# Patient Record
Sex: MALE | Race: WHITE | Employment: OTHER | URBAN - METROPOLITAN AREA
[De-identification: names, ages, dates, MRNs, and addresses within clinical notes are randomized per-mention and may not be internally consistent; named-entity substitution may affect disease eponyms.]

---

## 2020-11-10 ENCOUNTER — TELEPHONE (OUTPATIENT)
Dept: ORTHOPEDIC SURGERY | Age: 55
End: 2020-11-10

## 2022-05-27 ENCOUNTER — OFFICE VISIT (OUTPATIENT)
Dept: ENT CLINIC | Age: 57
End: 2022-05-27
Payer: COMMERCIAL

## 2022-05-27 VITALS — TEMPERATURE: 97.3 F | WEIGHT: 180 LBS | RESPIRATION RATE: 16 BRPM | BODY MASS INDEX: 25.2 KG/M2 | HEIGHT: 71 IN

## 2022-05-27 DIAGNOSIS — J39.2 OROPHARYNGEAL MASS: Primary | ICD-10-CM

## 2022-05-27 DIAGNOSIS — R25.2 TRISMUS: ICD-10-CM

## 2022-05-27 DIAGNOSIS — R59.0 CERVICAL LYMPHADENOPATHY: ICD-10-CM

## 2022-05-27 PROCEDURE — 31575 DIAGNOSTIC LARYNGOSCOPY: CPT | Performed by: OTOLARYNGOLOGY

## 2022-05-27 PROCEDURE — 99203 OFFICE O/P NEW LOW 30 MIN: CPT | Performed by: OTOLARYNGOLOGY

## 2022-05-27 PROCEDURE — 42800 BIOPSY OF THROAT: CPT | Performed by: OTOLARYNGOLOGY

## 2022-05-27 ASSESSMENT — ENCOUNTER SYMPTOMS
COUGH: 0
TROUBLE SWALLOWING: 1
VOICE CHANGE: 0
FACIAL SWELLING: 1
EYE ITCHING: 0
SINUS PRESSURE: 0
SHORTNESS OF BREATH: 0
SORE THROAT: 1
APNEA: 0

## 2022-05-27 NOTE — PROGRESS NOTES
Roxanne Sanchez 42, [address], [facility] Joseph Irwin  P: [phone]       Patient     Leno White  1965    ChiefComplaint     Chief Complaint   Patient presents with    Other     Patient stated that he was attempting to removed a tonsil stone with a dental pick, gagged and gashed the inside of his throat back in November. States that it bled a lot. States that he got an infection and got ABX. States that the pain has been off and on, he has gotten ABX a few other times. Now has pain in the jaw, ear, neck, states that he cannot open his mouth very far and he cannot eat because it is too painful to chew    Sinus Problem     States that he cannot breathe out through his nose- States that he has to breathe through his mouth which is making everything worse       History of Present Illness     Yamile Mendenhall is a 57-year-old male here today for evaluation of right-sided sore throat and trismus. Reports November 2021 attempted to get a tonsil stone out he dislodged a portion of his tonsil. Subsequently developed pain seen at urgent care diagnosed with tonsillitis and treated with antibiotics x2. Symptoms recurred in March and have persisted and worsened since. Reports trismus for the last 4 weeks. Believes he has an abscess that needs drained to improve symptoms. Current daily smoker. No history of daily alcohol use. Has lost 20 pounds in the last month secondary to difficulty eating. Past Medical History     History reviewed. No pertinent past medical history. Past Surgical History     History reviewed. No pertinent surgical history. Family History     History reviewed. No pertinent family history.     Social History     Social History     Tobacco Use    Smoking status: Current Every Day Smoker     Packs/day: 0.50     Types: Cigarettes    Smokeless tobacco: Never Used   Vaping Use    Vaping Use: Never used   Substance Use Topics    Alcohol use: Yes     Comment: 2 drinks a week    Drug use: Never        Allergies     Allergies   Allergen Reactions    Erythromycin Base      uncoated    Tetanus Toxoid      last injection was at age 13, got very high fever       Medications     No current outpatient medications on file. No current facility-administered medications for this visit. Review of Systems     Review of Systems   Constitutional: Negative for appetite change, chills, fatigue, fever and unexpected weight change. HENT: Positive for facial swelling, sore throat and trouble swallowing. Negative for congestion, ear discharge, ear pain, hearing loss, nosebleeds, postnasal drip, sinus pressure, sneezing, tinnitus and voice change. Eyes: Negative for itching. Respiratory: Negative for apnea, cough and shortness of breath. Endocrine: Negative for cold intolerance and heat intolerance. Musculoskeletal: Negative for myalgias and neck pain. Skin: Negative for rash. Allergic/Immunologic: Negative for environmental allergies. Neurological: Negative for dizziness and headaches. Hematological: Positive for adenopathy. Psychiatric/Behavioral: Negative for confusion, decreased concentration and sleep disturbance. PhysicalExam     Vitals:    05/27/22 1358   Resp: 16   Temp: 97.3 °F (36.3 °C)   TempSrc: Infrared   Weight: 180 lb (81.6 kg)   Height: 5' 11\" (1.803 m)       Physical Exam  Constitutional:       General: He is not in acute distress. Appearance: He is well-developed. HENT:      Head: Normocephalic and atraumatic. Comments: 1.5cm trismus     Right Ear: Tympanic membrane, ear canal and external ear normal. No drainage. No middle ear effusion. Tympanic membrane is not bulging. Tympanic membrane has normal mobility. Left Ear: Tympanic membrane, ear canal and external ear normal. No drainage. No middle ear effusion. Tympanic membrane is not bulging. Tympanic membrane has normal mobility. Nose: No mucosal edema or rhinorrhea. no complications. Pertinent findings:  Mass vs edematous tissue nasopharynx, right tonsillar mass- lateral pharyngeal wall does not appear to be involved    Biopsy oropharynx    Pre OP Dx: Oropharyngeal mass  Post OP Dx: Same  Consent: Written  Anesthetics: 1 cc of 1 % lido with epi  Procedure: the oropharynx was sprayed with Cetacaine and the lesion was with lidocaine with epinephrine and given adequate time to take effect. Then a cup forcep was used to take biopsy of lesion. Bleeding minimal.  Patient tolerated. Assessment and Plan     1. Oropharyngeal mass  - CT SOFT TISSUE NECK W CONTRAST; Future  - SURGICAL PATHOLOGY    2. Trismus    3. 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 Biopsy performed today in office. Plan for CT neck. Further recommendations after imaging and biopsy result. Peter Lee DO  5/27/22      Portions of this note were dictated using Dragon.  There may be linguistic errors secondary to the use of this program.

## 2022-05-30 ENCOUNTER — APPOINTMENT (OUTPATIENT)
Dept: GENERAL RADIOLOGY | Age: 57
DRG: 309 | End: 2022-05-30
Payer: COMMERCIAL

## 2022-05-30 ENCOUNTER — HOSPITAL ENCOUNTER (INPATIENT)
Age: 57
LOS: 1 days | Discharge: HOME | DRG: 309 | End: 2022-06-01
Attending: EMERGENCY MEDICINE | Admitting: INTERNAL MEDICINE
Payer: COMMERCIAL

## 2022-05-30 DIAGNOSIS — R55 SYNCOPE AND COLLAPSE: ICD-10-CM

## 2022-05-30 DIAGNOSIS — R00.1 SYMPTOMATIC BRADYCARDIA: Primary | ICD-10-CM

## 2022-05-30 LAB
A/G RATIO: 2 (ref 1.1–2.2)
ALBUMIN SERPL-MCNC: 4.2 G/DL (ref 3.4–5)
ALP BLD-CCNC: 71 U/L (ref 40–129)
ALT SERPL-CCNC: 15 U/L (ref 10–40)
ANION GAP SERPL CALCULATED.3IONS-SCNC: 16 MMOL/L (ref 3–16)
AST SERPL-CCNC: 12 U/L (ref 15–37)
BASOPHILS ABSOLUTE: 0.1 K/UL (ref 0–0.2)
BASOPHILS RELATIVE PERCENT: 1.3 %
BILIRUB SERPL-MCNC: 0.3 MG/DL (ref 0–1)
BUN BLDV-MCNC: 16 MG/DL (ref 7–20)
CALCIUM SERPL-MCNC: 8.7 MG/DL (ref 8.3–10.6)
CHLORIDE BLD-SCNC: 97 MMOL/L (ref 99–110)
CO2: 23 MMOL/L (ref 21–32)
CREAT SERPL-MCNC: 1 MG/DL (ref 0.9–1.3)
EOSINOPHILS ABSOLUTE: 0.1 K/UL (ref 0–0.6)
EOSINOPHILS RELATIVE PERCENT: 1.6 %
GFR AFRICAN AMERICAN: >60
GFR NON-AFRICAN AMERICAN: >60
GLUCOSE BLD-MCNC: 99 MG/DL (ref 70–99)
HCT VFR BLD CALC: 44.2 % (ref 40.5–52.5)
HEMOGLOBIN: 15.2 G/DL (ref 13.5–17.5)
LYMPHOCYTES ABSOLUTE: 2.4 K/UL (ref 1–5.1)
LYMPHOCYTES RELATIVE PERCENT: 31.2 %
MAGNESIUM: 2.1 MG/DL (ref 1.8–2.4)
MCH RBC QN AUTO: 30.9 PG (ref 26–34)
MCHC RBC AUTO-ENTMCNC: 34.3 G/DL (ref 31–36)
MCV RBC AUTO: 89.9 FL (ref 80–100)
MONOCYTES ABSOLUTE: 0.8 K/UL (ref 0–1.3)
MONOCYTES RELATIVE PERCENT: 10.4 %
NEUTROPHILS ABSOLUTE: 4.3 K/UL (ref 1.7–7.7)
NEUTROPHILS RELATIVE PERCENT: 55.5 %
PDW BLD-RTO: 13.3 % (ref 12.4–15.4)
PLATELET # BLD: 329 K/UL (ref 135–450)
PMV BLD AUTO: 6.7 FL (ref 5–10.5)
POTASSIUM SERPL-SCNC: 4 MMOL/L (ref 3.5–5.1)
PRO-BNP: 116 PG/ML (ref 0–124)
RBC # BLD: 4.91 M/UL (ref 4.2–5.9)
SODIUM BLD-SCNC: 136 MMOL/L (ref 136–145)
TOTAL PROTEIN: 6.3 G/DL (ref 6.4–8.2)
TROPONIN: <0.01 NG/ML
WBC # BLD: 7.8 K/UL (ref 4–11)

## 2022-05-30 PROCEDURE — 71045 X-RAY EXAM CHEST 1 VIEW: CPT

## 2022-05-30 PROCEDURE — 83880 ASSAY OF NATRIURETIC PEPTIDE: CPT

## 2022-05-30 PROCEDURE — 80179 DRUG ASSAY SALICYLATE: CPT

## 2022-05-30 PROCEDURE — 85025 COMPLETE CBC W/AUTO DIFF WBC: CPT

## 2022-05-30 PROCEDURE — 6360000002 HC RX W HCPCS: Performed by: EMERGENCY MEDICINE

## 2022-05-30 PROCEDURE — 80143 DRUG ASSAY ACETAMINOPHEN: CPT

## 2022-05-30 PROCEDURE — 96361 HYDRATE IV INFUSION ADD-ON: CPT

## 2022-05-30 PROCEDURE — 93005 ELECTROCARDIOGRAM TRACING: CPT | Performed by: EMERGENCY MEDICINE

## 2022-05-30 PROCEDURE — 80053 COMPREHEN METABOLIC PANEL: CPT

## 2022-05-30 PROCEDURE — 84484 ASSAY OF TROPONIN QUANT: CPT

## 2022-05-30 PROCEDURE — 84100 ASSAY OF PHOSPHORUS: CPT

## 2022-05-30 PROCEDURE — 2580000003 HC RX 258: Performed by: EMERGENCY MEDICINE

## 2022-05-30 PROCEDURE — 82077 ASSAY SPEC XCP UR&BREATH IA: CPT

## 2022-05-30 PROCEDURE — 96374 THER/PROPH/DIAG INJ IV PUSH: CPT

## 2022-05-30 PROCEDURE — 6360000002 HC RX W HCPCS

## 2022-05-30 PROCEDURE — 83735 ASSAY OF MAGNESIUM: CPT

## 2022-05-30 RX ORDER — LIDOCAINE HYDROCHLORIDE 20 MG/ML
15 SOLUTION OROPHARYNGEAL ONCE
Status: COMPLETED | OUTPATIENT
Start: 2022-05-30 | End: 2022-05-31

## 2022-05-30 RX ORDER — ATROPINE SULFATE 0.1 MG/ML
0.5 INJECTION INTRAVENOUS ONCE
Status: COMPLETED | OUTPATIENT
Start: 2022-05-30 | End: 2022-05-30

## 2022-05-30 RX ORDER — ATROPINE SULFATE 0.1 MG/ML
INJECTION INTRAVENOUS
Status: DISPENSED
Start: 2022-05-30 | End: 2022-05-31

## 2022-05-30 RX ORDER — 0.9 % SODIUM CHLORIDE 0.9 %
500 INTRAVENOUS SOLUTION INTRAVENOUS ONCE
Status: COMPLETED | OUTPATIENT
Start: 2022-05-30 | End: 2022-05-31

## 2022-05-30 RX ORDER — ATROPINE SULFATE 0.1 MG/ML
1 INJECTION INTRAVENOUS ONCE
Status: DISCONTINUED | OUTPATIENT
Start: 2022-05-30 | End: 2022-06-01 | Stop reason: HOSPADM

## 2022-05-30 RX ORDER — ATROPINE SULFATE 0.1 MG/ML
INJECTION INTRAVENOUS
Status: COMPLETED
Start: 2022-05-30 | End: 2022-05-30

## 2022-05-30 RX ADMIN — ATROPINE SULFATE 0.5 MG: 0.1 INJECTION, SOLUTION INTRAVENOUS at 23:27

## 2022-05-30 RX ADMIN — SODIUM CHLORIDE 500 ML: 9 INJECTION, SOLUTION INTRAVENOUS at 23:28

## 2022-05-30 RX ADMIN — ATROPINE SULFATE 0.5 MG: 0.1 INJECTION INTRAVENOUS at 23:27

## 2022-05-30 ASSESSMENT — PAIN - FUNCTIONAL ASSESSMENT: PAIN_FUNCTIONAL_ASSESSMENT: NONE - DENIES PAIN

## 2022-05-31 ENCOUNTER — APPOINTMENT (OUTPATIENT)
Dept: CT IMAGING | Age: 57
DRG: 309 | End: 2022-05-31
Payer: COMMERCIAL

## 2022-05-31 PROBLEM — R00.1 SYMPTOMATIC BRADYCARDIA: Status: ACTIVE | Noted: 2022-05-31

## 2022-05-31 PROBLEM — R22.1 MASS OF RIGHT SIDE OF NECK: Status: ACTIVE | Noted: 2022-05-31

## 2022-05-31 LAB
ACETAMINOPHEN LEVEL: <5 UG/ML (ref 10–30)
AMPHETAMINE SCREEN, URINE: NORMAL
BARBITURATE SCREEN URINE: NORMAL
BENZODIAZEPINE SCREEN, URINE: NORMAL
BILIRUBIN URINE: NEGATIVE
BLOOD, URINE: NEGATIVE
CANNABINOID SCREEN URINE: NORMAL
CHOLESTEROL, TOTAL: 182 MG/DL (ref 0–199)
CLARITY: CLEAR
COCAINE METABOLITE SCREEN URINE: NORMAL
COLOR: YELLOW
ESTIMATED AVERAGE GLUCOSE: 116.9 MG/DL
ETHANOL: 38 MG/DL (ref 0–0.08)
GLUCOSE URINE: NEGATIVE MG/DL
HBA1C MFR BLD: 5.7 %
HDLC SERPL-MCNC: 30 MG/DL (ref 40–60)
KETONES, URINE: ABNORMAL MG/DL
LACTIC ACID: 1 MMOL/L (ref 0.4–2)
LDL CHOLESTEROL CALCULATED: 129 MG/DL
LEUKOCYTE ESTERASE, URINE: NEGATIVE
LV EF: 55 %
LVEF MODALITY: NORMAL
Lab: NORMAL
METHADONE SCREEN, URINE: NORMAL
MICROSCOPIC EXAMINATION: ABNORMAL
NITRITE, URINE: NEGATIVE
OPIATE SCREEN URINE: NORMAL
OXYCODONE URINE: NORMAL
PH UA: 6
PH UA: 6 (ref 5–8)
PHENCYCLIDINE SCREEN URINE: NORMAL
PHOSPHORUS: 4.3 MG/DL (ref 2.5–4.9)
PROPOXYPHENE SCREEN: NORMAL
PROTEIN UA: NEGATIVE MG/DL
SALICYLATE, SERUM: <0.3 MG/DL (ref 15–30)
SPECIFIC GRAVITY UA: 1.02 (ref 1–1.03)
TOTAL CK: 55 U/L (ref 39–308)
TRIGL SERPL-MCNC: 113 MG/DL (ref 0–150)
TROPONIN: <0.01 NG/ML
TROPONIN: <0.01 NG/ML
TSH REFLEX: 0.57 UIU/ML (ref 0.27–4.2)
URINE REFLEX TO CULTURE: ABNORMAL
URINE TYPE: ABNORMAL
UROBILINOGEN, URINE: 0.2 E.U./DL
VLDLC SERPL CALC-MCNC: 23 MG/DL

## 2022-05-31 PROCEDURE — 82550 ASSAY OF CK (CPK): CPT

## 2022-05-31 PROCEDURE — 6370000000 HC RX 637 (ALT 250 FOR IP): Performed by: INTERNAL MEDICINE

## 2022-05-31 PROCEDURE — 81003 URINALYSIS AUTO W/O SCOPE: CPT

## 2022-05-31 PROCEDURE — 83036 HEMOGLOBIN GLYCOSYLATED A1C: CPT

## 2022-05-31 PROCEDURE — 2000000000 HC ICU R&B

## 2022-05-31 PROCEDURE — 6360000002 HC RX W HCPCS: Performed by: INTERNAL MEDICINE

## 2022-05-31 PROCEDURE — 80061 LIPID PANEL: CPT

## 2022-05-31 PROCEDURE — 6360000004 HC RX CONTRAST MEDICATION: Performed by: OTOLARYNGOLOGY

## 2022-05-31 PROCEDURE — 83605 ASSAY OF LACTIC ACID: CPT

## 2022-05-31 PROCEDURE — 2580000003 HC RX 258: Performed by: INTERNAL MEDICINE

## 2022-05-31 PROCEDURE — 99285 EMERGENCY DEPT VISIT HI MDM: CPT

## 2022-05-31 PROCEDURE — 99223 1ST HOSP IP/OBS HIGH 75: CPT | Performed by: INTERNAL MEDICINE

## 2022-05-31 PROCEDURE — 36415 COLL VENOUS BLD VENIPUNCTURE: CPT

## 2022-05-31 PROCEDURE — 80307 DRUG TEST PRSMV CHEM ANLYZR: CPT

## 2022-05-31 PROCEDURE — 93306 TTE W/DOPPLER COMPLETE: CPT

## 2022-05-31 PROCEDURE — 84484 ASSAY OF TROPONIN QUANT: CPT

## 2022-05-31 PROCEDURE — 84443 ASSAY THYROID STIM HORMONE: CPT

## 2022-05-31 PROCEDURE — 6370000000 HC RX 637 (ALT 250 FOR IP): Performed by: EMERGENCY MEDICINE

## 2022-05-31 PROCEDURE — 70491 CT SOFT TISSUE NECK W/DYE: CPT

## 2022-05-31 RX ORDER — ACETAMINOPHEN 325 MG/1
650 TABLET ORAL EVERY 6 HOURS PRN
Status: DISCONTINUED | OUTPATIENT
Start: 2022-05-31 | End: 2022-06-01 | Stop reason: HOSPADM

## 2022-05-31 RX ORDER — NICOTINE 21 MG/24HR
1 PATCH, TRANSDERMAL 24 HOURS TRANSDERMAL DAILY
Status: DISCONTINUED | OUTPATIENT
Start: 2022-05-31 | End: 2022-06-01 | Stop reason: HOSPADM

## 2022-05-31 RX ORDER — HYDRALAZINE HYDROCHLORIDE 20 MG/ML
10 INJECTION INTRAMUSCULAR; INTRAVENOUS EVERY 4 HOURS PRN
Status: DISCONTINUED | OUTPATIENT
Start: 2022-05-31 | End: 2022-06-01 | Stop reason: HOSPADM

## 2022-05-31 RX ORDER — PROMETHAZINE HYDROCHLORIDE 25 MG/1
12.5 TABLET ORAL EVERY 6 HOURS PRN
Status: DISCONTINUED | OUTPATIENT
Start: 2022-05-31 | End: 2022-06-01 | Stop reason: HOSPADM

## 2022-05-31 RX ORDER — ONDANSETRON 2 MG/ML
4 INJECTION INTRAMUSCULAR; INTRAVENOUS EVERY 6 HOURS PRN
Status: DISCONTINUED | OUTPATIENT
Start: 2022-05-31 | End: 2022-06-01 | Stop reason: HOSPADM

## 2022-05-31 RX ORDER — SODIUM CHLORIDE 0.9 % (FLUSH) 0.9 %
10 SYRINGE (ML) INJECTION EVERY 12 HOURS SCHEDULED
Status: DISCONTINUED | OUTPATIENT
Start: 2022-05-31 | End: 2022-06-01 | Stop reason: HOSPADM

## 2022-05-31 RX ORDER — MAGNESIUM SULFATE IN WATER 40 MG/ML
2000 INJECTION, SOLUTION INTRAVENOUS PRN
Status: DISCONTINUED | OUTPATIENT
Start: 2022-05-31 | End: 2022-06-01 | Stop reason: HOSPADM

## 2022-05-31 RX ORDER — ENOXAPARIN SODIUM 100 MG/ML
40 INJECTION SUBCUTANEOUS DAILY
Status: DISCONTINUED | OUTPATIENT
Start: 2022-05-31 | End: 2022-06-01 | Stop reason: HOSPADM

## 2022-05-31 RX ORDER — SODIUM CHLORIDE 0.9 % (FLUSH) 0.9 %
10 SYRINGE (ML) INJECTION PRN
Status: DISCONTINUED | OUTPATIENT
Start: 2022-05-31 | End: 2022-06-01 | Stop reason: HOSPADM

## 2022-05-31 RX ORDER — POTASSIUM CHLORIDE 7.45 MG/ML
10 INJECTION INTRAVENOUS PRN
Status: DISCONTINUED | OUTPATIENT
Start: 2022-05-31 | End: 2022-06-01 | Stop reason: HOSPADM

## 2022-05-31 RX ORDER — SODIUM CHLORIDE 9 MG/ML
INJECTION, SOLUTION INTRAVENOUS PRN
Status: DISCONTINUED | OUTPATIENT
Start: 2022-05-31 | End: 2022-06-01 | Stop reason: HOSPADM

## 2022-05-31 RX ORDER — ACETAMINOPHEN 650 MG/1
650 SUPPOSITORY RECTAL EVERY 6 HOURS PRN
Status: DISCONTINUED | OUTPATIENT
Start: 2022-05-31 | End: 2022-06-01 | Stop reason: HOSPADM

## 2022-05-31 RX ADMIN — ENOXAPARIN SODIUM 40 MG: 100 INJECTION SUBCUTANEOUS at 12:09

## 2022-05-31 RX ADMIN — Medication 10 ML: at 12:10

## 2022-05-31 RX ADMIN — Medication 10 ML: at 20:41

## 2022-05-31 RX ADMIN — LIDOCAINE HYDROCHLORIDE 15 ML: 20 SOLUTION ORAL; TOPICAL at 00:00

## 2022-05-31 RX ADMIN — HYDRALAZINE HYDROCHLORIDE 10 MG: 20 INJECTION INTRAMUSCULAR; INTRAVENOUS at 12:07

## 2022-05-31 RX ADMIN — IOPAMIDOL 75 ML: 755 INJECTION, SOLUTION INTRAVENOUS at 14:05

## 2022-05-31 RX ADMIN — MUPIROCIN: 20 OINTMENT TOPICAL at 20:41

## 2022-05-31 RX ADMIN — MUPIROCIN: 20 OINTMENT TOPICAL at 12:07

## 2022-05-31 ASSESSMENT — ENCOUNTER SYMPTOMS
VOMITING: 1
BACK PAIN: 0
TROUBLE SWALLOWING: 0
ABDOMINAL PAIN: 0
FACIAL SWELLING: 1
SHORTNESS OF BREATH: 0
EYE PAIN: 0
CHEST TIGHTNESS: 0
NAUSEA: 1

## 2022-05-31 NOTE — CARE COORDINATION
CASE MANAGEMENT INITIAL ASSESSMENT      Reviewed chart and completed assessment with patient:  Family present: wife, sister and sister in law  Explained Case Management role/services. Primary contact information:wife, Helga Moore [phone]    Health Care Decision Maker :   Helga Moore [phone]        Can this person be reached and be able to respond quickly, such as within a few minutes or hours? Yes      Admit date/status:05/31/22  Diagnosis:Syncope and collapse   Is this a Readmission?:  No      Insurance: [facility] required for SNF: Yes       3 night stay required: No    Living arrangements, Adls, care needs, prior to admission:two story house with spouse and two sons. [employer]. Works full time. Durable Medical Equipment at home:  None    Services in the home and/or outpatient, prior to admission:none    Current Aaron Herrera, DO                                Medications:yes Prescription coverage? Yes Will pt require financial assistance with medications No     Transportation needs: drives     Dialysis Facility (if applicable)   · Name:  · Address:  · Dialysis Schedule:  · Phone:  · Fax:    PT/OT recs:n/a    Hospital Exemption Notification (HEN):n/a    Barriers to discharge:none    Plan/comments:denies needs. Will sign off. Please consult if needs arise.

## 2022-05-31 NOTE — PLAN OF CARE
Problem: Discharge Planning  Goal: Discharge to home or other facility with appropriate resources  Outcome: Progressing  Flowsheets (Taken 5/31/2022 0138)  Discharge to home or other facility with appropriate resources: Identify barriers to discharge with patient and caregiver     Problem: Safety - Adult  Goal: Free from fall injury  Outcome: Progressing

## 2022-05-31 NOTE — PROGRESS NOTES
Hospitalist Progress Note Addendum    The patient was admitted after midnight for   Principal Problem:    Symptomatic bradycardia  Resolved Problems:    * No resolved hospital problems. *  .    Brief update:  61yo WM with no sig PMHX presents from home with loss of consciousness. This has happened several times in the past month. He has c/o R jaw pain that has progressed to R numbness and tingling. He just had a biopsy of mass in R jaw. Apparently in 11/21 he had a tonsil stone and was trying to remove it himself with a dental pick. HE started to bleed and developed a lot of pain. He was dx twice with tonsillitis since then and was give abx x 2. He has lost 20 pounds in past month due to difficulty eating. The episodes of syncope often occur after eating and he feels dizzy and weird. Wife noted that he as shaking his R hand during the episode. He was profoundly hypotensive and bradycardic and they noted a second degree AV bloack. Atropine was given and he improved. Plan is for CT of neck to see if mass is pushing on vagus nerve. Pt has been having off and on odd rhythms.     BP (!) 142/83   Pulse 64   Temp 98.4 °F (36.9 °C) (Oral)   Resp 14   Ht 6' (1.829 m)   Wt 183 lb 6.8 oz (83.2 kg)   SpO2 96%   BMI 24.88 kg/m²       CBC with Differential:    Lab Results   Component Value Date    WBC 7.8 05/30/2022    RBC 4.91 05/30/2022    HGB 15.2 05/30/2022    HCT 44.2 05/30/2022     05/30/2022    MCV 89.9 05/30/2022    MCH 30.9 05/30/2022    MCHC 34.3 05/30/2022    RDW 13.3 05/30/2022    LYMPHOPCT 31.2 05/30/2022    MONOPCT 10.4 05/30/2022    BASOPCT 1.3 05/30/2022    MONOSABS 0.8 05/30/2022    LYMPHSABS 2.4 05/30/2022    EOSABS 0.1 05/30/2022    BASOSABS 0.1 05/30/2022     BMP:    Lab Results   Component Value Date     05/30/2022    K 4.0 05/30/2022    CL 97 05/30/2022    CO2 23 05/30/2022    BUN 16 05/30/2022    LABALBU 4.2 05/30/2022    CREATININE 1.0 05/30/2022 CALCIUM 8.7 05/30/2022    GFRAA >60 05/30/2022    LABGLOM >60 05/30/2022    GLUCOSE 99 05/30/2022       The patient has been seen and examined. Difficulty with talking due to pain in R jaw/mouth. Possibly mass is impacting vagus nerve. I have the following recommendations:    1. Symptomatic bradycardia = awaiting neck CT, then will feed pt - will need to see if mass needs to be removed if it is impinging on nerve and causing arrhythmias  2.  R neck mass - await path results  3. This patient meets with criteria for  moderate  malnutrution based on a BMI of  Body mass index is 24.88 kg/m². .  This malnutrition is likely due to mass in R neck .               Electronically signed by Karly Chang MD on 5/31/2022 at 8:56 AM

## 2022-05-31 NOTE — CONSULTS
[address] [address]                                  CONSULTATION    PATIENT NAME: Vilma Chung                    :        1965  MED REC NO:   [id number]                          ROOM:       6661  ACCOUNT NO:   [de-identified]                           ADMIT DATE: 2022  PROVIDER:     Janette Kaufman MD    CONSULT DATE:  2022    CARDIAC ELECTROPHYSIOLOGY CONSULTATION    REASON FOR CONSULTATION:  Syncope. HISTORY OF PRESENT ILLNESS:  The patient is a 47-year-old man with  recent history of right pharyngeal mass who presents after an episode of  syncope on 2022. The patient reports that his wife reports_ he had  eaten dinner and was sitting down on the bed and felt dizzy. He then  stood up and had loss of postural tone and fell backwards. Once supine  he recovered consciousness within a few seconds. He had a second  episode of dizziness, which did not result in loss of consciousness. The patient did not attempt to stand up with the second episode. The  patient did complain of right jaw pain with some paresthesias in the  right side of the mandible at that time. EMS was summoned to the scene. They reported some bradycardia. The patient is brought to the emergency  department. Unfortunately, no vital signs from the emergency department  are available. A verbal report indicates that the patient was  hypotensive and lethargic. He was administered atropine and had some  improvement. Reports from EMS demonstrates second-degree heart block,  which was also commented upon in the emergency department, though no  ECGs are available to demonstrate this. ECGs in the emergency  department do demonstrate some episodes of sinus bradycardia. On ECG  telemetry monitoring, the patient has had some episodes of second-degree  type 2 sinoatrial block with sinus rates in the 40s.   These have not  been symptomatic. The patient has no previous history of cardiovascular disease or cardiac  arrhythmias. He does have history of right pharyngeal mass, which may  represent infection. On examination a biopsy was performed by ENT on  05/27/2022. At this point, the patient continues have some trismus with  inability to open his mouth very wide. He does have some intermittent  throat and jaw pain on the right. PAST MEDICAL HISTORY:  Right pharyngeal trauma. MEDICATIONS:  At the time of admission include none. ALLERGIES:  Include ERYTHROMYCIN and TETANUS TOXOID. SOCIAL HISTORY:  The patient is a current everyday cigarette smoker. He  does consume about two alcoholic beverages per week. FAMILY HISTORY:  Unremarkable for cardiac rhythm disturbance, syncope or  sudden cardiac death. REVIEW OF SYSTEMS:  Demonstrates no recent change in appetite. He has  had a 10 to 20-pounds weight loss in the last 7 months, likely related  to his discomfort with swallowing. He has not had chest pain. He has  not had palpitations. He has not had near-syncope or syncope except  that, which precipitated admission. His functional status is otherwise  well preserved. All other components of 14-system review are negative. PHYSICAL EXAMINATION:  VITAL SIGNS:  Blood pressure is 150/90, heart rate is 72 beats per  minute and regular with some episodes of SA block. HEENT:  Exam demonstrates normocephalic and atraumatic head. There is  no scleral icterus. Pupils are round and reactive. There is no  substantial tenderness in the right throat. LUNGS:  Clear to auscultation. CARDIOVASCULAR:  Exam reveals a regular rhythm. The apical impulse is  discrete. S1 and S2 are normal.  There is no audible murmur or gallop. The jugular venous pressure does not appear elevated. ABDOMEN:  Lean, soft and nontender. EXTREMITIES:  Demonstrate no pitting, pretibial or dependent edema. There is no cyanosis.   There is no evidence for chronic venous stasis. SKIN:  Otherwise warm and dry without skin rash. DIAGNOSTIC DATA:  A 12-lead ECG from 05/30/2022 demonstrates sinus  bradycardia with heart rate of 50 beats per minute. IMPRESSIONS:  1. Syncope. 2.  Sinoatrial exit block. 3.  Right pharyngeal mass. The patient is admitted after an episode of syncope on 05/30/2022. The  patient had symptoms of lightheadedness and after standing lost postural  tone and probably had brief loss of consciousness. He subsequently had  another episode of dizziness, which did not result in syncope, likely  because he did not attempt to stand up. EMS in the emergency department  described episodic bradycardia with AV block, though this cannot be  confirmed by ECG. He does have some episodes of sinoatrial block on ECG  telemetry monitoring, but these have not resulted in substantial  bradycardia or symptoms. I am concerned about the right pharyngeal mass  and the right jaw discomfort was associated with the episodes. He may  have had significant pain to cause a hypotension-bradycardia syndrome  related to heightened vagal tone. He also may have some carotid sinus  hypersensitivity related to a pressure on the right carotid from this  mass. The records at the time of admission demonstrate,  concurrent bradycardia and hypotension, consistent with an autonomically  mediated hypotension - bradycardia syndrome. That notwithstanding, he  does have evidence for sinoatrial block by ECG telemetry monitoring,  which is unusual in this age range. It is not clear that this is new or  that this is related to his symptom. RECOMMENDATIONS:  1. Continue ECG telemetry monitoring. 2.  Would avoid isoproterenol or atropine. 3.  CT scan of the head and neck to evaluate the right pharyngeal mass. 4.  Would pursue permanent pacing if clearly symptomatic bradycardia is  identified. Thanks for the opportunity to assist in the care of the patient. Please  contact me, if you have any questions regarding his evaluation.         Ruddy Oconnell MD    D: 05/31/2022 10:58:33       T: 05/31/2022 11:01:08     [email or web address]  Job#: [id number]     Doc#: [id number]    CC:

## 2022-05-31 NOTE — PROGRESS NOTES
Dr. Latonya Gibbs at bedside. Reviewed CT scan results with patient. Will await biopsy results and then consult [facility]. Page to Dr. Boyd Maddox to Connor Spangler 4 order and further plan for patient.

## 2022-05-31 NOTE — H&P
Hospital Medicine History & Physical      PCP: Dino Baer DO    Date of Admission: 5/30/2022    Date of Service: Pt seen/examined on 05/31/2022    Pt seen/examined face to face on and admitted as inpatient with expected LOS greater than two midnights due to medical therapy    Chief Complaint:    Chief Complaint   Patient presents with    Loss of Consciousness     symptomatic bradycardia        History Of Present Illness:      60 y.o. male who presented to [facility] independent presented to the ED for acute encephalopathy. Patient reported that he does not exactly remember the episode however wife was at bedside reported that the patient ate dinner and then was in the room was sitting down in bed and then patient felt dizzy and was saying he felt weird stood up and then suddenly fell backwards was encephalopathic and came right back within seconds. Patient then was laid on the bed and had another episode where he would suddenly lose conscious and then regained conscious. Patient during her did complain of right jaw pain that continued to progress to right mouth numbness and tingling. Patient otherwise denied having any chest pain shortness of breath abdominal pain dysuria but does admit of having palpitations. There was no incontinence, foaming, tongue biting, that was noted. Wife did report that the patient was shaking on his right hand. EMS was called immediately and patient was obtained. In the ED patient was noted to be in second-degree AV block that was not caught on EKG. Patient responded with atropine and cardiology was consulted with recommendation to the ICU      Past Medical History:    Cigarette smoking  History reviewed. No pertinent past medical history. Past Surgical History:    Tracheal scope  History reviewed. No pertinent surgical history.     Medications Prior to Admission:      Prior to Admission medications    Not on File       Allergies:  Erythromycin base and Tetanus toxoid    Social History:          TOBACCO:   reports that he has been smoking cigarettes. He has been smoking about 0.50 packs per day. He has never used smokeless tobacco.  ETOH:   reports current alcohol use. E-Cigarettes/Vaping Use     Questions Responses    E-Cigarette/Vaping Use Never User    Start Date     Passive Exposure     Quit Date     Counseling Given     Comments             Family History:      Reviewed in detail, and negative paternally for sudden death      REVIEW OF SYSTEMS:     Constitutional:  No Fever, No Chills, No Night Sweats  ENT/Mouth:  No Nasal Congestion,  No Hoarseness, No new mouth lesion  Eyes:  No Eye Pain, No Redness, No Discharge  Cardiovascular:  No Chest Pain, No Orthopnea + palpitations  Respiratory:  No Cough, No Sputum, No Dyspnea  Gastrointestinal: No Vomiting, No Diarrhea, No abdominal pain  Genitourinary: No Urinary Frequency, No Hematuria, No Urinary pain  Musculoskeletal:  No worsening Arthralgias, No worsening Myalgias  Skin:  No new Skin Lesions, No new skin rash  Neuro:  No new weakness, No new numbness. Psych:  No suicial ideation, No Violence ideation    PHYSICAL EXAM PERFORMED:    /83   Pulse 64   Temp 97.8 °F (36.6 °C) (Oral)   Resp 15   Ht 6' (1.829 m)   Wt 180 lb (81.6 kg)   SpO2 95%   BMI 24.41 kg/m²     General appearance:  mild acute distress, appears older than stated age  HEENT:   atraumatic, sclera anicteric, Conjunctivae clear. Neck: Supple,Trachea midline, no goiter  Respiratory:minimal accessory muscle usage, Normal respiratory effort. Clear to auscultation, bilaterally without wheezing  Cardiovascular:  Regular rate and rhythm, capillary refill 2 seconds  Abdomen: Soft, non-tender, non-distended with normal bowel sounds. Musculoskeletal:  No clubbing, cyanosis. trace edema LE bilaterally. Skin: turgor normal.  No new rashes or lesions. Neurologic: Alert and oriented x4, no new focal sensory/motor deficits.      Labs: Recent Labs     05/30/22  2324   WBC 7.8   HGB 15.2   HCT 44.2        Recent Labs     05/30/22  2324      K 4.0   CL 97*   CO2 23   BUN 16   CREATININE 1.0   CALCIUM 8.7     Recent Labs     05/30/22  2324   AST 12*   ALT 15   BILITOT 0.3   ALKPHOS 71     No results for input(s): INR in the last 72 hours. Recent Labs     05/30/22  2324   TROPONINI <0.01       Urinalysis:    No results found for: Fabiola [place], BACTERIA, RBCUA, BLOODU, [address], GLUCOSEU    Radiology:     CXR: I have reviewed the CXR with the following interpretation:   No acute process  EKG:  I have reviewed the EKG with the following interpretation:   Sinus bradycardia QTc 386  XR CHEST PORTABLE   Final Result   No acute abnormality. ASSESSMENT AND PLAN:    Active Hospital Problems    Diagnosis Date Noted    Symptomatic bradycardia [R00.1] 05/31/2022     Priority: Medium     Symptomatic bradycardia:  Suspected secondary AV block noted on the monitor when patient was in stable. Responsive to atropine  Cardiology consulted, much appreciated  Recommendation of placing patient on isoproterenol  Telemetry with ICU admission    Cigarette smoking:  Nicotine patch ordered    oropharyngeal mass status post  Biopsy:  CT neck ordered as outpatient.   Patient deferred work-up in the inpatient  Outpatient follow-up with ENT    Metabolic acidosis: Anion gap 16  Ethanol, salicylate, lactic acid pending      Diet: NPO except meds ordered    DVT Prophylaxis: lovenox    Dispo:   Expected LOS greater than two [facility]

## 2022-05-31 NOTE — ED PROVIDER NOTES
[address] ENCOUNTER        Pt Name: La Nena Sanders  MRN: [id number]  [place] 1965  Date of evaluation: 5/30/2022  Provider: Nallely Anderson MD  PCP: River Edwards DO      CHIEF COMPLAINT       Chief Complaint   Patient presents with    Loss of Consciousness     symptomatic bradycardia       HISTORY OFPRESENT ILLNESS   (Location/Symptom, Timing/Onset, Context/Setting, Quality, Duration, Modifying Factors,Severity)  Note limiting factors. La Nena Sanders is a 60 y.o. male presenting today due to concern for developing severe lightheadedness with a syncopal event around 9:30 PM this evening at which point EMS arrived and stated his heart rate was in the low 30s. Before they gave him any atropine, his heart rate avis up appropriately although he did have a vomiting episode following this and therefore he received Zofran. He does report right-sided facial pain and is currently being worked up by ENT for possible mass. He denied ever having any chest pain or shortness of breath tonight. He did become very diaphoretic this evening. He denies any fever. He denies any headache or neck pain. Due to concern for syncopal event and then having an second episode while in the squad where he almost passed out again, he was brought to the ED for further evaluation. He has never felt this way before or dealt with syncope. He does have a history of smoking. No reported trauma from the syncope this evening. REVIEW OF SYSTEMS    (2-9 systems for level 4, 10 or more for level 5)     Review of Systems   Constitutional: Positive for activity change and diaphoresis. Negative for fever. HENT: Positive for ear pain (right) and facial swelling (right jaw). Negative for trouble swallowing. Eyes: Positive for visual disturbance (darkened vision briefly). Negative for pain. Respiratory: Negative for chest tightness and shortness of breath.     Cardiovascular: Negative Session: Not on file   Stress:     Feeling of Stress : Not on file   Social Connections:     Frequency of Communication with Friends and Family: Not on file    Frequency of Social Gatherings with Friends and Family: Not on file    Attends [employer] Services: Not on file    Active Member of [address] [place] [address] or Organizations: Not on file    Attends Club or Organization Meetings: Not on file    Marital Status: Not on file   Intimate Partner Violence:     Fear of Current or Ex-Partner: Not on file    Emotionally Abused: Not on file    Physically Abused: Not on file    Sexually Abused: Not on file   Housing Stability:     Unable to Pay for Housing in the Last Year: Not on file    Number of [place] in the Last Year: Not on file    Unstable Housing in the Last Year: Not on file       SCREENINGS    Lorena Coma Scale  Eye Opening: Spontaneous  Best Verbal Response: Oriented  Best Motor Response: Obeys commands  Lorena Coma Scale Score: 15           PHYSICAL EXAM    (up to 7 for level 4, 8 or more for level 5)     ED Triage Vitals   BP Temp Temp src Pulse Resp SpO2 Height Weight   -- -- -- -- -- -- -- --       Physical Exam  Vitals and nursing note reviewed. Constitutional:       General: He is awake. He is in acute distress (moderate). Appearance: He is well-developed, well-groomed and normal weight. He is ill-appearing, toxic-appearing and diaphoretic. Interventions: He is not intubated. HENT:      Head: Normocephalic and atraumatic. No Dominique's sign, contusion, masses, right periorbital erythema, left periorbital erythema or laceration. Hair is normal.      Jaw: There is normal jaw occlusion. Trismus (right side), tenderness (right side) and pain on movement present. No malocclusion. Right Ear: External ear normal. No mastoid tenderness. Left Ear: External ear normal. No mastoid tenderness. Nose:      Right Sinus: Maxillary sinus tenderness present. No frontal sinus tenderness. Left Sinus: No maxillary sinus tenderness or frontal sinus tenderness. Mouth/Throat:      Lips: Pink. No lesions. Mouth: Mucous membranes are dry. No injury. Eyes:      General: No scleral icterus. Right eye: No discharge. Left eye: No discharge. Pupils: Pupils are equal, round, and reactive to light. Neck:      Vascular: No JVD. Trachea: Trachea and phonation normal. No tracheal deviation. Cardiovascular:      Rate and Rhythm: Bradycardia present. Rhythm irregular. Pulses:           Radial pulses are 1+ on the right side and 1+ on the left side. Pulmonary:      Effort: Pulmonary effort is normal. No tachypnea, bradypnea, accessory muscle usage, prolonged expiration, respiratory distress or retractions. He is not intubated. Breath sounds: Normal breath sounds and air entry. No stridor. No decreased breath sounds, wheezing, rhonchi or rales. Chest:      Chest wall: No tenderness. Abdominal:      General: Abdomen is flat. Bowel sounds are normal. There is no distension. Palpations: Abdomen is soft. Tenderness: There is no abdominal tenderness. There is no guarding or rebound. Musculoskeletal:         General: No tenderness, deformity or signs of injury. Normal range of motion. Cervical back: Full passive range of motion without pain, normal range of motion and neck supple. No edema, erythema, signs of trauma, rigidity, torticollis, tenderness, bony tenderness or crepitus. No pain with movement, spinous process tenderness or muscular tenderness. Normal range of motion. Thoracic back: No tenderness or bony tenderness. Lumbar back: No tenderness or bony tenderness. Comments: MSK: Normal range of motion of bilateral shoulders, elbows, wrists, hips, knees, ankles and nontender to palpation of all joints      Skin:     General: Skin is warm. Coloration: Skin is not jaundiced or pale. Findings: No erythema or rash. Neurological:      General: No focal deficit present. Mental Status: He is alert and oriented to person, place, and time. Mental status is at baseline. GCS: GCS eye subscore is 4. GCS verbal subscore is 5. GCS motor subscore is 6. Cranial Nerves: No dysarthria or facial asymmetry. Motor: Motor function is intact. No weakness, tremor, atrophy, abnormal muscle tone or seizure activity. Psychiatric:         Attention and Perception: Attention normal. He is attentive. Mood and Affect: Affect normal. Mood is anxious and depressed. Speech: Speech normal. Speech is not slurred. Behavior: Behavior is slowed. Behavior is cooperative. DIAGNOSTIC RESULTS   :    Labs Reviewed   COMPREHENSIVE METABOLIC PANEL - Abnormal; Notable for the following components:       Result Value    Chloride 97 (*)     Total Protein 6.3 (*)     AST 12 (*)     All other components within normal limits   SALICYLATE LEVEL - Abnormal; Notable for the following components:    Salicylate, Serum <1.3 (*)     All other components within normal limits   ACETAMINOPHEN LEVEL - Abnormal; Notable for the following components:    Acetaminophen Level <5 (*)     All other components within normal limits   LIPID PANEL - Abnormal; Notable for the following components:    HDL 30 (*)     LDL Calculated 129 (*)     All other components within normal limits   URINALYSIS WITH REFLEX TO CULTURE - Abnormal; Notable for the following components:    Ketones, Urine TRACE (*)     All other components within normal limits   CBC WITH AUTO DIFFERENTIAL   MAGNESIUM   TROPONIN   BRAIN NATRIURETIC PEPTIDE   URINE DRUG SCREEN   TSH WITH REFLEX   PHOSPHORUS   ETHANOL   TROPONIN   TROPONIN   CK   HEMOGLOBIN A1C   LACTIC ACID   BLOOD GAS, VENOUS   CBC WITH AUTO DIFFERENTIAL   COMPREHENSIVE METABOLIC PANEL W/ REFLEX TO MG FOR LOW K       All other labs were within normal range or not returned asof this dictation. EKG:  All EKG's are interpreted by the Emergency Department Physician who either signs or Co-signs this chart in the absence of a cardiologist.    The Ekg interpreted by me shows  normal sinus rhythm and sinus arrhythmia with a rate of 66  Axis is   Normal  QTc is  within an acceptable range  Intervals and Durations are unremarkable. ST Segments: nonspecific changes  No significant change from prior EKG dated - no old EKG  No STEMI  Repeat EKG concerning for possible second degree type 2 heart block (vs. Artifact with sinus bradycardia), no STEMI           RADIOLOGY:   Non-plain film images such as CT, Ultrasound and MRI are read by the radiologist. 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 XR CHEST PORTABLE   Final Result   No acute abnormality. PROCEDURES   Unless otherwise noted below, none     Procedures    CRITICAL CARE TIME   Time: 45 minutes  Includes repeat examinations, speaking with consultants, lab interpretation, charting, treating for symptomatic bradycardia requiring IV atropine   Excludes separate billable procedures. Patient at risk for serious decompensation if not treated for this life-threatening condition. CONSULTS: Spoke with Dr. Benita Vance initially at 21 [phone] and then again shortly after due to concern for symptomatic bradycardia and potentially needing a transvenous pacemaker. He reported this time to see what the labs show but if he has persistent bradycardia, he may need to be started on isoproterenol and therefore I did order this in case it is needed. He did not recommend coronary catheterization at this time.   Spoke with Dr. Parth Quiros at [phone] for admission to the ICU.     IP CONSULT TO CARDIOLOGY  IP CONSULT TO HOSPITALIST  IP CONSULT TO CARDIOLOGY  IP CONSULT TO OTOLARYNGOLOGY  IP CONSULT TO ONCOLOGY    EMERGENCY DEPARTMENT COURSE and DIFFERENTIAL DIAGNOSIS/MDM:   Vitals:    Vitals:    05/31/22 1300 05/31/22 1400 05/31/22 1500 05/31/22 1700   BP: (!) 153/99 (!) 153/99 (!) 157/92 (!) 160/102   Pulse: 78 (!) 101 90 91   Resp:       Temp:       TempSrc:       SpO2: 96%  99%    Weight:       Height:           Patient was given the following medications:  Medications   atropine injection 1 mg (0 mg IntraVENous Held 5/31/22 0715)   isoproterenol (ISUPREL) 1 mg in dextrose 5 % 250 mL infusion (0 mcg/min IntraVENous Held 5/31/22 0715)   sodium chloride flush 0.9 % injection 10 mL (10 mLs IntraVENous Given 5/31/22 2041)   sodium chloride flush 0.9 % injection 10 mL (has no administration in time range)   0.9 % sodium chloride infusion (has no administration in time range)   potassium chloride 10 mEq/100 mL IVPB (Peripheral Line) (has no administration in time range)   magnesium sulfate 2000 mg in 50 mL IVPB premix (has no administration in time range)   enoxaparin (LOVENOX) injection 40 mg (40 mg SubCUTAneous Given 5/31/22 1209)   promethazine (PHENERGAN) tablet 12.5 mg (has no administration in time range)     Or   ondansetron (ZOFRAN) injection 4 mg (has no administration in time range)   acetaminophen (TYLENOL) tablet 650 mg (has no administration in time range)     Or   acetaminophen (TYLENOL) suppository 650 mg (has no administration in time range)   nicotine (NICODERM CQ) 21 MG/24HR 1 patch (0 patches TransDERmal Held 5/31/22 0714)   perflutren lipid microspheres (DEFINITY) injection 1.65 mg (has no administration in time range)   mupirocin (BACTROBAN) 2 % ointment ( Nasal Given 5/31/22 2041)   hydrALAZINE (APRESOLINE) injection 10 mg (10 mg IntraVENous Given 5/31/22 1207)   0.9 % sodium chloride bolus (0 mLs IntraVENous Stopped 5/31/22 0051)   atropine injection 0.5 mg (0.5 mg IntraVENous Given 5/30/22 2327)   atropine injection 0.5 mg (0.5 mg IntraVENous Given 5/30/22 2327)   lidocaine viscous hcl (XYLOCAINE) 2 % solution 15 mL (15 mLs Mouth/Throat Given 5/31/22 0000)   iopamidol (ISOVUE-370) 76 % injection 75 mL (75 mLs IntraVENous Given 5/31/22 1405)     Patient was evaluated due to becoming very lightheaded and ultimately having a syncopal event prior to arrival and having a second episode while in the squad. When I initially evaluated him his initial heart rate was appropriate but then a few minutes later he did drop into the low 30s and his blood pressure was 60/40 at this time and he was very diaphoretic and lethargic although was still responding to commands. We gave atropine emergently 1 mg and his heart rate did respond appropriately. He also received IV fluids following this. He then had a second episode in the ED where he started the become bradycardic again into the low 40s but without any intervention ultimately avis back into the 80s. Ultimately, he was admitted to the ICU due to concern for unstable heart rate for close observation and I did order isoproterenol at the request of cardiology in case symptoms return again with severe bradycardia. No need for transcutaneous pacing at this point. I did reevaluate him just prior to admission and transfer to the ICU and he was in no acute distress and agreed with this plan. The patient tolerated their visit well. The patient and / or the family were informed of the results of any tests, a time was given to answer questions. FINAL IMPRESSION      1. Symptomatic bradycardia    2. Syncope and collapse          DISPOSITION/PLAN   DISPOSITION Admitted 05/31/2022 12:07:22 AM      PATIENT REFERRED TO:  No follow-up provider specified. DISCHARGEMEDICATIONS:  There are no discharge medications for this patient.       DISCONTINUED MEDICATIONS:  There are no discharge medications for this patient.              (Please note that portions of this note were completed with a voicerecognition program.  Efforts were made to edit the dictations but occasionally words are mis-transcribed.)    Pao Rendon MD (electronically signed)            Pao Rendon MD  05/31/22 5739

## 2022-05-31 NOTE — PROGRESS NOTES
Rounded with Dr. Vel Freedman. MD wants ENT contacted- wants either a CT or MRI of head and neck to check for any further abscess that may be present possibly pushing on carotid. Patient may need pacer if this does not resolve.

## 2022-05-31 NOTE — PROGRESS NOTES
Patient admitted from ER to bed 235. Admission assessment completed and documented on flowsheets. Four eyes skin assessment completed with Kadeem Diop RN. Chlorhexidine bath given. VSS, will continue to monitor.

## 2022-05-31 NOTE — PROGRESS NOTES
Spoke with patient and patient's sister with RN in room. 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 No evidence of mandibular invasion in oral component. No evidence of compression of carotid bulb on CT from tumor. Pathology still pending- will hopefully result tomorrow. Patient anxious to meet with oncologist, will place consult. Understands he will need PET outpatient for further workup before definitive treatment plan.     River Edwards, DO  Otolaryngology

## 2022-05-31 NOTE — ED NOTES
Pt arrived via squad, c/o syncope,  Per EMS pt HR in 30's, hypotensive. Pt arrived HR in 80's. During triage pt HR decreased to 32, pt hypotensive and lethargic. Pt c/o feeling \"very dizzy\". Pt given 0.5 atropine IVP per MD order. Pt continued to show 2nd degree HB rate 35 on monitor. Second dose of 0.5 mg Atropine given per MD verbal order. Isuprel gtt at bedside. Pt remain SR normotensive and asymptomatic at this time.        Yas Chopra RN  05/31/22 3808

## 2022-05-31 NOTE — CONSULTS
Placed call to Interventional Cardiology @ [phone]  RE: symptomatic bradycardia per MD Dr. Roman Hays called back @ [phone] call to Cardiology @ [phone]  Dr. Roman Gonzalez called back @ [phone]

## 2022-06-01 ENCOUNTER — TELEPHONE (OUTPATIENT)
Dept: CARDIOLOGY | Age: 57
End: 2022-06-01

## 2022-06-01 VITALS
HEIGHT: 72 IN | OXYGEN SATURATION: 98 % | HEART RATE: 68 BPM | SYSTOLIC BLOOD PRESSURE: 137 MMHG | WEIGHT: 185.85 LBS | TEMPERATURE: 97.8 F | BODY MASS INDEX: 25.17 KG/M2 | DIASTOLIC BLOOD PRESSURE: 91 MMHG | RESPIRATION RATE: 16 BRPM

## 2022-06-01 PROBLEM — C05.1 MALIGNANT NEOPLASM OF SOFT PALATE (HCC): Status: ACTIVE | Noted: 2022-06-01

## 2022-06-01 PROBLEM — Q21.12 PATENT FORAMEN OVALE WITH RIGHT TO LEFT SHUNT: Status: ACTIVE | Noted: 2022-06-01

## 2022-06-01 PROBLEM — E44.0 MODERATE PROTEIN-CALORIE MALNUTRITION (HCC): Status: ACTIVE | Noted: 2022-06-01

## 2022-06-01 LAB
A/G RATIO: 2.2 (ref 1.1–2.2)
ALBUMIN SERPL-MCNC: 4.1 G/DL (ref 3.4–5)
ALP BLD-CCNC: 69 U/L (ref 40–129)
ALT SERPL-CCNC: 12 U/L (ref 10–40)
ANION GAP SERPL CALCULATED.3IONS-SCNC: 12 MMOL/L (ref 3–16)
AST SERPL-CCNC: 11 U/L (ref 15–37)
BASOPHILS ABSOLUTE: 0.1 K/UL (ref 0–0.2)
BASOPHILS RELATIVE PERCENT: 0.7 %
BILIRUB SERPL-MCNC: 0.3 MG/DL (ref 0–1)
BUN BLDV-MCNC: 12 MG/DL (ref 7–20)
CALCIUM SERPL-MCNC: 8.8 MG/DL (ref 8.3–10.6)
CHLORIDE BLD-SCNC: 101 MMOL/L (ref 99–110)
CO2: 24 MMOL/L (ref 21–32)
CREAT SERPL-MCNC: 0.7 MG/DL (ref 0.9–1.3)
EOSINOPHILS ABSOLUTE: 0.1 K/UL (ref 0–0.6)
EOSINOPHILS RELATIVE PERCENT: 1.9 %
GFR AFRICAN AMERICAN: >60
GFR NON-AFRICAN AMERICAN: >60
GLUCOSE BLD-MCNC: 105 MG/DL (ref 70–99)
HCT VFR BLD CALC: 45.6 % (ref 40.5–52.5)
HEMOGLOBIN: 15.5 G/DL (ref 13.5–17.5)
LYMPHOCYTES ABSOLUTE: 2.6 K/UL (ref 1–5.1)
LYMPHOCYTES RELATIVE PERCENT: 35.2 %
MCH RBC QN AUTO: 30.8 PG (ref 26–34)
MCHC RBC AUTO-ENTMCNC: 34 G/DL (ref 31–36)
MCV RBC AUTO: 90.6 FL (ref 80–100)
MONOCYTES ABSOLUTE: 0.8 K/UL (ref 0–1.3)
MONOCYTES RELATIVE PERCENT: 10.3 %
NEUTROPHILS ABSOLUTE: 3.8 K/UL (ref 1.7–7.7)
NEUTROPHILS RELATIVE PERCENT: 51.9 %
PDW BLD-RTO: 13.6 % (ref 12.4–15.4)
PLATELET # BLD: 273 K/UL (ref 135–450)
PMV BLD AUTO: 7.3 FL (ref 5–10.5)
POTASSIUM REFLEX MAGNESIUM: 4.1 MMOL/L (ref 3.5–5.1)
RBC # BLD: 5.03 M/UL (ref 4.2–5.9)
SODIUM BLD-SCNC: 137 MMOL/L (ref 136–145)
TOTAL PROTEIN: 6 G/DL (ref 6.4–8.2)
WBC # BLD: 7.3 K/UL (ref 4–11)

## 2022-06-01 PROCEDURE — 85025 COMPLETE CBC W/AUTO DIFF WBC: CPT

## 2022-06-01 PROCEDURE — 80053 COMPREHEN METABOLIC PANEL: CPT

## 2022-06-01 PROCEDURE — 36415 COLL VENOUS BLD VENIPUNCTURE: CPT

## 2022-06-01 PROCEDURE — 99233 SBSQ HOSP IP/OBS HIGH 50: CPT | Performed by: INTERNAL MEDICINE

## 2022-06-01 PROCEDURE — 6370000000 HC RX 637 (ALT 250 FOR IP): Performed by: INTERNAL MEDICINE

## 2022-06-01 PROCEDURE — 6360000002 HC RX W HCPCS: Performed by: INTERNAL MEDICINE

## 2022-06-01 PROCEDURE — 2580000003 HC RX 258: Performed by: INTERNAL MEDICINE

## 2022-06-01 RX ADMIN — Medication 10 ML: at 08:40

## 2022-06-01 RX ADMIN — ACETAMINOPHEN 650 MG: 325 TABLET ORAL at 08:50

## 2022-06-01 RX ADMIN — MUPIROCIN: 20 OINTMENT TOPICAL at 08:40

## 2022-06-01 ASSESSMENT — PAIN DESCRIPTION - LOCATION: LOCATION: HEAD

## 2022-06-01 ASSESSMENT — PAIN DESCRIPTION - DESCRIPTORS: DESCRIPTORS: ACHING

## 2022-06-01 ASSESSMENT — PAIN SCALES - GENERAL: PAINLEVEL_OUTOF10: 6

## 2022-06-01 NOTE — PROGRESS NOTES
Monitor company [employer]  Length of monitor 28 days  Monitor ordered by Dr. Eduardo Pollock [id number]  Device number [address] successful prior to pt leaving hospital? Yes  Instructions given to patient, his wife and Greg Samuels RN. Both verbalized understanding. All questions answered to the best of my ability. They requested [employer] call his wife, Melissa Osman first if needed. This information was put on [employer] order.

## 2022-06-01 NOTE — PLAN OF CARE
VSS. NSR on monitor. Patient has had no episodes of bradycardia today. Okay for patient to discharge home on heart monitor. Patient's family spoke with Dr. Vel Freedman in regards to concerns. Patient and patient's wife instructed on how to use heart monitor and purpose, verbalize understanding. Patient plans to call oncologist tomorrow to schedule PET scan per MD request. Patient tolerating diet. Denies any pain. Agrees to discharge. Patient discharged home with wife. Discharge instructions given and all questions answered. Free from any injury.    Problem: Discharge Planning  Goal: Discharge to home or other facility with appropriate resources  Outcome: Adequate for Discharge     Problem: Safety - Adult  Goal: Free from fall injury  Outcome: Adequate for Discharge     Problem: Pain  Goal: Verbalizes/displays adequate comfort level or baseline comfort level  Outcome: Adequate for Discharge

## 2022-06-01 NOTE — PROGRESS NOTES
Assessment complete. Patient agitated and refusing care. Patient refused both lovenox and hydralazine prn for hypertension. Patient educated on need for Lovenox for DVT prophylaxis and patient stated that Angelique Dhillon is going home today so he does not need that\". Patient also informed that he is hypertensive and will likely need hydralazine. Patient states \"of course my blood pressure is high, I have not got any sleep because you guys wake me up every hour and there are alarms always going off\". Dr. Ken Munoz aware. Will continue to monitor.      Sonja Gagnon RN

## 2022-06-01 NOTE — TELEPHONE ENCOUNTER
Monitor company [employer]  Length of monitor 28 days  Monitor ordered by Dr. Gayla Hopper [id number]  Device number [address] successful prior to pt leaving hospital? Yes  Instructions given to patient, his wife and Anyi Cruz RN. Both verbalized understanding. All questions answered to the best of my ability. They requested [employer] call his wife, Connie Pack first if needed. This information was put on [employer] order.

## 2022-06-01 NOTE — FLOWSHEET NOTE
Reassessment complete. Patient is more calm and cooperative. Apologized for behavior this AM. BP significantly better without medications. Patient's wife at bedside. Family waiting to hear from cardiology with questions. Page was placed by RN for cardiology.      Sonja Gagnon RN

## 2022-06-01 NOTE — PROGRESS NOTES
Pt extremely upset about general hospital noises, namely his monitor alarming. Pt raising his voice and stating he cannot get any sleep because the monitor is alarming and he can't stand all of the noises going on around him. Monitor volume in room turned down in attempt to assist pt with sleep.

## 2022-06-01 NOTE — CONSULTS
[facility]    Consult Note      Requesting Physician:  Dr. Estela Appiah:  Probable head and neck carcinoma        HISTORY OF PRESENT ILLNESS:      Mr. Sage Rosales  is a 67-year-old gentleman with a 3.3 x 5.2 cm right oropharyngeal mass crossing midline and extending into the oral cavity. There is also bilateral lymphadenopathy. Biopsy has been performed, but the results are pending. He has some mild difficulty swallowing. He does not like being in the hospital and was angry this morning about his lack of sleep. He was admitted for several episodes of syncope or near syncope. Past Medical History:    History reviewed. No pertinent past medical history. Past Surgical History:    History reviewed. No pertinent surgical history.     Current Medications:    Current Facility-Administered Medications   Medication Dose Route Frequency Provider Last Rate Last Admin    sodium chloride flush 0.9 % injection 10 mL  10 mL IntraVENous 2 times per day Peder Hind DARON Moncadazi, DO   10 mL at 06/01/22 0840    sodium chloride flush 0.9 % injection 10 mL  10 mL IntraVENous PRN Eli Leachjazi, DO        0.9 % sodium chloride infusion   IntraVENous PRN Karmelissa Soriano Derrick, DO        potassium chloride 10 mEq/100 mL IVPB (Peripheral Line)  10 mEq IntraVENous PRN Karlyne Sharonome Derrick, DO        magnesium sulfate 2000 mg in 50 mL IVPB premix  2,000 mg IntraVENous PRN Nafisa Moncadazi, DO        enoxaparin (LOVENOX) injection 40 mg  40 mg SubCUTAneous Daily Ahmad DARON Moncadazi, DO   40 mg at 05/31/22 1209    promethazine (PHENERGAN) tablet 12.5 mg  12.5 mg Oral Q6H PRN Eli Moncadazi, DO        Or    ondansetron (ZOFRAN) injection 4 mg  4 mg IntraVENous Q6H PRN Ahmad A Derrick, DO        acetaminophen (TYLENOL) tablet 650 mg  650 mg Oral Q6H PRN Ahmad A Derrick, DO   650 mg at 06/01/22 0850    Or    acetaminophen (TYLENOL) suppository 650 mg  650 mg Rectal Q6H PRN Josiane Meza DO        nicotine (NICODERM CQ) 21 MG/24HR 1 patch  1 patch TransDERmal Daily Eli Meza DO        perflutren lipid microspheres (DEFINITY) injection 1.65 mg  1.5 mL IntraVENous ONCE PRN Bruno Noland DO        mupirocin (BACTROBAN) 2 % ointment   Nasal BID Loni Ortiz MD   Given at 06/01/22 0840    hydrALAZINE (APRESOLINE) injection 10 mg  10 mg IntraVENous Q4H PRN Loni Ortiz MD   10 mg at 05/31/22 1207    atropine injection 1 mg  1 mg IntraVENous Once Erica Lorenzana MD         Allergies: Allergies   Allergen Reactions    Erythromycin Base      uncoated    Tetanus Toxoid      last injection was at age 13, got very high fever       Social History:   Social History     Socioeconomic History    Marital status:      Spouse name: Not on file    Number of children: Not on file    Years of education: Not on file    Highest education level: Not on file   Occupational History    Not on file   Tobacco Use    Smoking status: Current Every Day Smoker     Packs/day: 0.50     Types: Cigarettes    Smokeless tobacco: Never Used   Vaping Use    Vaping Use: Never used   Substance and Sexual Activity    Alcohol use: Yes     Comment: 2 drinks a week    Drug use: Never    Sexual activity: Not on file   Other Topics Concern    Not on file   Social History Narrative    Not on file     Social Determinants of Health     Financial Resource Strain:     Difficulty of Paying Living Expenses: Not on file   Food Insecurity:     Worried About Running Out of Food in the Last Year: Not on file    Isabel of Food in the Last Year: Not on file   Transportation Needs:     Lack of Transportation (Medical): Not on file    Lack of Transportation (Non-Medical):  Not on file   Physical Activity:     Days of Exercise per Week: Not on file    Minutes of Exercise per Session: Not on file   Stress:     Feeling of Stress : Not on file   Social Connections:     Frequency of Communication with Friends and Family: Not on file    Frequency of Social Gatherings with Friends and Family: Not on file    Attends Mosque Services: Not on file    Active Member of Clubs or Organizations: Not on file    Attends Club or Organization Meetings: Not on file    Marital Status: Not on file   Intimate Partner Violence:     Fear of Current or Ex-Partner: Not on file    Emotionally Abused: Not on file    Physically Abused: Not on file    Sexually Abused: Not on file   Housing Stability:     Unable to Pay for Housing in the Last Year: Not on file    Number of [place] in the Last Year: Not on file    Unstable Housing in the Last Year: Not on file          Family History:     History reviewed. No pertinent family history. REVIEW OF SYSTEMS:      Constitutional:  No weight loss, No fever, No chills, No night sweats. Energy level good.   Eyes:  No impairment or change in vision  ENT / Mouth:  No pain, abnormal ulceration, bleeding, nasal drip or change in voice or hearing  Cardiovascular:  No chest pain, palpitations, new edema, or calf discomfort  Respiratory:  No pain, hemoptysis, change to breathing  Breast:  No pain, discharge, change in appearance or texture  Gastrointestinal:  No pain, cramping, jaundice, change to eating and bowel habits  Urinary:  No pain, bleeding or change in continence  Genitalia: No pain, bleeding or discharge  Musculoskeletal:  No redness, pain, edema or weakness  Skin:  No pruritus, rash, change to nodules or lesions  Neurologic:  No discomfort, change in mental status, speech, sensory or motor activity  Psychiatric:  No change in concentration or change to affect or mood  Endocrine:  No hot flashes, increased thirst, or change to urine production  Hematologic: No petechiae, ecchymosis or bleeding  Lymphatic:  No lymphadenopathy or lymphedema  Allergy / Immunologic:  No eczema, hives, frequent or recurrent Problem List   Diagnosis    Symptomatic bradycardia    Patent foramen ovale with right to left shunt    Malignant neoplasm of soft palate (HCC)       IMPRESSION/RECOMMENDATIONS:    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        Benjamin Goodman MD  Please Contact Through [employer]

## 2022-06-01 NOTE — PROGRESS NOTES
[address]          Cardiology                                                                Progress Note    Admission date:  2022    Subjective:   CC syncope  HPI Pt feels well. He still describes some difficulty opening his mouth. ECG telemetry monitoring demonstrates sinus rhythm with some sinus arrhythmia. The sinus rates have varied from 50 to 90 bpm.  The average heart rate is in the 70s and 80s. No prolonged sinus pauses or AV block are identified. Vitals:  Blood pressure (!) 137/91, pulse 68, temperature 97.8 °F (36.6 °C), temperature source Oral, resp. rate 16, height 6' (1.829 m), weight 185 lb 13.6 oz (84.3 kg), SpO2 98 %.   Temp  Av.2 °F (36.8 °C)  Min: 97.8 °F (36.6 °C)  Max: 98.6 °F (37 °C)  Pulse  Av.9  Min: 55  Max: 96  BP  Min: 111/70  Max: 191/111  SpO2  Av %  Min: 95 %  Max: 98 %    24 hour I/O    Intake/Output Summary (Last 24 hours) at 2022 1834  Last data filed at 2022 1346  Gross per 24 hour   Intake 360 ml   Output 450 ml   Net -90 ml       Objective:     Telemetry monitor:     Physical Exam:  General Appearance:  comfortable  HEENT: pupils equal and reactive, no scleral  icterus  Skin:  Warm and dry  Heart:  Regular, normal apex, S1 and S2 normal  Lungs:  Clear, no wheezing  Abd: soft, non tender  Extremities:  No edema, no cyanosis  Psych: normal affect, oriented X 3      Lab Review     Renal Profile:   Lab Results   Component Value Date    CREATININE 0.7 2022    BUN 12 2022     2022    K 4.1 2022     2022    CO2 24 2022     CBC:    Lab Results   Component Value Date    WBC 7.3 2022    RBC 5.03 2022    HGB 15.5 2022    HCT 45.6 2022    MCV 90.6 2022    RDW 13.6 2022     2022     BNP:  No results found for: BNP  Fasting Lipid Panel:    Lab Results   Component Value Date    CHOL 182 2022    HDL 30 2022    TRIG 113 05/31/2022     Cardiac Enzymes:  CK/MbTroponin  Lab Results   Component Value Date    CKTOTAL 55 05/31/2022    TROPONINI <0.01 05/31/2022     PT/ INR No results found for: INR, PROTIME  PTT No results found for: PTT   Lab Results   Component Value Date    MG 2.10 05/30/2022    No results found for: TSH    Assessment:     1. Right pharyngeal mass-appears to be malignant in origin. PET scan is pending. A course of radiation therapy and chemotherapy is anticipated. 2. Syncope-the patient had some episodes of sinoatrial block with transient sinus slowing on admission, but no significant bradycardia and no symptoms were reproduced. The etiology of the syncope which was described on admission is not clear. The possibility of a vagally mediated episode remains. The ECGs and rhythm strips were reviewed with the family. There is currently no clear indication for permanent pacing. Plan:     1.  28-day ambulatory ECG monitoring. 2. PET scanning  3. Follow-up with EP in 4 weeks.       Binu Noyola MD

## 2022-06-01 NOTE — DISCHARGE SUMMARY
Hospitalist Discharge Summary    Patient ID:  Lewis Chung  [id number]  60 y.o.  1965    Admit date: 5/30/2022    Discharge date: 6/1/2022    Disposition: home    Admission Diagnoses:   Patient Active Problem List   Diagnosis    Symptomatic bradycardia    Patent foramen ovale with right to left shunt    Malignant neoplasm of soft palate (HCC)    Moderate protein-calorie malnutrition ([address]       Discharge Diagnoses: Principal Problem:    Symptomatic bradycardia  Active Problems:    Patent foramen ovale with right to left shunt    Malignant neoplasm of soft palate (HCC)    Moderate protein-calorie malnutrition (HCC)  Resolved Problems:    * No resolved hospital problems. *      Code Status:  Full Code    Condition:  Stable    Discharge Diet: Diet:  ADULT DIET; Regular  ADULT ORAL NUTRITION SUPPLEMENT; Lunch, Dinner; Frozen Oral Supplement    PCP to do list:  Event monitor and follow up with cardiology in 4 weeks, needs aggressive workout for R palate mass - PET scan, chemo options    Hospital Course: 57yo WM with no sig PMHX presents from home with loss of consciousness. This has happened several times in the past month. He has c/o R jaw pain that has progressed to R numbness and tingling. He just had a biopsy of mass in R jaw. Apparently in 11/21 he had a tonsil stone and was trying to remove it himself with a dental pick. HE started to bleed and developed a lot of pain. He was dx twice with tonsillitis since then and was give abx x 2. He has lost 20 pounds in past month due to difficulty eating. The episodes of syncope often occur after eating and he feels dizzy and weird. Wife noted that he as shaking his R hand during the episode. He was profoundly hypotensive and bradycardic and they noted a second degree AV bloack. Atropine was given and he improved.   CT of neck showed large mass as well as satellite metastases which do not appear to be pushing on any nerve specific nerve, but they are in the vicinity bilaterally of nerves. However, without removing all nodes from neck, it would be difficult to determine if this is causing the issue. I did personally review films with radiology. ECHO shows normal function, but possible patent foramen ovale due to left to R shunt. Workup can likely further occur as an outpatient. Pt was seen by ENT and oncology and will have workup ASAP as outpt with Dr. Ismael Guerra for further dx and treatment. Pt will have event monitor at home. This patient meets with criteria for  moderate  malnutrution based on a BMI of  Body mass index is 25.21 kg/m². .  This malnutrition is likely due to R neck mass - likely cancerous causing difficulty with eating . Discharge Medications: There are no discharge medications for this patient. There are no discharge medications for this patient. There are no discharge medications for this patient. There are no discharge medications for this patient. Procedures: none    Assessment on Discharge: Stable, improved     Discharge ROS:  A complete review of systems was asked and negative except for intermittent pain in R side of mouth and tongue - declines wanting viscous lidocaine    Discharge Exam:  /79   Pulse 67   Temp 98.2 °F (36.8 °C) (Oral)   Resp 16   Ht 6' (1.829 m)   Wt 185 lb 13.6 oz (84.3 kg)   SpO2 97%   BMI 25.21 kg/m²     Gen: NAD  HEENT: NC/AT, moist mucous membranes, no oropharyngeal erythema or exudate  Neck: supple, trachea midline, some swelling in face  Heart:  Normal s1/s2, RRR, no murmurs, gallops, or rubs.  no leg edema  Lungs:  clear bilaterally, no wheeze,no rales or rhonchi, no use of accessory muscles  Abd: bowel sounds present, soft, nontender, nondistended, no masses  Extrem:  No clubbing, cyanosis,  no edema  Skin: no lesion or masses  Psych:  A & O x3  Neuro: grossly intact, moves all four extremities    Pertinent Studies During [facility] Stay:  Radiology:  Echo Complete    Result Date: 5/31/2022  Transthoracic Echocardiography Report (TTE)  Demographics   Patient Name       Wesly Cohn   Date of Study      05/31/2022         Gender              Male   Patient Number     [id number]         Date of Birth       1965   Visit Number       [id number]          Age                 60 year(s)   Accession Number   [id number]         Room Number         1414   Corporate ID       [id number]            [facility]   Ordering Physician Ronak Knight.,  [address]                 Physician           Jesus Manuel Osuna MD, [facility]  Procedure Type of Study   TTE procedure:ECHOCARDIOGRAM COMPLETE 2D W DOPPLER W COLOR. Procedure Date Date: 05/31/2022 Start: 09:01 AM Study Location: [address] [place] [address] - Echo Lab Technical Quality: Adequate visualization Additional Indications:encephalopathy AV block . Patient Status: Routine Contrast Medium: Bubble Study. Height: 72 inches Weight: 180 pounds BSA: 2.04 m2 BMI: 24.41 kg/m2 BP: 158/90 mmHg  Conclusions   Summary  Normal left ventricle systolic function with an estimated ejection fraction  of 55%. Septal wall asymmetrical left ventricular hypertrophy is present. No regional wall motion abnormalities are seen. Normal left ventricular diastolic filling pressures. The right ventricle is normal in size and function. Aortic valve appears sclerotic but opens adequately. Mild mitral annular calcification is present. Mild aortic valve regurgitation. Trace tricuspid valve regurgitation. Systolic pulmonary artery pressure (sPAP) is normal and estimated at 27 mmHg  (right atrial pressure 8 mmHg)  A bubble study was performed and shows evidence of right to left shunting  consistent with a patent foramen ovale or atrial septal defect. Pre-mature beats throughout the study.    Signature ------------------------------------------------------------------  Electronically signed by Harrison Winslow MD, [facility]  (Interpreting physician) on 05/31/2022 at 12:43 PM  ------------------------------------------------------------------   Findings   Left Ventricle  Normal left ventricle size and systolic function with an estimated ejection  fraction of 55%. Septal wall asymmetrical left ventricular hypertrophy is present. No regional wall motion abnormalities are seen. Normal left ventricular diastolic filling pressures. Pre-mature beats throughout the study. Mitral Valve  Mild mitral annular calcification is present. No evidence of mitral regurgitation. Left Atrium  The left atrium is normal in size. A bubble study was performed and shows evidence of right to left shunting  consistent with a patent foramen ovale or atrial septal defect. Aortic Valve  Aortic valve appears sclerotic but opens adequately. Mild aortic valve regurgitation. Aorta  The aortic root is normal in size. Right Ventricle  The right ventricle is normal in size and function. TAPSE is measured at 27 mm. S\" Prime Velocity is measured at 13.2 cm/s. Tricuspid Valve  The tricuspid valve is normal in structure  Trace tricuspid valve regurgitation. Systolic pulmonary artery pressure (sPAP) is normal and estimated at 27 mmHg  (right atrial pressure 8 mmHg)   Right Atrium  The right atrial size is normal.  Right atrial area is 16 cm2. Pulmonic Valve  The pulmonic valve is normal in structure and function. No evidence of pulmonic regurgitation. Pericardial Effusion  No pericardial effusion noted. Pleural Effusion  No pleural effusion. Miscellaneous  No obvious masses, thrombi or vegetations are noted. IVC is dilated (> 2.1 cm) and collapses > 50% with respiration consistent  with elevated right atrial pressure (8 mmHg).   M-Mode/2D Measurements (cm)   LV Diastolic Dimension: 4.4 cm  LV Septum Diastolic: 9.76 cm   AO Root Dimension: 3.3 cm  LV PW Diastolic: 9.45 cm       AV Cusp Separation: 1.5 cm                                 LA Area: 18.8 cm2                                 LA volume/Index: 43.33 ml /21 ml/m2  LVOT: 2 cm  Doppler Measurements   AV Peak Velocity: 140 cm/s     MV Peak E-Wave: 102 cm/s  AV Peak Gradient: 7.84 mmHg    MV Peak A-Wave: 86.9 cm/s  LVOT Peak Velocity: 129 cm/s   MV E/A Ratio: 1.17   TR Velocity:220 cm/s  TR Gradient:19.36 mmHg  Estimated RAP:8 mmHg  Estimated RVSP: 27 mmHg  E' Septal Velocity: 10.7 cm/s  E' Lateral Velocity: 10.1 cm/s  E/E' ratio: 9.8   Aortic Valve   Peak Velocity: 140 cm/s  Peak Gradient: 7.84 mmHg   Cusp Separation: 1.5 cm  Aorta   Aortic Root: 3.3 cm  LVOT Diameter: 2 cm      CT SOFT TISSUE NECK W CONTRAST    Result Date: 5/31/2022  EXAMINATION: CT OF THE NECK SOFT TISSUE WITH CONTRAST  5/31/2022 TECHNIQUE: CT of the neck was performed with the administration of intravenous contrast. Multiplanar reformatted images are provided for review. Automated exposure control, iterative reconstruction, and/or weight based adjustment of the mA/kV was utilized to reduce the radiation dose to as low as reasonably achievable. COMPARISON: None. HISTORY: ORDERING SYSTEM PROVIDED HISTORY: Right oropharyngeal mass. TECHNOLOGIST PROVIDED HISTORY: Reason for exam:->Right oropharyngeal mass. Reason for Exam: Right sided jaw pain, unable to open mouth completely. FINDINGS: PHARYNX/LARYNX:  Evaluation of the oral cavity and oropharynx is obscured by streak artifact from dental amalgam.  There is a homogeneously enhancing soft tissue mass in the upper portion of the right oral cavity along the soft palate and palatine tonsil measuring approximately 33 x 52 mm and effacing the right parapharyngeal fat. The mass crosses the midline at the soft palate. The larynx is normal. SALIVARY GLANDS/THYROID:  The parotid and submandibular glands appear unremarkable. The thyroid gland appears unremarkable.  LYMPH NODES: There are slightly necrotic and irregularly marginated metastatic right level-I, II and III lymph nodes. Index right level-III node measures 30 mm. There are metastatic left level-II and possibly level-III lymph nodes. Index left level-II node measures 24 mm. Hyperdense areas in the inderjit metastases could represent calcifications or hyperenhancement. SOFT TISSUES:  There is no definite skull base invasion. The fat within the right infratemporal and pterygopalatine fossa is preserved. BRAIN/ORBITS/SINUSES:  The visualized portion of the intracranial contents appear unremarkable. The visualized portion of the orbits, paranasal sinuses and mastoid air cells demonstrate no acute abnormality. LUNG APICES/SUPERIOR MEDIASTINUM:  No focal consolidation is seen within the visualized lung apices. No superior mediastinal lymphadenopathy or mass. The visualized portion of the trachea appears unremarkable. BONES:  No aggressive appearing lytic or blastic bony lesion. Primary malignant mass in the right soft palate crossing the midline. Bilateral cervical lymph node metastases. RECOMMENDATIONS: Unavailable     XR CHEST PORTABLE    Result Date: 5/31/2022  EXAMINATION: ONE XRAY VIEW OF THE CHEST 5/30/2022 11:58 pm COMPARISON: None. HISTORY: ORDERING SYSTEM PROVIDED HISTORY: bradycardia TECHNOLOGIST PROVIDED HISTORY: Reason for exam:->bradycardia Reason for Exam: bradycardia FINDINGS: No lung infiltrate or consolidation. No pneumothorax or pleural effusion. Heart size is normal.     No acute abnormality. Still awaiting pathology from neck biopsy.       Last Labs on Discharge:     Recent Results (from the past 24 hour(s))   CBC auto differential    Collection Time: 06/01/22  4:21 AM   Result Value Ref Range    WBC 7.3 4.0 - 11.0 K/uL    RBC 5.03 4.20 - 5.90 M/uL    Hemoglobin 15.5 13.5 - 17.5 g/dL    Hematocrit 45.6 40.5 - 52.5 %    MCV 90.6 80.0 - 100.0 fL    MCH 30.8 26.0 - 34.0 pg    MCHC 34.0 31.0 - 36.0 g/dL    RDW 13.6 12.4 - 15.4 %    Platelets 418 423 - 209 K/uL    MPV 7.3 5.0 - 10.5 fL    Neutrophils % 51.9 %    Lymphocytes % 35.2 %    Monocytes % 10.3 %    Eosinophils % 1.9 %    Basophils % 0.7 %    Neutrophils Absolute 3.8 1.7 - 7.7 K/uL    Lymphocytes Absolute 2.6 1.0 - 5.1 K/uL    Monocytes Absolute 0.8 0.0 - 1.3 K/uL    Eosinophils Absolute 0.1 0.0 - 0.6 K/uL    Basophils Absolute 0.1 0.0 - 0.2 K/uL   Comprehensive Metabolic Panel w/ Reflex to MG    Collection Time: 06/01/22  4:21 AM   Result Value Ref Range    Sodium 137 136 - 145 mmol/L    Potassium reflex Magnesium 4.1 3.5 - 5.1 mmol/L    Chloride 101 99 - 110 mmol/L    CO2 24 21 - 32 mmol/L    Anion Gap 12 3 - 16    Glucose 105 (H) 70 - 99 mg/dL    BUN 12 7 - 20 mg/dL    CREATININE 0.7 (L) 0.9 - 1.3 mg/dL    GFR Non-African American >60 >60    GFR African American >60 >60    Calcium 8.8 8.3 - 10.6 mg/dL    Total Protein 6.0 (L) 6.4 - 8.2 g/dL    Albumin 4.1 3.4 - 5.0 g/dL    Albumin/Globulin Ratio 2.2 1.1 - 2.2    Total Bilirubin 0.3 0.0 - 1.0 mg/dL    Alkaline Phosphatase 69 40 - 129 U/L    ALT 12 10 - 40 U/L    AST 11 (L) 15 - 37 U/L         Follow up: with Olman Person DO    Note that 35 minutes was spent in preparing discharge papers, discussing discharge with patient, medication review, etc.    Thank you Olman Person DO for the opportunity to be involved in this patient's care. If you have any questions or concerns please feel free to contact me at [phone].       Electronically signed by Mackenzie Carballo MD on 6/1/2022 at 1:41 PM

## 2022-06-01 NOTE — PROGRESS NOTES
Spoke with Dr. Boyd Maddox. Informed patient and patient's family that MD is willing to come back to discuss care with patient and family but will not be able to do so until 1800. Patient agrees to wait until Dr. Boyd Maddox is available to see patient for questions. Dr. Boyd Maddox also would like patient to discharge on a 24 hour monitor. Dr. Alissa Bentley aware of okay for discharge per cardiology.     Melvin Maravilla RN

## 2022-06-01 NOTE — PROGRESS NOTES
Hospitalist ICU Progress Note    CC: Symptomatic bradycardia    Hospital course:  61yo WM with no sig PMHX presents from home with loss of consciousness. This has happened several times in the past month. He has c/o R jaw pain that has progressed to R numbness and tingling. He just had a biopsy of mass in R jaw. Apparently in 11/21 he had a tonsil stone and was trying to remove it himself with a dental pick. HE started to bleed and developed a lot of pain. He was dx twice with tonsillitis since then and was give abx x 2. He has lost 20 pounds in past month due to difficulty eating. The episodes of syncope often occur after eating and he feels dizzy and weird. Wife noted that he as shaking his R hand during the episode. He was profoundly hypotensive and bradycardic and they noted a second degree AV bloack. Atropine was given and he improved. CT of neck showed large mass as well as satellite metastases which do not appear to be pushing on any nerve specific nerve, but they are in the vicinity bilaterally of nerves. However, without removing all nodes from neck, it would be difficult to determine if this is causing the issue. I did personally review films with radiology. ECHO shows normal function, but possible patent foramen ovale due to left to R shunt. Workup can likely further occur as an outpatient. Pt was seen by ENT and oncology and will have workup ASAP as outpt with Dr. Amrik Conteh for further dx and treatment. Pt will have event monitor at home. Admit date: 5/30/2022  Days in hospital:  1    24 Hour Events: still no biopsy resutls    Subjective: appreciate visit by Dr. Matthew Tapia who has also consulted oncology - ECHO shows possible patent foramen ovale and CT neck shows soft palate mass with metastases - pt irritable this AM due to inability to sleep at night.     ROS:  A comprehensive review of systems was negative except for: intermittent pain in R sided of mouth and tongue  . Objective:    VITALS:  /79   Pulse 67   Temp 98.2 °F (36.8 °C) (Oral)   Resp 16   Ht 6' (1.829 m)   Wt 185 lb 13.6 oz (84.3 kg)   SpO2 97%   BMI 25.21 kg/m²     Gen: NAD  HEENT: NC/AT, moist mucous membranes, no oropharyngeal erythema or exudate  Neck: supple, trachea midline, some swelling in face  Heart:  Normal s1/s2, RRR, no murmurs, gallops, or rubs. no leg edema  Lungs:  clear bilaterally, no wheeze,no rales or rhonchi, no use of accessory muscles  Abd: bowel sounds present, soft, nontender, nondistended, no masses  Extrem:  No clubbing, cyanosis,  no edema  Skin: no lesion or masses  Psych:  A & O x3  Neuro: grossly intact, moves all four extremities    Radiology (personally reviewed by me):  Neck CT:    Impression:     Primary malignant mass in the right soft palate crossing the midline. Bilateral cervical lymph node metastases. Assessment:    Principal Problem:    Symptomatic bradycardia  Active Problems:    Patent foramen ovale with right to left shunt    Malignant neoplasm of soft palate (HCC)    Moderate protein-calorie malnutrition (HCC)  Resolved Problems:    * No resolved hospital problems. *      Plan:  1. Symptomatic bradycardia = awaiting neck CT, then will feed pt - will need to see if mass needs to be removed if it is impinging on nerve and causing arrhythmias  2.  R neck mass - await path results  3. This patient meets with criteria for  moderate  malnutrution based on a BMI of  Body mass index is 24.88 kg/m². .  This malnutrition is likely due to mass in R neck  causing difficulty with eating. Prognosis:  Fair    Code status:  Full code    DVT prophylaxis: Lovenox - refused this AM  GI prophylaxis: none  Antibiotic prophylaxis indicated:   no    Nasal decolonization:  Bactroban  Diet:  ADULT DIET;  Regular  ADULT ORAL NUTRITION SUPPLEMENT; Lunch, Dinner; Frozen Oral Supplement    Disposition:  Home    Medications:  Scheduled Meds:   sodium chloride flush  10 mL IntraVENous 2 times per day    enoxaparin  40 mg SubCUTAneous Daily    nicotine  1 patch TransDERmal Daily    mupirocin   Nasal BID    atropine  1 mg IntraVENous Once       PRN Meds:  sodium chloride flush, sodium chloride, potassium chloride, magnesium sulfate, promethazine **OR** ondansetron, acetaminophen **OR** acetaminophen, perflutren lipid microspheres, hydrALAZINE    IV:   sodium chloride           Intake/Output Summary (Last 24 hours) at 6/1/2022 1340  Last data filed at 6/1/2022 0200  Gross per 24 hour   Intake 500 ml   Output 450 ml   Net 50 ml       Results:  CBC:   Recent Labs     05/30/22 2324 06/01/22 0421   WBC 7.8 7.3   HGB 15.2 15.5   HCT 44.2 45.6   MCV 89.9 90.6    273     BMP:   Recent Labs     05/30/22 2324 06/01/22 0421    137   K 4.0 4.1   CL 97* 101   CO2 23 24   PHOS 4.3  --    BUN 16 12   CREATININE 1.0 0.7*     Mag: No results for input(s): MAG in the last 72 hours. LIVER PROFILE:   Recent Labs     05/30/22 2324 06/01/22 0421   AST 12* 11*   ALT 15 12   BILITOT 0.3 0.3   ALKPHOS 71 69     PT/INR: No results for input(s): PROTIME, INR in the last 72 hours. APTT: No results for input(s): APTT in the last 72 hours.   UA:  Recent Labs     05/31/22  0305   COLORU Yellow   PHUR 6.0  6.0   CLARITYU Clear   SPECGRAV 1.020   LEUKOCYTESUR Negative   UROBILINOGEN 0.2   BILIRUBINUR Negative   BLOODU Negative   GLUCOSEU Negative       ABG: No results found for: PHART, PH, SED9MUV, PCO2, PO2ART, PO2, SLU3WCD, HCO3, BEART, BE, THGBART, THB, ELL2AAW, Q8GGEVAR, O2SAT    Lab Results   Component Value Date    CALCIUM 8.8 06/01/2022    PHOS 4.3 05/30/2022             Electronically signed by Lorenzo Herbert MD on 6/1/2022 at 1:40 PM

## 2022-06-02 ENCOUNTER — TELEPHONE (OUTPATIENT)
Dept: ENT CLINIC | Age: 57
End: 2022-06-02

## 2022-06-02 DIAGNOSIS — C10.9 SQUAMOUS CELL CARCINOMA OF OROPHARYNX (HCC): Primary | ICD-10-CM

## 2022-06-02 LAB
EKG ATRIAL RATE: 50 BPM
EKG ATRIAL RATE: 66 BPM
EKG DIAGNOSIS: NORMAL
EKG DIAGNOSIS: NORMAL
EKG P AXIS: 41 DEGREES
EKG P AXIS: 50 DEGREES
EKG P-R INTERVAL: 154 MS
EKG P-R INTERVAL: 158 MS
EKG Q-T INTERVAL: 374 MS
EKG Q-T INTERVAL: 424 MS
EKG QRS DURATION: 90 MS
EKG QRS DURATION: 90 MS
EKG QTC CALCULATION (BAZETT): 386 MS
EKG QTC CALCULATION (BAZETT): 392 MS
EKG R AXIS: 47 DEGREES
EKG R AXIS: 49 DEGREES
EKG T AXIS: 39 DEGREES
EKG T AXIS: 47 DEGREES
EKG VENTRICULAR RATE: 50 BPM
EKG VENTRICULAR RATE: 66 BPM

## 2022-06-02 PROCEDURE — 93010 ELECTROCARDIOGRAM REPORT: CPT | Performed by: INTERNAL MEDICINE

## 2022-06-02 NOTE — TELEPHONE ENCOUNTER
Spoke with patient and his wife. 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 Spoke with Dr. Perez Palacio through [facility]. Waiting on insurance to approve PET in order to schedule. Would like second opinion, referral placed for Dr. Elías Elliott through [facility].

## 2022-06-02 NOTE — TELEPHONE ENCOUNTER
Patients wife called to let you know that the insurance does not want to cover the PET scan.  Patient thinks that you may have a way to get it approved

## 2022-06-03 ENCOUNTER — TELEPHONE (OUTPATIENT)
Dept: ENT CLINIC | Age: 57
End: 2022-06-03

## 2022-06-03 ENCOUNTER — TELEPHONE (OUTPATIENT)
Dept: CARDIOLOGY CLINIC | Age: 57
End: 2022-06-03

## 2022-06-03 NOTE — TELEPHONE ENCOUNTER
Rhythm strip demonstrates some sinus tachycardia with what appears to be artifact at 1 second intervals. The report suggest that this occurred at 9:20 PM, but the strips are labeled at 1 AM.  I called the patient and left a message for him to call me back.

## 2022-06-03 NOTE — TELEPHONE ENCOUNTER
We received an EVENT strip from . Scanned into media. attatched to this encounter. CELSO saw pt in hosp.  5/27/22

## 2022-06-03 NOTE — TELEPHONE ENCOUNTER
I reached Cherie's voicemail with identifiers. I left a message letting her know that JOSE LUIS ORTA will have to appeal the PET scan as they were the ordering physician.  I asked that she call us back to let us know if they have heard from Dr. Roula Gipson office

## 2022-06-03 NOTE — TELEPHONE ENCOUNTER
Call received from this patients wife Cherie. Connie Pack is upset because the PET scan was not approved by their insurance. Ackerman states that she thinks Dr. Fadumo Becker told her that she could get them an expidited appointment at [facility]. Advised her that per Dr. Dg Cunningham message Dr. Nayana Lange needed to be the one to file the appeal with the insurance company, and that their office would be able to assist them. Advised Cherie that we have sent all of the paperwork to Dr. Gabrielle Sotelo office. She states that their office has not called them to schedule. Cherie will call Dr. Braxton Singleton office to follow up.

## 2022-06-06 ENCOUNTER — APPOINTMENT (OUTPATIENT)
Dept: GENERAL RADIOLOGY | Age: 57
DRG: 243 | End: 2022-06-06
Payer: COMMERCIAL

## 2022-06-06 ENCOUNTER — HOSPITAL ENCOUNTER (INPATIENT)
Age: 57
LOS: 3 days | Discharge: HOME | DRG: 243 | End: 2022-06-09
Attending: EMERGENCY MEDICINE | Admitting: INTERNAL MEDICINE
Payer: COMMERCIAL

## 2022-06-06 ENCOUNTER — TELEPHONE (OUTPATIENT)
Dept: CARDIOLOGY CLINIC | Age: 57
End: 2022-06-06

## 2022-06-06 DIAGNOSIS — R55 SYNCOPE AND COLLAPSE: ICD-10-CM

## 2022-06-06 DIAGNOSIS — I45.5 SINUS PAUSE: Primary | ICD-10-CM

## 2022-06-06 LAB
A/G RATIO: 2.1 (ref 1.1–2.2)
ALBUMIN SERPL-MCNC: 4.6 G/DL (ref 3.4–5)
ALP BLD-CCNC: 80 U/L (ref 40–129)
ALT SERPL-CCNC: 15 U/L (ref 10–40)
ANION GAP SERPL CALCULATED.3IONS-SCNC: 11 MMOL/L (ref 3–16)
AST SERPL-CCNC: 14 U/L (ref 15–37)
BASOPHILS ABSOLUTE: 0.1 K/UL (ref 0–0.2)
BASOPHILS RELATIVE PERCENT: 0.5 %
BILIRUB SERPL-MCNC: <0.2 MG/DL (ref 0–1)
BUN BLDV-MCNC: 18 MG/DL (ref 7–20)
CALCIUM SERPL-MCNC: 9.4 MG/DL (ref 8.3–10.6)
CHLORIDE BLD-SCNC: 101 MMOL/L (ref 99–110)
CO2: 27 MMOL/L (ref 21–32)
CREAT SERPL-MCNC: 0.7 MG/DL (ref 0.9–1.3)
EOSINOPHILS ABSOLUTE: 0.1 K/UL (ref 0–0.6)
EOSINOPHILS RELATIVE PERCENT: 1 %
GFR AFRICAN AMERICAN: >60
GFR NON-AFRICAN AMERICAN: >60
GLUCOSE BLD-MCNC: 129 MG/DL (ref 70–99)
HCT VFR BLD CALC: 46.7 % (ref 40.5–52.5)
HEMOGLOBIN: 15.8 G/DL (ref 13.5–17.5)
LYMPHOCYTES ABSOLUTE: 1.5 K/UL (ref 1–5.1)
LYMPHOCYTES RELATIVE PERCENT: 12.9 %
MAGNESIUM: 2.4 MG/DL (ref 1.8–2.4)
MCH RBC QN AUTO: 30.4 PG (ref 26–34)
MCHC RBC AUTO-ENTMCNC: 34 G/DL (ref 31–36)
MCV RBC AUTO: 89.5 FL (ref 80–100)
MONOCYTES ABSOLUTE: 0.7 K/UL (ref 0–1.3)
MONOCYTES RELATIVE PERCENT: 5.7 %
NEUTROPHILS ABSOLUTE: 9.4 K/UL (ref 1.7–7.7)
NEUTROPHILS RELATIVE PERCENT: 79.9 %
PDW BLD-RTO: 13.7 % (ref 12.4–15.4)
PLATELET # BLD: 285 K/UL (ref 135–450)
PMV BLD AUTO: 7.1 FL (ref 5–10.5)
POTASSIUM SERPL-SCNC: 4.8 MMOL/L (ref 3.5–5.1)
RBC # BLD: 5.21 M/UL (ref 4.2–5.9)
SODIUM BLD-SCNC: 139 MMOL/L (ref 136–145)
TOTAL PROTEIN: 6.8 G/DL (ref 6.4–8.2)
TROPONIN: <0.01 NG/ML
WBC # BLD: 11.8 K/UL (ref 4–11)

## 2022-06-06 PROCEDURE — 84484 ASSAY OF TROPONIN QUANT: CPT

## 2022-06-06 PROCEDURE — 1200000000 HC SEMI PRIVATE

## 2022-06-06 PROCEDURE — 85025 COMPLETE CBC W/AUTO DIFF WBC: CPT

## 2022-06-06 PROCEDURE — 99285 EMERGENCY DEPT VISIT HI MDM: CPT

## 2022-06-06 PROCEDURE — 80053 COMPREHEN METABOLIC PANEL: CPT

## 2022-06-06 PROCEDURE — 83735 ASSAY OF MAGNESIUM: CPT

## 2022-06-06 PROCEDURE — 93005 ELECTROCARDIOGRAM TRACING: CPT | Performed by: EMERGENCY MEDICINE

## 2022-06-06 NOTE — TELEPHONE ENCOUNTER
Unfortunately that doesn't work. He needs a pacemaker. We can try and arrange as outpatient but my advice is to come in for evaluation please.

## 2022-06-06 NOTE — TELEPHONE ENCOUNTER
Pt had 7 sec pause this AM. [employer] contacted pt who said he became dizzy and fell. Wife stated pt was resting at time of call. Ramandeep calling with 4 more pauses that happened today longest one 5.3, other on website.

## 2022-06-06 NOTE — TELEPHONE ENCOUNTER
Event report printed off [employer] and scanned into media. Then sent to [address]. CELSO OOT.    Please advise

## 2022-06-06 NOTE — TELEPHONE ENCOUNTER
Spoke with pt and wife Sandeep Smith, pt stated that he has been diagnosis with throat cancer and every time he take something over the counter he has an episode. Pt stated that he has taken oral gel and nasal spray and had an episode. Pt stated that he does not want to go to the hospital. I did express to pt and wife that this is serious matter. Cherie pt's wife stated that when pt is have an episode she makes if sit up and he does stop having episode.    Please advise

## 2022-06-06 NOTE — TELEPHONE ENCOUNTER
Patient needs to go to ER. He shouldn't drive. I will see as consultation and work him up for pacemaker. Thanks.

## 2022-06-07 LAB
ANION GAP SERPL CALCULATED.3IONS-SCNC: 10 MMOL/L (ref 3–16)
BASOPHILS ABSOLUTE: 0.1 K/UL (ref 0–0.2)
BASOPHILS RELATIVE PERCENT: 1.1 %
BUN BLDV-MCNC: 15 MG/DL (ref 7–20)
CALCIUM SERPL-MCNC: 8.8 MG/DL (ref 8.3–10.6)
CHLORIDE BLD-SCNC: 102 MMOL/L (ref 99–110)
CO2: 25 MMOL/L (ref 21–32)
CREAT SERPL-MCNC: 0.6 MG/DL (ref 0.9–1.3)
EKG ATRIAL RATE: 61 BPM
EKG DIAGNOSIS: NORMAL
EKG P AXIS: 18 DEGREES
EKG P-R INTERVAL: 130 MS
EKG Q-T INTERVAL: 376 MS
EKG QRS DURATION: 80 MS
EKG QTC CALCULATION (BAZETT): 378 MS
EKG R AXIS: 38 DEGREES
EKG T AXIS: 43 DEGREES
EKG VENTRICULAR RATE: 61 BPM
EOSINOPHILS ABSOLUTE: 0.1 K/UL (ref 0–0.6)
EOSINOPHILS RELATIVE PERCENT: 1.5 %
GFR AFRICAN AMERICAN: >60
GFR NON-AFRICAN AMERICAN: >60
GLUCOSE BLD-MCNC: 110 MG/DL (ref 70–99)
HCT VFR BLD CALC: 44.4 % (ref 40.5–52.5)
HEMOGLOBIN: 15 G/DL (ref 13.5–17.5)
LYMPHOCYTES ABSOLUTE: 2.6 K/UL (ref 1–5.1)
LYMPHOCYTES RELATIVE PERCENT: 31.4 %
MCH RBC QN AUTO: 30.3 PG (ref 26–34)
MCHC RBC AUTO-ENTMCNC: 33.8 G/DL (ref 31–36)
MCV RBC AUTO: 89.7 FL (ref 80–100)
MONOCYTES ABSOLUTE: 0.7 K/UL (ref 0–1.3)
MONOCYTES RELATIVE PERCENT: 7.8 %
NEUTROPHILS ABSOLUTE: 4.9 K/UL (ref 1.7–7.7)
NEUTROPHILS RELATIVE PERCENT: 58.2 %
PDW BLD-RTO: 13.7 % (ref 12.4–15.4)
PLATELET # BLD: 264 K/UL (ref 135–450)
PMV BLD AUTO: 7.2 FL (ref 5–10.5)
POTASSIUM REFLEX MAGNESIUM: 3.9 MMOL/L (ref 3.5–5.1)
RBC # BLD: 4.95 M/UL (ref 4.2–5.9)
SODIUM BLD-SCNC: 137 MMOL/L (ref 136–145)
WBC # BLD: 8.4 K/UL (ref 4–11)

## 2022-06-07 PROCEDURE — 85025 COMPLETE CBC W/AUTO DIFF WBC: CPT

## 2022-06-07 PROCEDURE — 6370000000 HC RX 637 (ALT 250 FOR IP): Performed by: INTERNAL MEDICINE

## 2022-06-07 PROCEDURE — 99223 1ST HOSP IP/OBS HIGH 75: CPT | Performed by: INTERNAL MEDICINE

## 2022-06-07 PROCEDURE — 80048 BASIC METABOLIC PNL TOTAL CA: CPT

## 2022-06-07 PROCEDURE — 2580000003 HC RX 258: Performed by: INTERNAL MEDICINE

## 2022-06-07 PROCEDURE — 6360000002 HC RX W HCPCS: Performed by: NURSE PRACTITIONER

## 2022-06-07 PROCEDURE — 36415 COLL VENOUS BLD VENIPUNCTURE: CPT

## 2022-06-07 PROCEDURE — 93010 ELECTROCARDIOGRAM REPORT: CPT | Performed by: INTERNAL MEDICINE

## 2022-06-07 PROCEDURE — 6360000002 HC RX W HCPCS

## 2022-06-07 PROCEDURE — 6360000002 HC RX W HCPCS: Performed by: INTERNAL MEDICINE

## 2022-06-07 PROCEDURE — 94761 N-INVAS EAR/PLS OXIMETRY MLT: CPT

## 2022-06-07 PROCEDURE — 2700000000 HC OXYGEN THERAPY PER DAY

## 2022-06-07 PROCEDURE — 1200000000 HC SEMI PRIVATE

## 2022-06-07 RX ORDER — SODIUM CHLORIDE 0.9 % (FLUSH) 0.9 %
10 SYRINGE (ML) INJECTION EVERY 12 HOURS SCHEDULED
Status: DISCONTINUED | OUTPATIENT
Start: 2022-06-07 | End: 2022-06-09

## 2022-06-07 RX ORDER — ACETAMINOPHEN 325 MG/1
650 TABLET ORAL EVERY 4 HOURS PRN
Status: DISCONTINUED | OUTPATIENT
Start: 2022-06-07 | End: 2022-06-09 | Stop reason: HOSPADM

## 2022-06-07 RX ORDER — OXYCODONE HYDROCHLORIDE AND ACETAMINOPHEN 5; 325 MG/1; MG/1
1 TABLET ORAL EVERY 6 HOURS PRN
Status: DISCONTINUED | OUTPATIENT
Start: 2022-06-07 | End: 2022-06-09 | Stop reason: HOSPADM

## 2022-06-07 RX ORDER — ONDANSETRON 2 MG/ML
4 INJECTION INTRAMUSCULAR; INTRAVENOUS EVERY 4 HOURS PRN
Status: DISCONTINUED | OUTPATIENT
Start: 2022-06-07 | End: 2022-06-09 | Stop reason: HOSPADM

## 2022-06-07 RX ORDER — SODIUM CHLORIDE 9 MG/ML
INJECTION, SOLUTION INTRAVENOUS PRN
Status: DISCONTINUED | OUTPATIENT
Start: 2022-06-07 | End: 2022-06-09 | Stop reason: HOSPADM

## 2022-06-07 RX ORDER — DOBUTAMINE HYDROCHLORIDE 200 MG/100ML
INJECTION INTRAVENOUS
Status: DISPENSED
Start: 2022-06-07 | End: 2022-06-08

## 2022-06-07 RX ORDER — MORPHINE SULFATE 2 MG/ML
INJECTION, SOLUTION INTRAMUSCULAR; INTRAVENOUS
Status: COMPLETED
Start: 2022-06-07 | End: 2022-06-07

## 2022-06-07 RX ORDER — POLYETHYLENE GLYCOL 3350 17 G/17G
17 POWDER, FOR SOLUTION ORAL DAILY PRN
Status: DISCONTINUED | OUTPATIENT
Start: 2022-06-07 | End: 2022-06-09 | Stop reason: HOSPADM

## 2022-06-07 RX ORDER — ACETAMINOPHEN 650 MG/1
650 SUPPOSITORY RECTAL EVERY 4 HOURS PRN
Status: DISCONTINUED | OUTPATIENT
Start: 2022-06-07 | End: 2022-06-09 | Stop reason: HOSPADM

## 2022-06-07 RX ORDER — DOBUTAMINE HYDROCHLORIDE 200 MG/100ML
2.5-1 INJECTION INTRAVENOUS CONTINUOUS
Status: DISCONTINUED | OUTPATIENT
Start: 2022-06-07 | End: 2022-06-09 | Stop reason: HOSPADM

## 2022-06-07 RX ORDER — SODIUM CHLORIDE 9 MG/ML
INJECTION, SOLUTION INTRAVENOUS CONTINUOUS
Status: DISCONTINUED | OUTPATIENT
Start: 2022-06-07 | End: 2022-06-09 | Stop reason: HOSPADM

## 2022-06-07 RX ORDER — SODIUM CHLORIDE 0.9 % (FLUSH) 0.9 %
10 SYRINGE (ML) INJECTION PRN
Status: DISCONTINUED | OUTPATIENT
Start: 2022-06-07 | End: 2022-06-09

## 2022-06-07 RX ORDER — KETOROLAC TROMETHAMINE 30 MG/ML
15 INJECTION, SOLUTION INTRAMUSCULAR; INTRAVENOUS EVERY 6 HOURS PRN
Status: DISCONTINUED | OUTPATIENT
Start: 2022-06-07 | End: 2022-06-09 | Stop reason: HOSPADM

## 2022-06-07 RX ADMIN — MUPIROCIN: 20 OINTMENT TOPICAL at 20:20

## 2022-06-07 RX ADMIN — Medication 10 ML: at 20:20

## 2022-06-07 RX ADMIN — SODIUM CHLORIDE: 9 INJECTION, SOLUTION INTRAVENOUS at 02:00

## 2022-06-07 RX ADMIN — MORPHINE SULFATE 2 MG: 2 INJECTION, SOLUTION INTRAMUSCULAR; INTRAVENOUS at 16:06

## 2022-06-07 RX ADMIN — KETOROLAC TROMETHAMINE 15 MG: 30 INJECTION, SOLUTION INTRAMUSCULAR at 13:08

## 2022-06-07 RX ADMIN — DOBUTAMINE HYDROCHLORIDE 2.5 MCG/KG/MIN: 200 INJECTION INTRAVENOUS at 15:50

## 2022-06-07 ASSESSMENT — ENCOUNTER SYMPTOMS
SHORTNESS OF BREATH: 0
ABDOMINAL PAIN: 0
VOMITING: 0
BACK PAIN: 0

## 2022-06-07 ASSESSMENT — PAIN DESCRIPTION - ORIENTATION: ORIENTATION: RIGHT

## 2022-06-07 ASSESSMENT — PAIN DESCRIPTION - DESCRIPTORS: DESCRIPTORS: SHARP

## 2022-06-07 ASSESSMENT — PAIN SCALES - GENERAL
PAINLEVEL_OUTOF10: 10
PAINLEVEL_OUTOF10: 9
PAINLEVEL_OUTOF10: 0

## 2022-06-07 ASSESSMENT — PAIN DESCRIPTION - LOCATION: LOCATION: MOUTH

## 2022-06-07 NOTE — PROGRESS NOTES
When RN entered room, pt very frustrated regarding no plan/ no MD bedside overnight. RN reached out to cardiology CNP. CNP reached out to Dr. Jl Payne. Plan for him to stop in this morning to evaluate for possible procedure-keep pt NPO.

## 2022-06-07 NOTE — PROGRESS NOTES
RN entered room pt HR in the 30s. Pt pale and very lethargic and difficult to arouse- stating he \"doesn't feel so well. \" HR came back up into the 50's. Pt is talking. Pt HR went back down in the 20s pt became unresponsive. Pacer pads connected, pt paced out of bradycardia. AJIT Perez bedside. Orders for 2 mg of morphine for external pacer. Dr. Saroj Rehman paged. Orders for Dobutamine received and started.

## 2022-06-07 NOTE — PLAN OF CARE
Problem: Discharge Planning  Goal: Discharge to home or other facility with appropriate resources  Outcome: Progressing  Flowsheets (Taken 6/7/2022 0102)  Discharge to home or other facility with appropriate resources:   Identify barriers to discharge with patient and caregiver   Arrange for needed discharge resources and transportation as appropriate     Problem: ABCDS Injury Assessment  Goal: Absence of physical injury  Outcome: Progressing

## 2022-06-07 NOTE — H&P
Hospital Medicine  History and Physical    PCP: Suraj Christianson DO  Patient Name: Akash Zepeda    Date of Service: Pt seen/examined on 6/6/22 and admitted to Inpatient with expected LOS greater than two midnights due to medical therapy    CHIEF COMPLAINT:  Pt c/o episodes in which he felt as though he was going to pass out  HISTORY OF PRESENT ILLNESS: Pt is an 60y.o. year-old male with a history of a malignant neoplasm of the soft palate, a patent foramen ovale with right to left shunt and symptomatic bradycardia. He has been wearing a Holter monitor and had several episodes of sinus pauses today causing presyncopal symptoms. He had several episodes of loss of consciousness over the past month, prompting placement of a Holter monitor at the end of his recent hospital admission. He spoke with cardiology who advised him to come to the emergency room for admission. They plan to place a pacemaker. He is being admitted for further evaluation and treatment. Associated signs and symptoms do not include chest pain, shortness of breath, diaphoresis, edema, orthopnea, paroxysmal nocturnal dyspnea, fever or chills. No recent medication changes. Past Medical History:    History reviewed. No pertinent past medical history. Past Surgical History:    History reviewed. No pertinent surgical history. Allergies:  Erythromycin base and Tetanus toxoid    Medications Prior to Admission:    None    Family History:   Family history is negative for accelerated coronary artery disease, diabetes or malignancies. Social History:   TOBACCO:   reports that he has been smoking cigarettes. He has been smoking about 0.50 packs per day. He has never used smokeless tobacco.  ETOH:   reports current alcohol use.   OCCUPATION:      REVIEW OF SYSTEMS:  A full review of systems was performed and is negative except for that which appears in the HPI    Physical Exam:    Vitals: BP (!) 145/89   Pulse 59   Temp 98.3 °F (36.8 °C) (Oral) Resp 14   SpO2 97%   General appearance: Chronically ill appearing 60y.o. year-old male who is alert, appears stated age and is cooperative  HEENT: Head: Normocephalic, no lesions, without obvious abnormality. Eye: Normal external eye, conjunctiva, lids cornea, PEERL. Ears: Normal external ears. Non-tender. Nose: Normal external nose, mucus membranes and septum. Pharynx: Dental Hygiene adequate. Normal buccal mucosa. Normal pharynx. Neck: no adenopathy, no carotid bruit, no JVD, supple, symmetrical, trachea midline and thyroid not enlarged, symmetric, no tenderness/mass/nodules  Lungs: clear to auscultation bilaterally and no use of accessory muscles  Heart: regular rate and rhythm, S1, S2 normal, no murmur, click, rub or gallop and normal apical impulse  Abdomen: soft, non-tender; bowel sounds normal; no masses, no organomegaly  Extremities: extremities atraumatic, no cyanosis or edema and Homans sign is negative, no sign of DVT. Capillary Refill: Acceptable < 3 seconds   Peripheral Pulses: +3 easily felt, not easily obliterated with pressures   Skin: Skin color, texture, turgor normal. No rashes or lesions on exposed skin  Neurologic: Neurovascularly intact without any focal sensory/motor deficits. Cranial nerves: II-XII intact, grossly non-focal. Gait was not tested.   Mental Status: Alert and oriented, thought content appropriate, normal insight      CBC:   Recent Labs     06/06/22  2221   WBC 11.8*   HGB 15.8        BMP:    Recent Labs     06/06/22  2221      K 4.8      CO2 27   BUN 18   CREATININE 0.7*   GLUCOSE 129*     Troponin:   Recent Labs     06/06/22  2221   TROPONINI <0.01     PT/INR:  No results found for: PTINR  U/A:    Lab Results   Component Value Date    LEUKOCYTESUR Negative 05/31/2022    SPECGRAV 1.020 05/31/2022    UROBILINOGEN 0.2 05/31/2022    BILIRUBINUR Negative 05/31/2022    BLOODU Negative 05/31/2022    GLUCOSEU Negative 05/31/2022    PROTEINU Negative 05/31/2022         RAD:   I have independently reviewed and interpreted the imaging studies below and based my recommendations to the patient on those findings. Echo Complete    Result Date: 5/31/2022  Transthoracic Echocardiography Report (TTE)  Demographics   Patient Name       Simeon Rojas   Date of Study      05/31/2022         Gender              Male   Patient Number     [id number]         Date of Birth       1965   Visit Number       [id number]          Age                 60 year(s)   Accession Number   [id number]         Room Number         0006   Corporate ID       [id number]            [place] [id number]   Ordering Physician Sumaya Guzmán.,  [address] [facility]                 Physician           Zaria De La Torre MD, [facility]  Procedure Type of Study   TTE procedure:ECHOCARDIOGRAM COMPLETE 2D W DOPPLER W COLOR. Procedure Date Date: 05/31/2022 Start: 09:01 AM Study Location: [facility] - Echo Lab Technical Quality: Adequate visualization Additional Indications:encephalopathy AV block . Patient Status: Routine Contrast Medium: Bubble Study. Height: 72 inches Weight: 180 pounds BSA: 2.04 m2 BMI: 24.41 kg/m2 BP: 158/90 mmHg  Conclusions   Summary  Normal left ventricle systolic function with an estimated ejection fraction  of 55%. Septal wall asymmetrical left ventricular hypertrophy is present. No regional wall motion abnormalities are seen. Normal left ventricular diastolic filling pressures. The right ventricle is normal in size and function. Aortic valve appears sclerotic but opens adequately. Mild mitral annular calcification is present. Mild aortic valve regurgitation. Trace tricuspid valve regurgitation.   Systolic pulmonary artery pressure (sPAP) is normal and estimated at 27 mmHg  (right atrial pressure 8 mmHg)  A bubble study was performed and shows evidence of right to left shunting  consistent with a patent foramen ovale or atrial septal defect. Pre-mature beats throughout the study. Signature   ------------------------------------------------------------------  Electronically signed by Delores Goel MD, [facility]  (Interpreting physician) on 05/31/2022 at 12:43 PM  ------------------------------------------------------------------   Findings   Left Ventricle  Normal left ventricle size and systolic function with an estimated ejection  fraction of 55%. Septal wall asymmetrical left ventricular hypertrophy is present. No regional wall motion abnormalities are seen. Normal left ventricular diastolic filling pressures. Pre-mature beats throughout the study. Mitral Valve  Mild mitral annular calcification is present. No evidence of mitral regurgitation. Left Atrium  The left atrium is normal in size. A bubble study was performed and shows evidence of right to left shunting  consistent with a patent foramen ovale or atrial septal defect. Aortic Valve  Aortic valve appears sclerotic but opens adequately. Mild aortic valve regurgitation. Aorta  The aortic root is normal in size. Right Ventricle  The right ventricle is normal in size and function. TAPSE is measured at 27 mm. S\" Prime Velocity is measured at 13.2 cm/s. Tricuspid Valve  The tricuspid valve is normal in structure  Trace tricuspid valve regurgitation. Systolic pulmonary artery pressure (sPAP) is normal and estimated at 27 mmHg  (right atrial pressure 8 mmHg)   Right Atrium  The right atrial size is normal.  Right atrial area is 16 cm2. Pulmonic Valve  The pulmonic valve is normal in structure and function. No evidence of pulmonic regurgitation. Pericardial Effusion  No pericardial effusion noted. Pleural Effusion  No pleural effusion. Miscellaneous  No obvious masses, thrombi or vegetations are noted.   IVC is dilated (> 2.1 cm) and collapses > 50% with respiration consistent with elevated right atrial pressure (8 mmHg). M-Mode/2D Measurements (cm)   LV Diastolic Dimension: 4.4 cm  LV Septum Diastolic: 2.91 cm   AO Root Dimension: 3.3 cm  LV PW Diastolic: 3.10 cm       AV Cusp Separation: 1.5 cm                                 LA Area: 18.8 cm2                                 LA volume/Index: 43.33 ml /21 ml/m2  LVOT: 2 cm  Doppler Measurements   AV Peak Velocity: 140 cm/s     MV Peak E-Wave: 102 cm/s  AV Peak Gradient: 7.84 mmHg    MV Peak A-Wave: 86.9 cm/s  LVOT Peak Velocity: 129 cm/s   MV E/A Ratio: 1.17   TR Velocity:220 cm/s  TR Gradient:19.36 mmHg  Estimated RAP:8 mmHg  Estimated RVSP: 27 mmHg  E' Septal Velocity: 10.7 cm/s  E' Lateral Velocity: 10.1 cm/s  E/E' ratio: 9.8   Aortic Valve   Peak Velocity: 140 cm/s  Peak Gradient: 7.84 mmHg   Cusp Separation: 1.5 cm  Aorta   Aortic Root: 3.3 cm  LVOT Diameter: 2 cm      CT SOFT TISSUE NECK W CONTRAST    Result Date: 5/31/2022  EXAMINATION: CT OF THE NECK SOFT TISSUE WITH CONTRAST  5/31/2022 TECHNIQUE: CT of the neck was performed with the administration of intravenous contrast. Multiplanar reformatted images are provided for review. Automated exposure control, iterative reconstruction, and/or weight based adjustment of the mA/kV was utilized to reduce the radiation dose to as low as reasonably achievable. COMPARISON: None. HISTORY: ORDERING SYSTEM PROVIDED HISTORY: Right oropharyngeal mass. TECHNOLOGIST PROVIDED HISTORY: Reason for exam:->Right oropharyngeal mass. Reason for Exam: Right sided jaw pain, unable to open mouth completely. FINDINGS: PHARYNX/LARYNX:  Evaluation of the oral cavity and oropharynx is obscured by streak artifact from dental amalgam.  There is a homogeneously enhancing soft tissue mass in the upper portion of the right oral cavity along the soft palate and palatine tonsil measuring approximately 33 x 52 mm and effacing the right parapharyngeal fat. The mass crosses the midline at the soft palate.  The larynx is normal. SALIVARY GLANDS/THYROID:  The parotid and submandibular glands appear unremarkable. The thyroid gland appears unremarkable. LYMPH NODES:  There are slightly necrotic and irregularly marginated metastatic right level-I, II and III lymph nodes. Index right level-III node measures 30 mm. There are metastatic left level-II and possibly level-III lymph nodes. Index left level-II node measures 24 mm. Hyperdense areas in the inderjit metastases could represent calcifications or hyperenhancement. SOFT TISSUES:  There is no definite skull base invasion. The fat within the right infratemporal and pterygopalatine fossa is preserved. BRAIN/ORBITS/SINUSES:  The visualized portion of the intracranial contents appear unremarkable. The visualized portion of the orbits, paranasal sinuses and mastoid air cells demonstrate no acute abnormality. LUNG APICES/SUPERIOR MEDIASTINUM:  No focal consolidation is seen within the visualized lung apices. No superior mediastinal lymphadenopathy or mass. The visualized portion of the trachea appears unremarkable. BONES:  No aggressive appearing lytic or blastic bony lesion. Primary malignant mass in the right soft palate crossing the midline. Bilateral cervical lymph node metastases. RECOMMENDATIONS: Unavailable     XR CHEST PORTABLE    Result Date: 5/31/2022  EXAMINATION: ONE XRAY VIEW OF THE CHEST 5/30/2022 11:58 pm COMPARISON: None. HISTORY: ORDERING SYSTEM PROVIDED HISTORY: bradycardia TECHNOLOGIST PROVIDED HISTORY: Reason for exam:->bradycardia Reason for Exam: bradycardia FINDINGS: No lung infiltrate or consolidation. No pneumothorax or pleural effusion. Heart size is normal.     No acute abnormality. EKG:   Read by ER in the absence of a Cardiologist shows  normal sinus rhythm with a rate of 61  Axis is   Normal  QTc is  normal  Intervals and Durations are unremarkable.       ST Segments: no acute change and nonspecific changes  No significant change from prior EKG dated - 5/30/22  No STEMI       Assessment:   Principal Problem:    Sinus pause  Active Problems:    Patent foramen ovale with right to left shunt  Resolved Problems:    * No resolved hospital problems. *      Plan:       Sinus pauses noted on Holter monitor - patient with several episodes of syncope in the last month prompting placement of Holter monitor. He has noted to have several incidents of sinus pauses up to 7 seconds today with presyncopal symptoms. Cardiology directed him to come to the emergency room for admission with plans for placement of a cardiac pacemaker. He will be monitored in the ICU pending cardiology evaluation. Patent foramen ovale with right to left shunt- per Cardiology      Code Status: Full Code  Diet: ADULT DIET; Regular  DVT Prophylaxis: SCDs    (Advanced care planning has been discussed with patient and/or responsible family member and is reflected in the code status.  Further orders associated with this have been entered if appropriate)    Disposition: Anticipate that patient will remain in the hospital for 2 or more midnights depending on further evaluation and clinical course    Please note that over 50 minutes was spent in evaluating the patient, review of records and results, discussion with staff/family, etc.      Burton Caldera MD

## 2022-06-07 NOTE — PROGRESS NOTES
Hospitalist Progress Note      PCP: Kunal Mcknight DO    Date of Admission: 6/6/2022    Chief Complaint: c/o episodes in which he felt as though he was going to pass out    Hospital Course: reviewed     Subjective: pt frustrated about delay in care, notes also ongoing oral pain from cancer       Medications:  Reviewed    Infusion Medications    sodium chloride      sodium chloride 75 mL/hr at 06/07/22 0600     Scheduled Medications    sodium chloride flush  10 mL IntraVENous 2 times per day    mupirocin   Nasal BID     PRN Meds: sodium chloride flush, sodium chloride, ondansetron, polyethylene glycol, acetaminophen **OR** acetaminophen      Intake/Output Summary (Last 24 hours) at 6/7/2022 1045  Last data filed at 6/7/2022 0600  Gross per 24 hour   Intake 540 ml   Output 575 ml   Net -35 ml       Physical Exam Performed:    BP (!) 143/80   Pulse 67   Temp 98.1 °F (36.7 °C) (Oral)   Resp 12   Ht 6' (1.829 m)   Wt 181 lb 14.1 oz (82.5 kg)   SpO2 95%   BMI 24.67 kg/m²     General appearance: No apparent distress, appears stated age and cooperative. HEENT: Pupils equal, round, and reactive to light. Conjunctivae/corneas clear. Neck: Supple, with full range of motion. No jugular venous distention. Trachea midline. Respiratory:  Normal respiratory effort. Clear to auscultation, bilaterally without Rales/Wheezes/Rhonchi. Cardiovascular: Regular rate and rhythm with normal S1/S2 without murmurs, rubs or gallops. Abdomen: Soft, non-tender, non-distended with normal bowel sounds. Musculoskeletal: No clubbing, cyanosis or edema bilaterally. Full range of motion without deformity. Skin: Skin color, texture, turgor normal.  No rashes or lesions. Neurologic:  Neurovascularly intact without any focal sensory/motor deficits.  Cranial nerves: II-XII intact, grossly non-focal.  Psychiatric: Alert and oriented, thought content appropriate, normal insight  Capillary Refill: Brisk,3 seconds, normal   Peripheral Pulses: +2 palpable, equal bilaterally       Labs:   Recent Labs     06/06/22 2221 06/07/22 0413   WBC 11.8* 8.4   HGB 15.8 15.0   HCT 46.7 44.4    264     Recent Labs     06/06/22 2221 06/07/22 0413    137   K 4.8 3.9    102   CO2 27 25   BUN 18 15   CREATININE 0.7* 0.6*   CALCIUM 9.4 8.8     Recent Labs     06/06/22 2221   AST 14*   ALT 15   BILITOT <0.2   ALKPHOS 80     No results for input(s): INR in the last 72 hours. Recent Labs     06/06/22 2221   TROPONINI <0.01       Urinalysis:      Lab Results   Component Value Date    NITRU Negative 05/31/2022    BLOODU Negative 05/31/2022    SPECGRAV 1.020 05/31/2022    GLUCOSEU Negative 05/31/2022       Radiology:  No orders to display           Assessment/Plan:    Active Hospital Problems    Diagnosis     Sinus pause [I45.5]      Priority: Medium    Patent foramen ovale with right to left shunt [Q21.1]      Priority: Medium     Sinus pauses noted on Holter monitor - patient with several episodes of syncope in the last month prompting placement of Holter monitor. He has noted to have several incidents of sinus pauses up to 7 seconds today with presyncopal symptoms. Cardiology directed him to come to the emergency room for admission with plans for placement of a cardiac pacemaker. Pt was monitored in the ICU pending cardiology evaluation.     Patent foramen ovale with right to left shunt- per Cardiology       DVT Prophylaxis: scd  Diet: ADULT DIET;  Regular  Code Status: Full Code    PT/OT Eval Status: not ordered    Dispo - pending workup, cards Fadi Bush MD

## 2022-06-07 NOTE — PROGRESS NOTES
Cardiology CNP called RN to update with plan. Dr. Elijio Claude will be over around noon due to office hours. Additionally room where procedure is done is having maintenance done and cannot be used today. Okay for pt to eat. Pt updated.

## 2022-06-07 NOTE — TELEPHONE ENCOUNTER
Symptomatic pauses. Needs admission to consider pacemaker implantation. Hopeful to perform on Wednesday if he needs one.

## 2022-06-07 NOTE — PROGRESS NOTES
END OF SHIFT SUMMARY:    Patient to room 232 from ED at [address]  Came to ED after holter monitor captured a 7 second pause. Patient has had syncopal events over the past few weeks and was recently admitted for the same thing. Does not take any meds at home and has not been to doctor in [place]". 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 Patient in SB/SR on monitor Lowest HR noted 49. Patient did go to 76s on Sats but recovered to 98%. Possible sleep apnea? Will continue to monitor. Patient NPO for possible PPM 6/7/22.

## 2022-06-07 NOTE — ED PROVIDER NOTES
[address] [facility]  ED  EMERGENCY DEPARTMENT ENCOUNTER        Pt Name: Albania Limon  MRN: [id number]  [place] 1965  Date of evaluation: 6/6/2022  Provider: Liz Maza MD  PCP: Mecca Manrique DO      CHIEF COMPLAINT       Chief Complaint   Patient presents with    Other     has holter monitor on and was told to come to ER becasue he was having pauses (7 sec)       HISTORY OFPRESENT ILLNESS   (Location/Symptom, Timing/Onset, Context/Setting, Quality, Duration, Modifying Factors,Severity)  Note limiting factors. Albania Limon is a 60 y.o. male presenting today due to concern for having multiple episodes today where he felt like he was going to pass out associated with noted pauses on his Holter monitor as long as 7 seconds. Cardiology did initially speak to the patient and recommended he go to the emergency department immediately but he was hesitant to. He thought it may be related to the medications he was taking for pain but this evening he had another episode where he nearly passed out without taking the medications and that finally convinced him that he needed to be evaluated. He currently denies any headache or neck pain. No chest pain or shortness of breath. No reported falls or trauma. He denies feeling lightheaded or dizzy at this time. No reported fever. Due to concern for cardiac arrhythmia, he came to the ED with his wife for further evaluation. He was just admitted 1 week ago for similar complaints and cardiology at this time does feel that he needs a pacemaker. REVIEW OF SYSTEMS    (2-9 systems for level 4, 10 or more for level 5)     Review of Systems   Constitutional: Positive for fatigue. Negative for chills, diaphoresis and fever. HENT: Negative for congestion. Eyes: Positive for visual disturbance (vision blacked out earlier today, denies any concerns currently). Respiratory: Negative for shortness of breath.     Cardiovascular: Negative for chest pain. Gastrointestinal: Negative for abdominal pain and vomiting. Genitourinary: Negative for flank pain. Musculoskeletal: Negative for back pain and neck pain. Skin: Negative for wound (no reported trauma). Neurological: Positive for dizziness, syncope, weakness (generalized - earlier today) and light-headedness (not currently). Negative for numbness and headaches. Psychiatric/Behavioral: Negative for confusion. The patient is nervous/anxious. Positives and Pertinent negatives as per HPI. PASTMEDICAL HISTORY   History reviewed. No pertinent past medical history. SURGICAL HISTORY     History reviewed. No pertinent surgical history. CURRENT MEDICATIONS       Previous Medications    No medications on file       ALLERGIES     Erythromycin base and Tetanus toxoid    FAMILY HISTORY     History reviewed. No pertinent family history. SOCIAL HISTORY       Social History     Socioeconomic History    Marital status:      Spouse name: None    Number of children: None    Years of education: None    Highest education level: None   Occupational History    None   Tobacco Use    Smoking status: Current Every Day Smoker     Packs/day: 0.50     Types: Cigarettes    Smokeless tobacco: Never Used   Vaping Use    Vaping Use: Never used   Substance and Sexual Activity    Alcohol use: Yes     Comment: 2 drinks a week    Drug use: Never    Sexual activity: None   Other Topics Concern    None   Social History Narrative    None     Social Determinants of Health     Financial Resource Strain:     Difficulty of Paying Living Expenses: Not on file   Food Insecurity:     Worried About Running Out of Food in the Last Year: Not on file    Isabel of Food in the Last Year: Not on file   Transportation Needs:     Lack of Transportation (Medical): Not on file    Lack of Transportation (Non-Medical):  Not on file   Physical Activity:     Days of Exercise per Week: Not on file    Minutes of Exercise per Session: Not on file   Stress:     Feeling of Stress : Not on file   Social Connections:     Frequency of Communication with Friends and Family: Not on file    Frequency of Social Gatherings with Friends and Family: Not on file    Attends [employer]ism Services: Not on file    Active Member of [address] [place] [address] or Organizations: Not on file    Attends Club or Organization Meetings: Not on file    Marital Status: Not on file   Intimate Partner Violence:     Fear of Current or Ex-Partner: Not on file    Emotionally Abused: Not on file    Physically Abused: Not on file    Sexually Abused: Not on file   Housing Stability:     Unable to Pay for Housing in the Last Year: Not on file    Number of [place] in the Last Year: Not on file    Unstable Housing in the Last Year: Not on file       SCREENINGS                PHYSICAL EXAM    (up to 7 for level 4, 8 or more for level 5)     ED Triage Vitals [06/06/22 1952]   BP Temp Temp Source Heart Rate Resp SpO2 Height Weight   127/84 97.6 °F (36.4 °C) Oral 65 16 98 % -- --       Physical Exam  Vitals and nursing note reviewed. Constitutional:       General: He is awake. He is not in acute distress. Appearance: Normal appearance. He is well-developed, well-groomed and overweight. He is not ill-appearing, toxic-appearing or diaphoretic. Interventions: He is not intubated. HENT:      Head: Normocephalic and atraumatic. Right Ear: External ear normal.      Left Ear: External ear normal.      Nose: Nose normal.      Mouth/Throat:      Mouth: Mucous membranes are moist.   Eyes:      General: No scleral icterus. Right eye: No discharge. Left eye: No discharge. Neck:      Trachea: Trachea and phonation normal. No tracheal deviation. Cardiovascular:      Rate and Rhythm: Normal rate and regular rhythm. Pulses: Normal pulses. Radial pulses are 2+ on the right side and 2+ on the left side.    Pulmonary:      Effort: Pulmonary effort is normal. No tachypnea, bradypnea, accessory muscle usage, prolonged expiration, respiratory distress or retractions. He is not intubated. Breath sounds: Normal breath sounds and air entry. No stridor. No decreased breath sounds, wheezing, rhonchi or rales. Chest:      Chest wall: No tenderness. Abdominal:      General: Abdomen is flat. Bowel sounds are normal. There is no distension. Palpations: Abdomen is soft. Tenderness: There is no abdominal tenderness. There is no guarding or rebound. Musculoskeletal:         General: No tenderness, deformity or signs of injury. Normal range of motion. Cervical back: Full passive range of motion without pain, normal range of motion and neck supple. No edema, erythema, signs of trauma, rigidity, torticollis, tenderness or crepitus. No pain with movement, spinous process tenderness or muscular tenderness. Normal range of motion. Right lower leg: No edema. Left lower leg: No edema. Skin:     General: Skin is warm and dry. Coloration: Skin is not jaundiced or pale. Findings: No erythema or rash. Neurological:      General: No focal deficit present. Mental Status: He is alert and oriented to person, place, and time. Mental status is at baseline. GCS: GCS eye subscore is 4. GCS verbal subscore is 5. GCS motor subscore is 6. Sensory: Sensation is intact. No sensory deficit. Motor: Motor function is intact. No weakness, tremor, atrophy, abnormal muscle tone or seizure activity. Psychiatric:         Attention and Perception: Attention normal.         Mood and Affect: Affect normal. Mood is anxious. Speech: Speech normal. Speech is not slurred. Behavior: Behavior normal. Behavior is cooperative.              DIAGNOSTIC RESULTS   :    Labs Reviewed   CBC WITH AUTO DIFFERENTIAL - Abnormal; Notable for the following components:       Result Value    WBC 11.8 (*)     Neutrophils Absolute 9.4 (*)     All other components within normal limits   COMPREHENSIVE METABOLIC PANEL - Abnormal; Notable for the following components:    Glucose 129 (*)     CREATININE 0.7 (*)     AST 14 (*)     All other components within normal limits   TROPONIN   MAGNESIUM       All other labs were within normal range or not returned asof this dictation. EKG: All EKG's are interpreted by the Emergency Department Physician who either signs or Co-signs this chart in the absence of a cardiologist.    The Ekg interpreted by me shows  normal sinus rhythm with a rate of 61  Axis is   Normal  QTc is  normal  Intervals and Durations are unremarkable. ST Segments: no acute change and nonspecific changes  No significant change from prior EKG dated - 5/30/22  No STEMI           RADIOLOGY:   Non-plain film images such as CT, Ultrasound and MRI are read by the radiologist. Job Benders images are visualized and preliminarily interpreted by the  ED Provider with the belowfindings:        Interpretation per the Radiologist below, if available at the time of this note:    No orders to display         PROCEDURES   Unless otherwise noted below, none     Procedures    CRITICAL CARE TIME   N/A    CONSULTS: Paged Dr. Shyann Marion for admission. IP CONSULT TO HOSPITALIST  IP CONSULT TO CARDIOLOGY    EMERGENCY DEPARTMENT COURSE and DIFFERENTIAL DIAGNOSIS/MDM:   Vitals:    Vitals:    06/06/22 2215 06/06/22 2235 06/06/22 2251 06/06/22 2257   BP: 106/64      Pulse: 67 78 67 60   Resp: 15 14 14 17   Temp:       TempSrc:       SpO2: 97% 98% 98% 97%       Patient was given the following medications:  Medications - No data to display    Patient was evaluated having multiple episodes today where he reportedly had pauses as long as 7 seconds to the point where he had at least 1 syncopal event and otherwise was not feeling well during these episodes.   He is finally to the point where he thinks he may actually need a pacemaker and cardiology has been recommending this already. He denies any headache or neck pain. He has no chest pain or shortness of breath and troponin was negative. He will need further evaluation in the hospital with urgent cardiology evaluation for possible pacemaker placement. He was stable at time of admission and will need to be on cardiac monitoring, with potential PCU or ICU admission due to concerning story. Hospitalist is aware of the patient and plans to admit. The patient tolerated their visit well. The patient and / or the family were informed of the results of any tests, a time was given to answer questions. FINAL IMPRESSION      1. Sinus pause    2. Syncope and collapse          DISPOSITION/PLAN   DISPOSITION Admitted 06/06/2022 11:05:07 PM      PATIENT REFERRED TO:  No follow-up provider specified.     DISCHARGEMEDICATIONS:  New Prescriptions    No medications on file       DISCONTINUED MEDICATIONS:  Discontinued Medications    No medications on file              (Please note that portions of this note were completed with a voicerecognition program.  Efforts were made to edit the dictations but occasionally words are mis-transcribed.)    Corinna Solorzano MD (electronically signed)            Corinna Solorzano MD  06/07/22 0006

## 2022-06-08 ENCOUNTER — NURSE ONLY (OUTPATIENT)
Dept: CARDIOLOGY CLINIC | Age: 57
End: 2022-06-08

## 2022-06-08 ENCOUNTER — ANESTHESIA EVENT (OUTPATIENT)
Dept: CARDIAC CATH/INVASIVE PROCEDURES | Age: 57
DRG: 243 | End: 2022-06-08
Payer: COMMERCIAL

## 2022-06-08 ENCOUNTER — ANESTHESIA (OUTPATIENT)
Dept: CARDIAC CATH/INVASIVE PROCEDURES | Age: 57
DRG: 243 | End: 2022-06-08
Payer: COMMERCIAL

## 2022-06-08 ENCOUNTER — APPOINTMENT (OUTPATIENT)
Dept: CARDIAC CATH/INVASIVE PROCEDURES | Age: 57
DRG: 243 | End: 2022-06-08
Payer: COMMERCIAL

## 2022-06-08 ENCOUNTER — APPOINTMENT (OUTPATIENT)
Dept: GENERAL RADIOLOGY | Age: 57
DRG: 243 | End: 2022-06-08
Payer: COMMERCIAL

## 2022-06-08 DIAGNOSIS — I44.1 ATRIOVENTRICULAR BLOCK, TYPE II: Primary | ICD-10-CM

## 2022-06-08 DIAGNOSIS — Z95.0 PACEMAKER: ICD-10-CM

## 2022-06-08 LAB
ANION GAP SERPL CALCULATED.3IONS-SCNC: 8 MMOL/L (ref 3–16)
BASOPHILS ABSOLUTE: 0.1 K/UL (ref 0–0.2)
BASOPHILS RELATIVE PERCENT: 1.5 %
BUN BLDV-MCNC: 14 MG/DL (ref 7–20)
CALCIUM SERPL-MCNC: 8.4 MG/DL (ref 8.3–10.6)
CHLORIDE BLD-SCNC: 106 MMOL/L (ref 99–110)
CO2: 23 MMOL/L (ref 21–32)
CREAT SERPL-MCNC: 0.7 MG/DL (ref 0.9–1.3)
EOSINOPHILS ABSOLUTE: 0.1 K/UL (ref 0–0.6)
EOSINOPHILS RELATIVE PERCENT: 1.3 %
GFR AFRICAN AMERICAN: >60
GFR NON-AFRICAN AMERICAN: >60
GLUCOSE BLD-MCNC: 101 MG/DL (ref 70–99)
HCT VFR BLD CALC: 41.6 % (ref 40.5–52.5)
HEMOGLOBIN: 14.2 G/DL (ref 13.5–17.5)
LYMPHOCYTES ABSOLUTE: 2.3 K/UL (ref 1–5.1)
LYMPHOCYTES RELATIVE PERCENT: 27.5 %
MCH RBC QN AUTO: 30.7 PG (ref 26–34)
MCHC RBC AUTO-ENTMCNC: 34.1 G/DL (ref 31–36)
MCV RBC AUTO: 90 FL (ref 80–100)
MONOCYTES ABSOLUTE: 0.6 K/UL (ref 0–1.3)
MONOCYTES RELATIVE PERCENT: 7.1 %
NEUTROPHILS ABSOLUTE: 5.3 K/UL (ref 1.7–7.7)
NEUTROPHILS RELATIVE PERCENT: 62.6 %
PDW BLD-RTO: 13.6 % (ref 12.4–15.4)
PLATELET # BLD: 220 K/UL (ref 135–450)
PMV BLD AUTO: 7.7 FL (ref 5–10.5)
POTASSIUM REFLEX MAGNESIUM: 4.1 MMOL/L (ref 3.5–5.1)
RBC # BLD: 4.62 M/UL (ref 4.2–5.9)
SODIUM BLD-SCNC: 137 MMOL/L (ref 136–145)
WBC # BLD: 8.4 K/UL (ref 4–11)

## 2022-06-08 PROCEDURE — 71046 X-RAY EXAM CHEST 2 VIEWS: CPT

## 2022-06-08 PROCEDURE — 02HK3JZ INSERTION OF PACEMAKER LEAD INTO RIGHT VENTRICLE, PERCUTANEOUS APPROACH: ICD-10-PCS | Performed by: INTERNAL MEDICINE

## 2022-06-08 PROCEDURE — C1898 LEAD, PMKR, OTHER THAN TRANS: HCPCS

## 2022-06-08 PROCEDURE — 0JH606Z INSERTION OF PACEMAKER, DUAL CHAMBER INTO CHEST SUBCUTANEOUS TISSUE AND FASCIA, OPEN APPROACH: ICD-10-PCS | Performed by: INTERNAL MEDICINE

## 2022-06-08 PROCEDURE — 36415 COLL VENOUS BLD VENIPUNCTURE: CPT

## 2022-06-08 PROCEDURE — 2709999900 HC NON-CHARGEABLE SUPPLY

## 2022-06-08 PROCEDURE — 02H63JZ INSERTION OF PACEMAKER LEAD INTO RIGHT ATRIUM, PERCUTANEOUS APPROACH: ICD-10-PCS | Performed by: INTERNAL MEDICINE

## 2022-06-08 PROCEDURE — 6370000000 HC RX 637 (ALT 250 FOR IP): Performed by: INTERNAL MEDICINE

## 2022-06-08 PROCEDURE — 85025 COMPLETE CBC W/AUTO DIFF WBC: CPT

## 2022-06-08 PROCEDURE — 2500000003 HC RX 250 WO HCPCS

## 2022-06-08 PROCEDURE — 3700000000 HC ANESTHESIA ATTENDED CARE

## 2022-06-08 PROCEDURE — 6360000002 HC RX W HCPCS: Performed by: NURSE ANESTHETIST, CERTIFIED REGISTERED

## 2022-06-08 PROCEDURE — 2580000003 HC RX 258: Performed by: INTERNAL MEDICINE

## 2022-06-08 PROCEDURE — 2000000000 HC ICU R&B

## 2022-06-08 PROCEDURE — C1889 IMPLANT/INSERT DEVICE, NOC: HCPCS

## 2022-06-08 PROCEDURE — 33208 INSRT HEART PM ATRIAL & VENT: CPT | Performed by: INTERNAL MEDICINE

## 2022-06-08 PROCEDURE — 80048 BASIC METABOLIC PNL TOTAL CA: CPT

## 2022-06-08 PROCEDURE — 33208 INSRT HEART PM ATRIAL & VENT: CPT

## 2022-06-08 PROCEDURE — C1785 PMKR, DUAL, RATE-RESP: HCPCS

## 2022-06-08 PROCEDURE — 3700000001 HC ADD 15 MINUTES (ANESTHESIA)

## 2022-06-08 PROCEDURE — 93005 ELECTROCARDIOGRAM TRACING: CPT | Performed by: INTERNAL MEDICINE

## 2022-06-08 RX ORDER — SODIUM CHLORIDE 0.9 % (FLUSH) 0.9 %
5-40 SYRINGE (ML) INJECTION EVERY 12 HOURS SCHEDULED
Status: DISCONTINUED | OUTPATIENT
Start: 2022-06-08 | End: 2022-06-09 | Stop reason: HOSPADM

## 2022-06-08 RX ORDER — DOXYCYCLINE HYCLATE 100 MG
100 TABLET ORAL EVERY 12 HOURS SCHEDULED
Qty: 10 TABLET | Refills: 0 | Status: SHIPPED | OUTPATIENT
Start: 2022-06-08 | End: 2022-06-13

## 2022-06-08 RX ORDER — SODIUM CHLORIDE 0.9 % (FLUSH) 0.9 %
5-40 SYRINGE (ML) INJECTION PRN
Status: DISCONTINUED | OUTPATIENT
Start: 2022-06-08 | End: 2022-06-09 | Stop reason: HOSPADM

## 2022-06-08 RX ORDER — MIDAZOLAM HYDROCHLORIDE 1 MG/ML
INJECTION INTRAMUSCULAR; INTRAVENOUS PRN
Status: DISCONTINUED | OUTPATIENT
Start: 2022-06-08 | End: 2022-06-08 | Stop reason: SDUPTHER

## 2022-06-08 RX ORDER — SODIUM CHLORIDE 9 MG/ML
INJECTION, SOLUTION INTRAVENOUS PRN
Status: DISCONTINUED | OUTPATIENT
Start: 2022-06-08 | End: 2022-06-09 | Stop reason: HOSPADM

## 2022-06-08 RX ORDER — DOXYCYCLINE HYCLATE 100 MG
100 TABLET ORAL EVERY 12 HOURS SCHEDULED
Status: DISCONTINUED | OUTPATIENT
Start: 2022-06-08 | End: 2022-06-09 | Stop reason: HOSPADM

## 2022-06-08 RX ADMIN — Medication 10 ML: at 20:49

## 2022-06-08 RX ADMIN — MIDAZOLAM HYDROCHLORIDE 0.5 MG: 2 INJECTION, SOLUTION INTRAMUSCULAR; INTRAVENOUS at 08:35

## 2022-06-08 RX ADMIN — MUPIROCIN: 20 OINTMENT TOPICAL at 20:49

## 2022-06-08 RX ADMIN — MIDAZOLAM HYDROCHLORIDE 0.5 MG: 2 INJECTION, SOLUTION INTRAMUSCULAR; INTRAVENOUS at 08:40

## 2022-06-08 RX ADMIN — DOXYCYCLINE HYCLATE 100 MG: 100 TABLET, COATED ORAL at 20:49

## 2022-06-08 RX ADMIN — SODIUM CHLORIDE: 9 INJECTION, SOLUTION INTRAVENOUS at 05:14

## 2022-06-08 RX ADMIN — MIDAZOLAM HYDROCHLORIDE 0.5 MG: 2 INJECTION, SOLUTION INTRAMUSCULAR; INTRAVENOUS at 08:25

## 2022-06-08 RX ADMIN — DOXYCYCLINE HYCLATE 100 MG: 100 TABLET, COATED ORAL at 16:00

## 2022-06-08 RX ADMIN — MIDAZOLAM HYDROCHLORIDE 1 MG: 2 INJECTION, SOLUTION INTRAMUSCULAR; INTRAVENOUS at 08:30

## 2022-06-08 ASSESSMENT — PAIN SCALES - GENERAL
PAINLEVEL_OUTOF10: 0
PAINLEVEL_OUTOF10: 0

## 2022-06-08 NOTE — PROGRESS NOTES
Patient arrived in PACU at this time and placed on monitor. Report received from cath LAb RN and [facility]. Will continue to monitor.

## 2022-06-08 NOTE — TELEPHONE ENCOUNTER
Noami from [employer] wanted to know if we wanted to continue the cardiac monitor since the pt is currently in the hospital. [phone].

## 2022-06-08 NOTE — PROGRESS NOTES
Hospitalist Progress Note      PCP: Cal Teran DO    Date of Admission: 6/6/2022         Chief Complaint: c/o episodes in which he felt as though he was going to pass out  [address] 76 had pacer today, wife at bedside, apparently pt had another syncopal episode this evening per ICU nursing staff      Medications:  Reviewed    Infusion Medications    sodium chloride      sodium chloride      sodium chloride 75 mL/hr at 06/08/22 0514    DOBUTamine Stopped (06/08/22 0403)     Scheduled Medications    sodium chloride flush  5-40 mL IntraVENous 2 times per day    doxycycline hyclate  100 mg Oral 2 times per day    sodium chloride flush  10 mL IntraVENous 2 times per day    mupirocin   Nasal BID     PRN Meds: sodium chloride flush, sodium chloride, sodium chloride flush, sodium chloride, ondansetron, polyethylene glycol, acetaminophen **OR** acetaminophen, ketorolac, oxyCODONE-acetaminophen      Intake/Output Summary (Last 24 hours) at 6/8/2022 1701  Last data filed at 6/8/2022 1400  Gross per 24 hour   Intake 1150.64 ml   Output 1000 ml   Net 150.64 ml       Physical Exam Performed:    BP (!) 154/90   Pulse (!) 110   Temp 97.9 °F (36.6 °C) (Temporal)   Resp 20   Ht 6' (1.829 m)   Wt 180 lb 1.9 oz (81.7 kg)   SpO2 98%   BMI 24.43 kg/m²     General appearance: No apparent distress, appears stated age and cooperative. HEENT: Pupils equal, round, and reactive to light. Conjunctivae/corneas clear. Neck: Supple, with full range of motion. No jugular venous distention. Trachea midline. Respiratory:  Normal respiratory effort. Clear to auscultation, bilaterally without Rales/Wheezes/Rhonchi. Chest: left chest pacer dressing c/d/i  Cardiovascular: Regular rate and rhythm with normal S1/S2 without murmurs, rubs or gallops. Abdomen: Soft, non-tender, non-distended with normal bowel sounds. Musculoskeletal: No clubbing, cyanosis or edema bilaterally.   Full range of motion without deformity. Skin: Skin color, texture, turgor normal.  No rashes or lesions. Neurologic:  Neurovascularly intact without any focal sensory/motor deficits. Cranial nerves: II-XII intact, grossly non-focal.  Psychiatric: Alert and oriented, thought content appropriate, normal insight  Capillary Refill: Brisk,3 seconds, normal   Peripheral Pulses: +2 palpable, equal bilaterally     Labs:   Recent Labs     06/06/22 2221 06/07/22 0413 06/08/22 0414   WBC 11.8* 8.4 8.4   HGB 15.8 15.0 14.2   HCT 46.7 44.4 41.6    264 220     Recent Labs     06/06/22 2221 06/07/22 0413 06/08/22 0414    137 137   K 4.8 3.9 4.1    102 106   CO2 27 25 23   BUN 18 15 14   CREATININE 0.7* 0.6* 0.7*   CALCIUM 9.4 8.8 8.4     Recent Labs     06/06/22 2221   AST 14*   ALT 15   BILITOT <0.2   ALKPHOS 80     No results for input(s): INR in the last 72 hours. Recent Labs     06/06/22 2221   TROPONINI <0.01       Urinalysis:      Lab Results   Component Value Date    NITRU Negative 05/31/2022    BLOODU Negative 05/31/2022    SPECGRAV 1.020 05/31/2022    GLUCOSEU Negative 05/31/2022       Radiology:  XR CHEST (2 VW)   Final Result   Status post left ICD placement without evidence of pneumothorax.                  Assessment/Plan:    Active Hospital Problems    Diagnosis     Sinus pause [I45.5]      Priority: Medium    Patent foramen ovale with right to left shunt [Q21.1]      Priority: Medium     Sinus pauses noted on Holter monitor - patient with several episodes of syncope in the last month prompting placement of Holter monitor. [facility] has noted to have several incidents of sinus pauses up to 7 seconds with presyncopal symptoms.  Cardiology directed him to come to the emergency room for admission with plans for placement of a cardiac pacemaker.  Pt was monitored in the ICU pending cardiology evaluation.   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

## 2022-06-08 NOTE — PROGRESS NOTES
Pt up and ambulating in the room dressing for discharge with wife. Pt had dizzy spell and became lightheaded. Vitals taken. Paged MDs.  Discharged order cancelled

## 2022-06-08 NOTE — PROGRESS NOTES
Hospitalist Progress Note      PCP: Peter Lee DO    Date of Admission: 6/6/2022      Chief Complaint: c/o episodes in which he felt as though he was going to pass out     Hospital Course: reviewed      Subjective: pt had pacer, wife at bedside       Medications:  Reviewed    Infusion Medications    sodium chloride      sodium chloride      sodium chloride 75 mL/hr at 06/08/22 0514    DOBUTamine Stopped (06/08/22 0403)     Scheduled Medications    sodium chloride flush  5-40 mL IntraVENous 2 times per day    doxycycline hyclate  100 mg Oral 2 times per day    sodium chloride flush  10 mL IntraVENous 2 times per day    mupirocin   Nasal BID     PRN Meds: sodium chloride flush, sodium chloride, sodium chloride flush, sodium chloride, ondansetron, polyethylene glycol, acetaminophen **OR** acetaminophen, ketorolac, oxyCODONE-acetaminophen      Intake/Output Summary (Last 24 hours) at 6/8/2022 1659  Last data filed at 6/8/2022 1400  Gross per 24 hour   Intake 1150.64 ml   Output 1000 ml   Net 150.64 ml       Physical Exam Performed:    BP (!) 154/90   Pulse (!) 110   Temp 97.9 °F (36.6 °C) (Temporal)   Resp 20   Ht 6' (1.829 m)   Wt 180 lb 1.9 oz (81.7 kg)   SpO2 98%   BMI 24.43 kg/m²     General appearance: No apparent distress, appears stated age and cooperative. HEENT: Pupils equal, round, and reactive to light. Conjunctivae/corneas clear. Neck: Supple, with full range of motion. No jugular venous distention. Trachea midline. Respiratory:  Normal respiratory effort. Clear to auscultation, bilaterally without Rales/Wheezes/Rhonchi. Chest: left chest pacer dressing c/d/i  Cardiovascular: Regular rate and rhythm with normal S1/S2 without murmurs, rubs or gallops. Abdomen: Soft, non-tender, non-distended with normal bowel sounds. Musculoskeletal: No clubbing, cyanosis or edema bilaterally. Full range of motion without deformity.   Skin: Skin color, texture, turgor normal.  No rashes or lesions. Neurologic:  Neurovascularly intact without any focal sensory/motor deficits. Cranial nerves: II-XII intact, grossly non-focal.  Psychiatric: Alert and oriented, thought content appropriate, normal insight  Capillary Refill: Brisk,3 seconds, normal   Peripheral Pulses: +2 palpable, equal bilaterally       Labs:   Recent Labs     06/06/22 2221 06/07/22 0413 06/08/22 0414   WBC 11.8* 8.4 8.4   HGB 15.8 15.0 14.2   HCT 46.7 44.4 41.6    264 220     Recent Labs     06/06/22 2221 06/07/22 0413 06/08/22 0414    137 137   K 4.8 3.9 4.1    102 106   CO2 27 25 23   BUN 18 15 14   CREATININE 0.7* 0.6* 0.7*   CALCIUM 9.4 8.8 8.4     Recent Labs     06/06/22 2221   AST 14*   ALT 15   BILITOT <0.2   ALKPHOS 80     No results for input(s): INR in the last 72 hours. Recent Labs     06/06/22 2221   TROPONINI <0.01       Urinalysis:      Lab Results   Component Value Date    NITRU Negative 05/31/2022    BLOODU Negative 05/31/2022    SPECGRAV 1.020 05/31/2022    GLUCOSEU Negative 05/31/2022       Radiology:  XR CHEST (2 VW)   Final Result   Status post left ICD placement without evidence of pneumothorax.                  Assessment/Plan:    Active Hospital Problems    Diagnosis     Sinus pause [I45.5]      Priority: Medium    Patent foramen ovale with right to left shunt [Q21.1]      Priority: Medium        Sinus pauses noted on Holter monitor - patient with several episodes of syncope in the last month prompting placement of Holter monitor. [facility] has noted to have several incidents of sinus pauses up to 7 seconds with presyncopal symptoms.  Cardiology directed him to come to the emergency room for admission with plans for placement of a cardiac pacemaker.  Pt was monitored in the ICU pending cardiology evaluation.   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

## 2022-06-08 NOTE — CARE COORDINATION
Spoke with patient and wife at bedside. Patient lives at home with his wife and two sons. He is independent at home and works full time. He denies any DME or services at home. At time of readmission screening, he told CM he does not have a PCP. Offered him a list of PCP's but he states he does not want one; that he will just continue to follow up with his Oncologist.  Offered him [facility] information and he accepted. No other needs identified and discharge order is in.

## 2022-06-08 NOTE — ANESTHESIA PRE PROCEDURE
Department of Anesthesiology  Preprocedure Note       Name:  Bailee Salter   Age:  60 y.o.  :  1965                                          MRN:  [id number]         Date:  2022      Surgeon: * Surgery not found *    Procedure:     Medications prior to admission:   Prior to Admission medications    Not on File       Current medications:    Current Facility-Administered Medications   Medication Dose Route Frequency Provider Last Rate Last Admin    sodium chloride flush 0.9 % injection 10 mL  10 mL IntraVENous 2 times per day Jackie Liang MD   10 mL at 22    sodium chloride flush 0.9 % injection 10 mL  10 mL IntraVENous PRN Jackie Liang MD        0.9 % sodium chloride infusion   IntraVENous PRN Jackie Liang MD        ondansetron [facility]) injection 4 mg  4 mg IntraVENous Q4H PRN Jackie Liang MD        polyethylene glycol [facility]) packet 17 g  17 g Oral Daily PRN Jackie Liang MD        acetaminophen (TYLENOL) tablet 650 mg  650 mg Oral Q4H PRN Jackie Liang MD        Or    acetaminophen (TYLENOL) suppository 650 mg  650 mg Rectal Q4H PRN Jackie Liang MD        0.9 % sodium chloride infusion   IntraVENous Continuous Jackie Liang MD 75 mL/hr at 22 0514 New Bag at 22 0514    mupirocin (BACTROBAN) 2 % ointment   Nasal BID Terence Mcgill MD   Given at 22 2020    ketorolac (TORADOL) injection 15 mg  15 mg IntraVENous Q6H PRN Terence Mcgill MD   15 mg at 22 1308    oxyCODONE-acetaminophen (PERCOCET) 5-325 MG per tablet 1 tablet  1 tablet Oral Q6H PRN Terence Mcgill MD        DOBUTamine (DOBUTREX) 500 mg in dextrose 5 % 250 mL infusion  2.5-10 mcg/kg/min IntraVENous Continuous RAMON Solis - CNP   Stopped at 22 0403       Allergies:     Allergies   Allergen Reactions    Erythromycin Base      uncoated    Tetanus Toxoid      last injection was at age 13, got very high fever       Problem List:    Patient Active Problem List   Diagnosis Code    Symptomatic bradycardia R00.1    Patent foramen ovale with right to left shunt Q21.1    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 No pertinent past medical history. Past Surgical History:  History reviewed. No pertinent surgical history. Social History:    Social History     Tobacco Use    Smoking status: Current Every Day Smoker     Packs/day: 0.50     Types: Cigarettes    Smokeless tobacco: Never Used   Substance Use Topics    Alcohol use: Yes     Comment: 2 drinks a week                                Ready to quit: Not Answered  Counseling given: Not Answered      Vital Signs (Current):   Vitals:    06/08/22 0530 06/08/22 0600 06/08/22 0630 06/08/22 0700   BP: (!) 176/103 (!) 154/90 (!) 170/105 (!) 185/100   Pulse: 63 57 81 69   Resp: 12 10 20 10   Temp:       TempSrc:       SpO2:    98%   Weight:       Height:                                                  BP Readings from Last 3 Encounters:   06/08/22 (!) 185/100   06/01/22 (!) 137/91       NPO Status:                                                                                 BMI:   Wt Readings from Last 3 Encounters:   06/08/22 180 lb 1.9 oz (81.7 kg)   06/01/22 185 lb 13.6 oz (84.3 kg)   05/27/22 180 lb (81.6 kg)     Body mass index is 24.43 kg/m².     CBC:   Lab Results   Component Value Date    WBC 8.4 06/08/2022    RBC 4.62 06/08/2022    HGB 14.2 06/08/2022    HCT 41.6 06/08/2022    MCV 90.0 06/08/2022    RDW 13.6 06/08/2022     06/08/2022       CMP:   Lab Results   Component Value Date     06/08/2022    K 4.1 06/08/2022     06/08/2022    CO2 23 06/08/2022    BUN 14 06/08/2022    CREATININE 0.7 06/08/2022    GFRAA >60 06/08/2022    AGRATIO 2.1 06/06/2022    LABGLOM >60 06/08/2022    GLUCOSE 101 06/08/2022    PROT 6.8 06/06/2022    CALCIUM 8.4 06/08/2022 BILITOT <0.2 06/06/2022    ALKPHOS 80 06/06/2022    AST 14 06/06/2022    ALT 15 06/06/2022       POC Tests: No results for input(s): POCGLU, POCNA, POCK, POCCL, POCBUN, POCHEMO, POCHCT in the last 72 hours. Coags: No results found for: PROTIME, INR, APTT    HCG (If Applicable): No results found for: PREGTESTUR, PREGSERUM, HCG, HCGQUANT     ABGs: No results found for: PHART, PO2ART, DZO9KKG, BJH8JHV, BEART, D7ICCCVI     Type & Screen (If Applicable):  No results found for: LABABO, LABRH    Drug/Infectious Status (If Applicable):  No results found for: HIV, HEPCAB    COVID-19 Screening (If Applicable): No results found for: COVID19        Anesthesia Evaluation  Patient summary reviewed and Nursing notes reviewed no history of anesthetic complications:   Airway: Mallampati: IV     Neck ROM: full  Mouth opening: < 3 FB   Dental:          Pulmonary:Negative Pulmonary ROS and normal exam                               Cardiovascular:Negative CV ROS    (+) dysrhythmias (symptomatic bradycardia):,                   Neuro/Psych:   Negative Neuro/Psych ROS              GI/Hepatic/Renal: Neg GI/Hepatic/Renal ROS       (-) hiatal hernia and GERD       Endo/Other: Negative Endo/Other ROS                     ROS comment: Soft palate mass Abdominal:             Vascular: Other Findings:           Anesthesia Plan      general     ASA 4     (I discussed with the patient the risks and benefits of PIV, general anesthesia, IV Narcotics, PACU. All questions were answered the patient agrees with the plan and wishes to proceed. Patient with soft palate mass, CT reviewed. Extremely limited mouth opening. Discussed with patient and surgeon light mac anesthesia with localization. Both are agreeable.  )  Induction: intravenous. Pre-Operative Diagnosis: Syncope and collapse [R55]; Sinus pause [I45.5]    60 y.o.   BMI:  Body mass index is 24.43 kg/m².      Vitals:    06/08/22 0530 06/08/22 0600 06/08/22 0630 06/08/22 0700   BP: (!) 176/103 (!) 154/90 (!) 170/105 (!) 185/100   Pulse: 63 57 81 69   Resp: 12 10 20 10   Temp:       TempSrc:       SpO2:    98%   Weight:       Height:           Allergies   Allergen Reactions    Erythromycin Base      uncoated    Tetanus Toxoid      last injection was at age 13, got very high fever       Social History     Tobacco Use    Smoking status: Current Every Day Smoker     Packs/day: 0.50     Types: Cigarettes    Smokeless tobacco: Never Used   Substance Use Topics    Alcohol use: Yes     Comment: 2 drinks a week       LABS:    CBC  Lab Results   Component Value Date/Time    WBC 8.4 06/08/2022 04:14 AM    HGB 14.2 06/08/2022 04:14 AM    HCT 41.6 06/08/2022 04:14 AM     06/08/2022 04:14 AM     RENAL  Lab Results   Component Value Date/Time     06/08/2022 04:14 AM    K 4.1 06/08/2022 04:14 AM     06/08/2022 04:14 AM    CO2 23 06/08/2022 04:14 AM    BUN 14 06/08/2022 04:14 AM    CREATININE 0.7 (L) 06/08/2022 04:14 AM    GLUCOSE 101 (H) 06/08/2022 04:14 AM     COAGS  No results found for: PROTIME, INR, APTT      Neena Amor MD   6/8/2022

## 2022-06-08 NOTE — PROGRESS NOTES
Dr. Elijio Claude bedside. Orders to keep pt in ICU overnight and plan for RA lead revision tomorrow afternoon. NPO at might night.

## 2022-06-08 NOTE — TELEPHONE ENCOUNTER
Spoke with Ramandeep from [employer] and relayed message from [facility] [place],[address]. Ramandeep v/u.

## 2022-06-08 NOTE — ANESTHESIA POSTPROCEDURE EVALUATION
Department of Anesthesiology  Postprocedure Note    Patient: Linnell Boeck  MRN: [id number]  YOB: 1965  Date of evaluation: 6/8/2022  Time:  6:19 PM     Procedure Summary     Date: 06/08/22 Room / Location: [facility] Cardiac Cath Lab    Anesthesia Start: [address] Anesthesia Stop: 2262    Procedure: PACEMAKER Diagnosis:     Scheduled Providers:  Responsible Provider: Stefani Panchal MD    Anesthesia Type: general ASA Status: 4          Anesthesia Type: No value filed. Mauro Phase I: Mauro Score: 9    Mauro Phase II:      Last vitals: Reviewed and per EMR flowsheets.        Anesthesia Post Evaluation    Comments: Postoperative Anesthesia Note    Name:    Linnell Boeck  MRN:      [id number]    Patient Vitals in the past 12 hrs:  06/08/22 1633, Temp:97.9 °F (36.6 °C), Temp src:Temporal  06/08/22 1600, Pulse:(!) 110, Resp:20  06/08/22 1400, BP:(!) 154/90, Pulse:94, Resp:15  06/08/22 1300, BP:(!) 144/88, Pulse:98, Resp:16  06/08/22 1246, BP:(!) 149/87, Temp:97.9 °F (36.6 °C), Temp src:Oral, Pulse:91, Resp:22, SpO2:98 %  06/08/22 0700, BP:(!) 185/100, Pulse:69, Resp:10, SpO2:98 %  06/08/22 0630, BP:(!) 170/105, Pulse:81, Resp:20     LABS:    CBC  Lab Results       Component                Value               Date/Time                  WBC                      8.4                 06/08/2022 04:14 AM        HGB                      14.2                06/08/2022 04:14 AM        HCT                      41.6                06/08/2022 04:14 AM        PLT                      220                 06/08/2022 04:14 AM   RENAL  Lab Results       Component                Value               Date/Time                  NA                       137                 06/08/2022 04:14 AM        K                        4.1                 06/08/2022 04:14 AM        CL                       106                 06/08/2022 04:14 AM        CO2                      23                  06/08/2022 04:14 AM        BUN 14                  06/08/2022 04:14 AM        CREATININE               0.7 (L)             06/08/2022 04:14 AM        GLUCOSE                  101 (H)             06/08/2022 04:14 AM   COAGS  No results found for: PROTIME, INR, APTT    Intake & Output:  @44EUHL@    Nausea & Vomiting:  No    Level of Consciousness:  Awake    Pain Assessment:  Adequate analgesia    Anesthesia Complications:  No apparent anesthetic complications    SUMMARY      Vital signs stable  OK to discharge from Stage I post anesthesia care.   Care transferred from Anesthesiology department on discharge from perioperative area

## 2022-06-08 NOTE — PROGRESS NOTES
Patient HR dropped below 45, shock delivered via [employer], patient vitals stable. Paged night hospitalist and on call cardiology, no changes at this time.

## 2022-06-08 NOTE — CONSULTS
Electrophysiology Consultation   Date: 6/7/2022  Admit Date:  6/6/2022  Reason for Consultation: Symptomatic complete heart block. Consult Requesting Physician: Emelyn Chamberlain MD     Chief Complaint   Patient presents with    Other     has holter monitor on and was told to come to ER maryannue he was having pauses (7 sec)     HPI:  Mr. Mela Mg is a pleasant 60year old male with a medical history significant for syncope, patent foramen ovale with right to left shunt, and malignant neoplasm of his soft palate who presents from home with symptomatic transient complete heart block. Patient was really evaluated with my partner Dr. Melonie Hill on 05/31/2022 with recurrent episodes of syncope. He had second-degree type II sinoatrial exit block with rates in the 40s that were asymptomatic. Dr. Melonie Hill saw him. Patient was discharged home with a cardiac monitor and unfortunately began to have symptomatic complete pleat heart block episodes. He was advised to come to emergency room for further evaluation care. Of note, patient's symptoms seem to center around whenever he has some analgesics for his toe pain. Patient denies fevers, chest pain, orthopnea, PND, lower extremity edema, abdominal swelling, shortness of breath, dyspnea on exertion, chills, visual changes, headaches, abdominal pain, nausea, vomiting, bleeding, bruising, dysuria, muscle/joint pain, confusion, depression, anxiety, skin lesions, etc.    Emergency Room/Hospital Course:  Patient was evaluated emergency room on 06/06/2022. His CMP was reassuring. His CBC was reassuring. His EKG showed normal sinus rhythm. Electrophysiology was consulted to evaluate patient. History reviewed. No pertinent past medical history. History reviewed. No pertinent surgical history.     Allergies   Allergen Reactions    Erythromycin Base      uncoated    Tetanus Toxoid      last injection was at age 13, got very high fever       Social History:  Reviewed. reports that he has been smoking cigarettes. He has been smoking about 0.50 packs per day. He has never used smokeless tobacco. He reports current alcohol use. He reports that he does not use drugs. Family History:  Reviewed. family history is not on file. No premature CAD. Review of System:  All other systems reviewed except for that noted above. Pertinent negatives and positives are:     · General: negative for fever, chills   · Ophthalmic ROS: negative for - eye pain or loss of vision  · ENT ROS: Throat pain  · Respiratory: negative for - cough, sputum  · Cardiovascular: Reviewed in HPI  · Gastrointestinal: negative for - abdominal pain, diarrhea, N/V  · Hematology: negative for - bleeding, blood clots, bruising or jaundice  · Genito-Urinary:  negative for - Dysuria or incontinence  · Musculoskeletal: negative for - Joint swelling, muscle pain  · Neurological: negative for - confusion, dizziness, headaches   · Psychiatric: No anxiety, no depression. · Dermatological: negative for - rash    Physical Examination:  Vitals:    22 2100   BP: (!) 140/77   Pulse: 68   Resp: 13   Temp:    SpO2:         Intake/Output Summary (Last 24 hours) at 2022  Last data filed at 2022 1400  Gross per 24 hour   Intake 1020 ml   Output 875 ml   Net 145 ml     In: 1020 [P.O.:720; I.V.:300]  Out: 875    Wt Readings from Last 3 Encounters:   22 181 lb 14.1 oz (82.5 kg)   22 185 lb 13.6 oz (84.3 kg)   22 180 lb (81.6 kg)     Temp  Av.4 °F (36.9 °C)  Min: 98.1 °F (36.7 °C)  Max: 98.6 °F (37 °C)  Pulse  Av.4  Min: 49  Max: 103  BP  Min: 106/64  Max: 180/99  SpO2  Av %  Min: 93 %  Max: 99 %    · Telemetry: Sinus rhythm, transient complete heart block with rates in the 40s. · Constitutional: Alert. Oriented to person, place, and time. No distress. · Head: Normocephalic and atraumatic.    · Mouth/Throat: Lips appear moist. Oropharynx is clear and moist.  · Eyes: Conjunctivae normal. EOM are normal.   · Neck: Neck supple. No lymphadenopathy. No rigidity. No JVD present. · Cardiovascular: Normal rate, regular rhythm. Normal S1&S2. · Pulmonary/Chest: Bilateral respiratory sounds present. No respiratory accessory muscle use. No wheezes, No rhonchi. · Abdominal: Soft. Normal bowel sounds present. No distension, No tenderness. No splenomegaly. No hernia. · Neurological: Alert and oriented. Cranial nerve II-XII grossly intact. · Skin: Skin is warm and dry. No rash, lesions, ulcerations noted. · Psychiatric: No anxiety nor agitation. Labs:  Reviewed. Recent Labs     06/06/22 2221 06/07/22 0413    137   K 4.8 3.9    102   CO2 27 25   BUN 18 15   CREATININE 0.7* 0.6*     Recent Labs     06/06/22 2221 06/07/22 0413   WBC 11.8* 8.4   HGB 15.8 15.0   HCT 46.7 44.4   MCV 89.5 89.7    264     Lab Results   Component Value Date    CKTOTAL 55 05/31/2022    TROPONINI <0.01 06/06/2022     No results found for: BNP  No results found for: PROTIME, INR  Lab Results   Component Value Date    CHOL 182 05/31/2022    HDL 30 05/31/2022    TRIG 113 05/31/2022       Diagnostic and imaging results reviewed. ECG: Normal sinus rhythm. Echo: 05/31/2022   Summary   Normal left ventricle systolic function with an estimated ejection fraction   of 55%. Septal wall asymmetrical left ventricular hypertrophy is present. No regional wall motion abnormalities are seen. Normal left ventricular diastolic filling pressures. The right ventricle is normal in size and function. Aortic valve appears sclerotic but opens adequately. Mild mitral annular calcification is present. Mild aortic valve regurgitation. Trace tricuspid valve regurgitation.    Systolic pulmonary artery pressure (sPAP) is normal and estimated at 27 mmHg   (right atrial pressure 8 mmHg)   A bubble study was performed and shows evidence of right to left shunting   consistent with a patent foramen ovale or atrial septal defect. Pre-mature beats throughout the study. Cath: None. I independently reviewed the ECG and telemetry. Scheduled Meds:   sodium chloride flush  10 mL IntraVENous 2 times per day    mupirocin   Nasal BID     Continuous Infusions:   sodium chloride      sodium chloride 75 mL/hr at 06/07/22 2000    DOBUTamine      DOBUTamine 5 mcg/kg/min (06/07/22 2000)     PRN Meds:.sodium chloride flush, sodium chloride, ondansetron, polyethylene glycol, acetaminophen **OR** acetaminophen, ketorolac, oxyCODONE-acetaminophen     Assessment:   Patient Active Problem List    Diagnosis Date Noted    Sinus pause 06/06/2022    Patent foramen ovale with right to left shunt 06/01/2022    Malignant neoplasm of soft palate ([facility] [address] 06/01/2022    Moderate protein-calorie malnutrition ([facility] [address] 06/01/2022    Symptomatic bradycardia 05/31/2022      Active Hospital Problems    Diagnosis Date Noted    Sinus pause [I45.5] 06/06/2022     Priority: Medium    Patent foramen ovale with right to left shunt [Q21.1] 06/01/2022     Priority: Medium     Mr. Bradley Fernández is a pleasant 60year old male with a medical history significant for syncope, patent foramen ovale with right to left shunt, and malignant neoplasm of his soft palate who presents from home with symptomatic transient complete heart block. Problem List:  1. Transient symptomatic complete heart block. 2. Sick sinus syndrome. 3. Malignant neoplasm of soft palate. Assessment and Plan:  1. Transient symptomatic complete heart block. Patient is a pleasant 49-year-old male with a medical history significant for neoplasm of his soft palate with work-up in progress, and recurrent syncope who presents from home with symptomatic complete heart block. No referral causes found. Patient on no inderjit agents. His echocardiogram is reassuring. No signs of infection such as tick bite. Patient qualifies for a dual-chamber pacemaker. We will plan on placing a pacemaker tomorrow morning. All questions are addressed. - DC pacemaker tomorrow morning.  - Continue dobutamine drip.  - No MRI until 6 weeks post pacemaker implantation.    - If bradycardic and or symptomatic with pauses please consider the following:   Isuprel: 2-10 mcg/min   Dopamine: 5-10mcg/kg/min   Epinephrine: 2-10mcg/min   Dobutamine: 2-10 mcg/kg/min    2. Sick sinus syndrome. Patient with a history of recurrent syncope, atrial exit block type II.  - Plan as per above. 3. Malignant neoplasm of soft palate. Stable. - Work up per oncology. Thank you for allowing me to participate in the care of Nikolas Wilson . If you have any questions/comments, please do not hesitate to contact us.     Junior Kenroy MD  Cardiac Electrophysiology  [address] [facility]  [phone] [facility]

## 2022-06-09 ENCOUNTER — NURSE ONLY (OUTPATIENT)
Dept: CARDIOLOGY CLINIC | Age: 57
End: 2022-06-09
Payer: COMMERCIAL

## 2022-06-09 ENCOUNTER — ANESTHESIA (OUTPATIENT)
Dept: CARDIAC CATH/INVASIVE PROCEDURES | Age: 57
DRG: 243 | End: 2022-06-09
Payer: COMMERCIAL

## 2022-06-09 ENCOUNTER — APPOINTMENT (OUTPATIENT)
Dept: CARDIAC CATH/INVASIVE PROCEDURES | Age: 57
DRG: 243 | End: 2022-06-09
Payer: COMMERCIAL

## 2022-06-09 ENCOUNTER — ANESTHESIA EVENT (OUTPATIENT)
Dept: CARDIAC CATH/INVASIVE PROCEDURES | Age: 57
DRG: 243 | End: 2022-06-09
Payer: COMMERCIAL

## 2022-06-09 VITALS
BODY MASS INDEX: 23.7 KG/M2 | TEMPERATURE: 98 F | DIASTOLIC BLOOD PRESSURE: 92 MMHG | RESPIRATION RATE: 18 BRPM | SYSTOLIC BLOOD PRESSURE: 164 MMHG | OXYGEN SATURATION: 99 % | HEART RATE: 99 BPM | HEIGHT: 72 IN | WEIGHT: 175 LBS

## 2022-06-09 DIAGNOSIS — I45.5 SINUS PAUSE: ICD-10-CM

## 2022-06-09 DIAGNOSIS — Z95.0 PACEMAKER: ICD-10-CM

## 2022-06-09 DIAGNOSIS — R00.1 SYMPTOMATIC BRADYCARDIA: ICD-10-CM

## 2022-06-09 LAB
ANION GAP SERPL CALCULATED.3IONS-SCNC: 10 MMOL/L (ref 3–16)
BASOPHILS ABSOLUTE: 0 K/UL (ref 0–0.2)
BASOPHILS RELATIVE PERCENT: 0.5 %
BUN BLDV-MCNC: 14 MG/DL (ref 7–20)
CALCIUM SERPL-MCNC: 8.8 MG/DL (ref 8.3–10.6)
CHLORIDE BLD-SCNC: 103 MMOL/L (ref 99–110)
CO2: 25 MMOL/L (ref 21–32)
CREAT SERPL-MCNC: 0.6 MG/DL (ref 0.9–1.3)
EKG ATRIAL RATE: 64 BPM
EKG DIAGNOSIS: NORMAL
EKG P AXIS: 32 DEGREES
EKG P-R INTERVAL: 158 MS
EKG Q-T INTERVAL: 370 MS
EKG QRS DURATION: 92 MS
EKG QTC CALCULATION (BAZETT): 381 MS
EKG R AXIS: 50 DEGREES
EKG T AXIS: 50 DEGREES
EKG VENTRICULAR RATE: 64 BPM
EOSINOPHILS ABSOLUTE: 0.1 K/UL (ref 0–0.6)
EOSINOPHILS RELATIVE PERCENT: 1.4 %
GFR AFRICAN AMERICAN: >60
GFR NON-AFRICAN AMERICAN: >60
GLUCOSE BLD-MCNC: 118 MG/DL (ref 70–99)
HCT VFR BLD CALC: 43.7 % (ref 40.5–52.5)
HEMOGLOBIN: 15 G/DL (ref 13.5–17.5)
LYMPHOCYTES ABSOLUTE: 2.2 K/UL (ref 1–5.1)
LYMPHOCYTES RELATIVE PERCENT: 22.4 %
MCH RBC QN AUTO: 30.9 PG (ref 26–34)
MCHC RBC AUTO-ENTMCNC: 34.2 G/DL (ref 31–36)
MCV RBC AUTO: 90.4 FL (ref 80–100)
MONOCYTES ABSOLUTE: 0.8 K/UL (ref 0–1.3)
MONOCYTES RELATIVE PERCENT: 8.1 %
NEUTROPHILS ABSOLUTE: 6.7 K/UL (ref 1.7–7.7)
NEUTROPHILS RELATIVE PERCENT: 67.6 %
PDW BLD-RTO: 13.6 % (ref 12.4–15.4)
PLATELET # BLD: 213 K/UL (ref 135–450)
PMV BLD AUTO: 7.2 FL (ref 5–10.5)
POTASSIUM REFLEX MAGNESIUM: 4.3 MMOL/L (ref 3.5–5.1)
RBC # BLD: 4.84 M/UL (ref 4.2–5.9)
SODIUM BLD-SCNC: 138 MMOL/L (ref 136–145)
WBC # BLD: 9.9 K/UL (ref 4–11)

## 2022-06-09 PROCEDURE — 33215 REPOSITION PACING-DEFIB LEAD: CPT

## 2022-06-09 PROCEDURE — 6360000002 HC RX W HCPCS

## 2022-06-09 PROCEDURE — 6370000000 HC RX 637 (ALT 250 FOR IP): Performed by: INTERNAL MEDICINE

## 2022-06-09 PROCEDURE — 2580000003 HC RX 258: Performed by: INTERNAL MEDICINE

## 2022-06-09 PROCEDURE — 2500000003 HC RX 250 WO HCPCS

## 2022-06-09 PROCEDURE — 3700000000 HC ANESTHESIA ATTENDED CARE

## 2022-06-09 PROCEDURE — 6360000002 HC RX W HCPCS: Performed by: NURSE ANESTHETIST, CERTIFIED REGISTERED

## 2022-06-09 PROCEDURE — 2720000010 HC SURG SUPPLY STERILE

## 2022-06-09 PROCEDURE — 93010 ELECTROCARDIOGRAM REPORT: CPT | Performed by: INTERNAL MEDICINE

## 2022-06-09 PROCEDURE — 36415 COLL VENOUS BLD VENIPUNCTURE: CPT

## 2022-06-09 PROCEDURE — 99233 SBSQ HOSP IP/OBS HIGH 50: CPT | Performed by: INTERNAL MEDICINE

## 2022-06-09 PROCEDURE — 93280 PM DEVICE PROGR EVAL DUAL: CPT | Performed by: INTERNAL MEDICINE

## 2022-06-09 PROCEDURE — 80048 BASIC METABOLIC PNL TOTAL CA: CPT

## 2022-06-09 PROCEDURE — 99291 CRITICAL CARE FIRST HOUR: CPT | Performed by: INTERNAL MEDICINE

## 2022-06-09 PROCEDURE — 3700000001 HC ADD 15 MINUTES (ANESTHESIA)

## 2022-06-09 PROCEDURE — 85025 COMPLETE CBC W/AUTO DIFF WBC: CPT

## 2022-06-09 PROCEDURE — 33215 REPOSITION PACING-DEFIB LEAD: CPT | Performed by: INTERNAL MEDICINE

## 2022-06-09 RX ORDER — MIDAZOLAM HYDROCHLORIDE 1 MG/ML
INJECTION INTRAMUSCULAR; INTRAVENOUS PRN
Status: DISCONTINUED | OUTPATIENT
Start: 2022-06-09 | End: 2022-06-09 | Stop reason: SDUPTHER

## 2022-06-09 RX ADMIN — Medication 10 ML: at 07:44

## 2022-06-09 RX ADMIN — DOXYCYCLINE HYCLATE 100 MG: 100 TABLET, COATED ORAL at 09:30

## 2022-06-09 RX ADMIN — SODIUM CHLORIDE: 9 INJECTION, SOLUTION INTRAVENOUS at 07:44

## 2022-06-09 RX ADMIN — MIDAZOLAM HYDROCHLORIDE 1 MG: 2 INJECTION, SOLUTION INTRAMUSCULAR; INTRAVENOUS at 14:45

## 2022-06-09 RX ADMIN — MIDAZOLAM HYDROCHLORIDE 1 MG: 2 INJECTION, SOLUTION INTRAMUSCULAR; INTRAVENOUS at 14:42

## 2022-06-09 ASSESSMENT — PAIN SCALES - GENERAL
PAINLEVEL_OUTOF10: 0
PAINLEVEL_OUTOF10: 0
PAINLEVEL_OUTOF10: 4

## 2022-06-09 ASSESSMENT — PAIN DESCRIPTION - LOCATION: LOCATION: CHEST

## 2022-06-09 NOTE — ANESTHESIA POSTPROCEDURE EVALUATION
Department of Anesthesiology  Postprocedure Note    Patient: Leno White  MRN: [id number]  YOB: 1965  Date of evaluation: 6/9/2022  Time:  4:43 PM     Procedure Summary     Date: 06/09/22 Room / Location: [address] [place] [address] Cardiac Cath Lab    Anesthesia Start: 1440 Anesthesia Stop: 1984    Procedure: ELECTROPHYSIOLOGY Diagnosis:     Scheduled Providers:  Responsible Provider: Dani Ley MD    Anesthesia Type: MAC ASA Status: 3          Anesthesia Type: No value filed. Mauro Phase I: Mauro Score: 9    Mauro Phase II:      Last vitals: Reviewed and per EMR flowsheets.        Anesthesia Post Evaluation    Comments: Postoperative Anesthesia Note    Name:    Leno White  MRN:      [id number]    Patient Vitals in the past 12 hrs:  06/09/22 1600, BP:(!) 171/105, Pulse:97, Resp:11, SpO2:96 %  06/09/22 1542, BP:(!) 165/101, Temp:98.2 °F (36.8 °C), Temp src:Oral, Pulse:(!) 106, Resp:19, SpO2:95 %  06/09/22 1336, BP:(!) 190/102, Pulse:96, Resp:29, SpO2:97 %  06/09/22 1300, BP:(!) 161/93, Pulse:99, Resp:14, SpO2:96 %  06/09/22 1246, Temp:98.3 °F (36.8 °C), Temp src:Oral  06/09/22 1200, BP:(!) 161/93, Pulse:74, Resp:12, SpO2:96 %  06/09/22 1100, BP:(!) 167/103, Pulse:69, Resp:10, SpO2:98 %  06/09/22 1027, Pulse:95, Resp:16, SpO2:(!) 69 %  06/09/22 1000, BP:(!) 156/96, Pulse:71, Resp:11, SpO2:96 %  06/09/22 0900, BP:(!) 165/91, Pulse:66, Resp:13, SpO2:97 %  06/09/22 0800, BP:(!) 184/105, Pulse:62, Resp:12, SpO2:97 %  06/09/22 0740, Temp:98.4 °F (36.9 °C), Temp src:Oral, Pulse:63, Resp:12, SpO2:99 %  06/09/22 0700, BP:(!) 170/94, Pulse:80, Resp:15, SpO2:97 %  06/09/22 0600, BP:(!) 163/92, Pulse:69, Resp:15, SpO2:96 %, Weight:175 lb (79.4 kg)  06/09/22 0500, BP:(!) 162/88, Pulse:60, Resp:13, SpO2:96 %     LABS:    CBC  Lab Results       Component                Value               Date/Time                  WBC                      9.9                 06/09/2022 04:28 AM        HGB                      15.0 06/09/2022 04:28 AM        HCT                      43.7                06/09/2022 04:28 AM        PLT                      213                 06/09/2022 04:28 AM   RENAL  Lab Results       Component                Value               Date/Time                  NA                       138                 06/09/2022 04:28 AM        K                        4.3                 06/09/2022 04:28 AM        CL                       103                 06/09/2022 04:28 AM        CO2                      25                  06/09/2022 04:28 AM        BUN                      14                  06/09/2022 04:28 AM        CREATININE               0.6 (L)             06/09/2022 04:28 AM        GLUCOSE                  118 (H)             06/09/2022 04:28 AM   COAGS  No results found for: PROTIME, INR, APTT    Intake & Output:  In: 240 (P.O.:240)  Out: 1925 (Urine:1925)    Nausea & Vomiting:  No    Level of Consciousness:  Awake    Pain Assessment:  Adequate analgesia    Anesthesia Complications:  No apparent anesthetic complications    SUMMARY      Vital signs stable  OK to discharge from Stage I post anesthesia care.   Care transferred from Anesthesiology department on discharge from perioperative area

## 2022-06-09 NOTE — DISCHARGE SUMMARY
Hospital Medicine Discharge Summary    Patient ID: Janet Mckeon      Patient's PCP: Isabel Ferrell DO    Admit Date: 6/6/2022     Discharge Date: 6/9/2022      Admitting Provider: Madisyn Edwards MD     Discharge Provider: Lubna Mcgraw MD     Discharge Diagnoses: Active Hospital Problems    Diagnosis     Sinus pause [I45.5]      Priority: Medium    Patent foramen ovale with right to left shunt [Q21.1]      Priority: Medium       The patient was seen and examined on day of discharge and this discharge summary is in conjunction with any daily progress note from day of discharge. Hospital Course:   HISTORY OF PRESENT ILLNESS: Pt is an 60y.o. year-old male with a history of a malignant neoplasm of the soft palate, a patent foramen ovale with right to left shunt and symptomatic bradycardia. He has been wearing a Holter monitor and had several episodes of sinus pauses today causing presyncopal symptoms. He had several episodes of loss of consciousness over the past month, prompting placement of a Holter monitor at the end of his recent hospital admission. He spoke with cardiology who advised him to come to the emergency room for admission. They plan to place a pacemaker. He is being admitted for further evaluation and treatment. Associated signs and symptoms do not include chest pain, shortness of breath, diaphoresis, edema, orthopnea, paroxysmal nocturnal dyspnea, fever or chills.  No recent medication changes.           Sinus pauses noted on Holter monitor - patient with several episodes of syncope in the last month prompting placement of Holter monitor. Osiel Dwyer has noted to have several incidents of sinus pauses up to 7 seconds with presyncopal symptoms.  Cardiology directed him to come to the emergency room for admission with plans for placement of a cardiac pacemaker.  Pt was monitored in the ICU pending cardiology evaluation.   -pt required dobutamine ggt during stay  -Pacer placed 6/8SARWAT lead revision required 6/9     Patent foramen ovale with right to left shunt- per Cardiology     Oropharyngeal mass- ongoing concern for neoplasm  -mgmt per outpt ent   - oncology consulted, apprec recs    Physical Exam Performed:     BP (!) 164/92   Pulse 99   Temp 98 °F (36.7 °C) (Oral)   Resp 18   Ht 6' (1.829 m)   Wt 175 lb (79.4 kg)   SpO2 99%   BMI 23.73 kg/m²       General appearance:  No apparent distress, appears stated age and cooperative. HEENT:  Normal cephalic, atraumatic without obvious deformity. Pupils equal, round, and reactive to light. Extra ocular muscles intact. Conjunctivae/corneas clear. Neck: Supple, with full range of motion. No jugular venous distention. Trachea midline. Respiratory:  Normal respiratory effort. Clear to auscultation, bilaterally without Rales/Wheezes/Rhonchi. Cardiovascular:  Regular rate and rhythm with normal S1/S2 without murmurs, rubs or gallops. Abdomen: Soft, non-tender, non-distended with normal bowel sounds. Musculoskeletal:  No clubbing, cyanosis or edema bilaterally. Full range of motion without deformity. Skin: Skin color, texture, turgor normal.  No rashes or lesions. Neurologic:  Neurovascularly intact without any focal sensory/motor deficits. Cranial nerves: II-XII intact, grossly non-focal.  Psychiatric:  Alert and oriented, thought content appropriate, normal insight  Capillary Refill: Brisk,< 3 seconds   Peripheral Pulses: +2 palpable, equal bilaterally       Labs:  For convenience and continuity at follow-up the following most recent labs are provided:      CBC:    Lab Results   Component Value Date    WBC 9.9 06/09/2022    HGB 15.0 06/09/2022    HCT 43.7 06/09/2022     06/09/2022       Renal:    Lab Results   Component Value Date     06/09/2022    K 4.3 06/09/2022     06/09/2022    CO2 25 06/09/2022    BUN 14 06/09/2022    CREATININE 0.6 06/09/2022    CALCIUM 8.8 06/09/2022    PHOS 4.3 05/30/2022         Significant Diagnostic Studies    Radiology:   XR CHEST (2 VW)   Final Result   Status post left ICD placement without evidence of pneumothorax. Consults:     IP CONSULT TO HOSPITALIST  IP CONSULT TO CARDIOLOGY  IP CONSULT TO HEM/ONC    Disposition:  home     Condition at Discharge: Stable    Discharge Instructions/Follow-up:  EP and oncology as outpt, ENT as outpt    Code Status:  FULL    Activity: activity as tolerated    Diet: regular diet      Discharge Medications:     Discharge Medication List as of 6/9/2022  6:59 PM           Details   doxycycline hyclate (VIBRA-TABS) 100 MG tablet Take 1 tablet by mouth every 12 hours for 5 days, Disp-10 tablet, R-0Normal             Time Spent on discharge is more than 30 minutes in the examination, evaluation, counseling and review of medications and discharge plan. Signed:    Calderon Bauman MD   6/10/2022      Thank you Caridad Zabala DO for the opportunity to be involved in this patient's care. If you have any questions or concerns, please feel free to contact me at [phone].

## 2022-06-09 NOTE — CONSULTS
[facility]    Consult Note      Requesting Physician:  Dr. Angie Powell:  Head and neck cancer        HISTORY OF PRESENT ILLNESS:      Mr. 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 I initially saw him in the hospital on 6/1/2022, but pathology was pending at that time. He is admitted for presyncope. He has a patent foramen ovale with shunt from right to left with symptomatic bradycardia. Cardiology plans to place a pacemaker. Past Medical History:    History reviewed. No pertinent past medical history. Past Surgical History:    History reviewed. No pertinent surgical history.     Current Medications:    Current Facility-Administered Medications   Medication Dose Route Frequency Provider Last Rate Last Admin    sodium chloride flush 0.9 % injection 5-40 mL  5-40 mL IntraVENous 2 times per day Steve Magallon MD   10 mL at 06/09/22 0744    sodium chloride flush 0.9 % injection 5-40 mL  5-40 mL IntraVENous PRN Steve Magallon MD        0.9 % sodium chloride infusion   IntraVENous PRN NISHA Ford MD        doxycycline hyclate (VIBRA-TABS) tablet 100 mg  100 mg Oral 2 times per day Steve Magallon MD   100 mg at 06/09/22 0930    0.9 % sodium chloride infusion   IntraVENous PRN Julio Saul MD        ondansetron [facility]) injection 4 mg  4 mg IntraVENous Q4H PRN Julio Saul MD        polyethylene glycol [facility]) packet 17 g  17 g Oral Daily PRN Julio Saul MD        acetaminophen (TYLENOL) tablet 650 mg  650 mg Oral Q4H PRN Julio Saul MD        Or    acetaminophen (TYLENOL) suppository 650 mg  650 mg Rectal Q4H PRN Julio Saul MD        0.9 % sodium chloride infusion   IntraVENous Continuous [place] Luke Josie Thomas MD 75 mL/hr at 06/09/22 1440 NoRateChange at 06/09/22 1440    mupirocin (BACTROBAN) 2 % ointment   Nasal BID Luis Fernando Cali MD   Given at 06/08/22 2049    ketorolac (TORADOL) injection 15 mg  15 mg IntraVENous Q6H PRN Luis Fernando Cali MD   15 mg at 06/07/22 1308    oxyCODONE-acetaminophen (PERCOCET) 5-325 MG per tablet 1 tablet  1 tablet Oral Q6H PRN Luis Fernando Cali MD        DOBUTamine (DOBUTREX) 500 mg in dextrose 5 % 250 mL infusion  2.5-10 mcg/kg/min IntraVENous Continuous Melva Layo, APRN - CNP   Stopped at 06/08/22 0403     Allergies: Allergies   Allergen Reactions    Erythromycin Base      uncoated    Tetanus Toxoid      last injection was at age 13, got very high fever       Social History:   Social History     Socioeconomic History    Marital status:      Spouse name: Not on file    Number of children: Not on file    Years of education: Not on file    Highest education level: Not on file   Occupational History    Not on file   Tobacco Use    Smoking status: Current Every Day Smoker     Packs/day: 0.50     Types: Cigarettes    Smokeless tobacco: Never Used   Vaping Use    Vaping Use: Never used   Substance and Sexual Activity    Alcohol use: Yes     Comment: 2 drinks a week    Drug use: Never    Sexual activity: Not on file   Other Topics Concern    Not on file   Social History Narrative    Not on file     Social Determinants of Health     Financial Resource Strain:     Difficulty of Paying Living Expenses: Not on file   Food Insecurity:     Worried About Running Out of Food in the Last Year: Not on file    Isabel of Food in the Last Year: Not on file   Transportation Needs:     Lack of Transportation (Medical): Not on file    Lack of Transportation (Non-Medical):  Not on file   Physical Activity:     Days of Exercise per Week: Not on file    Minutes of Exercise per Session: Not on file   Stress:     Feeling of Stress : Not on file   Social Connections:     Frequency of Communication with Friends and Family: Not on file    Frequency of Social Gatherings with Friends and Family: Not on file    Attends Confucianism Services: Not on file    Active Member of Clubs or Organizations: Not on file    Attends Club or Organization Meetings: Not on file    Marital Status: Not on file   Intimate Partner Violence:     Fear of Current or Ex-Partner: Not on file    Emotionally Abused: Not on file    Physically Abused: Not on file    Sexually Abused: Not on file   Housing Stability:     Unable to Pay for Housing in the Last Year: Not on file    Number of [place] in the Last Year: Not on file    Unstable Housing in the Last Year: Not on file          Family History:     History reviewed. No pertinent family history. REVIEW OF SYSTEMS:      Constitutional:  No weight loss, No fever, No chills, No night sweats. Energy level good. Eyes:  No impairment or change in vision  ENT / Mouth:  See above and below.   Cardiovascular:  No chest pain, palpitations, new edema, or calf discomfort  Respiratory:  No pain, hemoptysis, change to breathing  Breast:  No pain, discharge, change in appearance or texture  Gastrointestinal:  No pain, cramping, jaundice, change to eating and bowel habits  Urinary:  No pain, bleeding or change in continence  Genitalia: No pain, bleeding or discharge  Musculoskeletal:  No redness, pain, edema or weakness  Skin:  No pruritus, rash, change to nodules or lesions  Neurologic:  No discomfort, change in mental status, speech, sensory or motor activity  Psychiatric:  No change in concentration or change to affect or mood  Endocrine:  No hot flashes, increased thirst, or change to urine production  Hematologic: No petechiae, ecchymosis or bleeding  Lymphatic:  No lymphadenopathy or lymphedema  Allergy / Immunologic:  No eczema, hives, frequent or recurrent infections    PHYSICAL EXAM:      Vitals:  BP (!) 171/105   Pulse 97   Temp 98.2 °F (36.8 °C) (Oral)   Resp 11   Ht 6' (1.829 m)   Wt 175 lb (79.4 kg)   SpO2 96%   BMI 23.73 kg/m²   CONSTITUTIONAL: awake, alert, cooperative, no apparent distress   EYES: pupils equal, round and reactive to light, sclera clear and conjunctiva normal  ENT: Normocephalic, without obvious abnormality, atraumatic  NECK: supple, symmetrical, no jugular venous distension and no carotid bruits   HEMATOLOGIC/LYMPHATIC: no cervical, supraclavicular or axillary lymphadenopathy   LUNGS: no increased work of breathing and clear to auscultation   CARDIOVASCULAR: regular rate and rhythm, normal S1 and S2, no murmur noted  ABDOMEN: normal bowel sounds x 4, soft, non-distended, non-tender, no masses palpated, no hepatosplenomgaly   MUSCULOSKELETAL: full range of motion noted, tone is normal  NEUROLOGIC: awake, alert, oriented to name, place and time. Motor skills grossly intact. SKIN: Normal skin color, texture, turgor and no jaundice. appears intact   EXTREMITIES: no LE edema       DATA:    PT/INR:    Recent Labs     06/06/22  2221   PROT 6.8     PTT:  No results for input(s): APTT in the last 72 hours.   CMP:    Lab Results   Component Value Date     06/09/2022    K 4.3 06/09/2022     06/09/2022    CO2 25 06/09/2022    BUN 14 06/09/2022    PROT 6.8 06/06/2022     :    Lab Results   Component Value Date    MG 2.40 06/06/2022     Phosphorus:  No components found for: PO4  Calcium:  No results found for: CA  CBC:    Lab Results   Component Value Date    WBC 9.9 06/09/2022    RBC 4.84 06/09/2022    HGB 15.0 06/09/2022    HCT 43.7 06/09/2022    MCV 90.4 06/09/2022    RDW 13.6 06/09/2022     06/09/2022     DIFF:  Lab Results   Component Value Date    MCV 90.4 06/09/2022    RDW 13.6 06/09/2022      [email or web address]  Uric Acid:  @labcrnt(URIC)@    Radiology Review:         Problem List  Patient Active Problem List   Diagnosis    Symptomatic bradycardia    Patent foramen ovale with right to left shunt    Malignant neoplasm of soft palate (HCC)    Moderate protein-calorie malnutrition (HCC)    Sinus pause    Pacemaker       IMPRESSION/RECOMMENDATIONS:    Head neck carcinoma:  -Right oropharyngeal mass  -Diagnosis 5/27/2022  -HPV positive  -Clinically this appears to be a T3, N2 lesion with bilateral adenopathy  -This typically indicates concurrent systemic chemotherapy/radiation or  -resection of primary and bilateral neck dissection if feasible   or   -induction chemotherapy followed by radiation therapy or concurrent chemotherapy and radiation  -He needs a PET scan as an outpatient  -He will likely need a Port-A-Cath if he elects to not have surgery    Syncope:  -Pacemaker planned per cardiology      Thank you for asking me to see the patient.        nAnie Ndiaye MD  Please Contact Through [employer]

## 2022-06-09 NOTE — PROGRESS NOTES
Electrophysiology Progress Note     Admit Date: 2022     Reason for follow up: Transient complete heart block. Interval History:   - Patient seen and examined. - Clinical notes reviewed. - Telemetry reviewed. - No new complaints today. - RA lead dislodged. - Recurrent syncope. Physical Examination:  Vitals:    22 1700   BP: (!) 162/106   Pulse: 93   Resp: 22   Temp:    SpO2: 97%        Intake/Output Summary (Last 24 hours) at 2022 1758  Last data filed at 2022 1542  Gross per 24 hour   Intake --   Output 1525 ml   Net -1525 ml     In: 240 [P.O.:240]  Out: 1925    Wt Readings from Last 3 Encounters:   22 175 lb (79.4 kg)   22 185 lb 13.6 oz (84.3 kg)   22 180 lb (81.6 kg)     Temp  Av.2 °F (36.8 °C)  Min: 98 °F (36.7 °C)  Max: 98.4 °F (36.9 °C)  Pulse  Av.8  Min: 60  Max: 106  BP  Min: 111/81  Max: 190/102  SpO2  Av.5 %  Min: 69 %  Max: 99 %    · Telemetry: Sinus rhythm with transient complete heart block. RA lead clearly not capturing and in T wave. · Constitutional: Alert. Oriented to person, place, and time. No distress. · Head: Normocephalic and atraumatic. · Mouth/Throat: Lips appear moist. Oropharynx is clear and moist.  · Eyes: Conjunctivae normal. EOM are normal.   · Neck: Neck supple. No lymphadenopathy. No rigidity. No JVD present. · Cardiovascular: Normal rate, regular rhythm. Normal S1&S2. · Pulmonary/Chest: Bilateral respiratory sounds present. No respiratory accessory muscle use. · Abdominal: Soft. Normal bowel sounds present. No distension, No tenderness. No splenomegaly. No hernia. · Musculoskeletal: No tenderness. No edema    · Neurological: Alert and oriented. Cranial nerve II-XII grossly intact. · Skin: Skin is warm and dry. No rash, lesions, ulcerations noted. · Psychiatric: No anxiety nor agitation. Labs, diagnostic and imaging results reviewed. Reviewed.    Recent Labs     22  0413 22  0414 Transient symptomatic complete heart block. 2. Sick sinus syndrome. 3. Malignant neoplasm of soft palate.     Assessment and Plan:  1. Transient symptomatic complete heart block. 06/06/2022  Patient is a pleasant 66-year-old male with a medical history significant for neoplasm of his soft palate with work-up in progress, and recurrent syncope who presents from home with symptomatic complete heart block. No referral causes found. Patient on no inderjit agents. His echocardiogram is reassuring. No signs of infection such as tick bite. Patient qualifies for a dual-chamber pacemaker. We will plan on placing a pacemaker tomorrow morning. All questions are addressed. - DC pacemaker tomorrow morning.  - Continue dobutamine drip.  - No MRI until 6 weeks post pacemaker implantation.     - If bradycardic and or symptomatic with pauses please consider the following:              Isuprel: 2-10 mcg/min              Dopamine: 5-10mcg/kg/min              Epinephrine: 2-10mcg/min              Dobutamine: 2-10 mcg/kg/min     06/09/2022  Patient's RA lead dislodge. Plan for revision.  - RA lead revision today. - Plan for DC post RA revision. 2. Sick sinus syndrome. Patient with a history of recurrent syncope, atrial exit block type II.  - Plan as per above.     3. Malignant neoplasm of soft palate. Stable. - Work up per oncology.     Thank you for allowing me to participate in the care of this patient. If you have any questions, please do not hesitate to contact me.     Junior Kenroy MD  Cardiac Electrophysiology  [address] [facility]  [phone] [facility]

## 2022-06-09 NOTE — PROCEDURES
[facility]  Electrophysiology Procedure Note    Attending MD Donny Barone MD    Procedures Right atrial lead revision    Indications   Right atrial lead dislodgement  Complication None  EBL  < 10cc  Conclusions Successful right atrial lead revision    Description of Procedure  The patient was brought to the electrophysiology laboratory in the fasting state after informed consent was obtained. The patient was placed in the supine position and the left pectoral area was prepared and draped in a sterile fashion. Sedation was provided by anesthesia services. The electrocardiogram, blood pressure, and oxygenation were monitored intra- and post-procedure. Intravenous antibiotic was given prior to the procedure for general antibiotic prophylaxis. After using buffered lidocaine 1% with epinephrine (1/100,000) for local anesthesia to the left pectoral area, a 4-5 cm incision was made along the patient's existing scar line with care not to damage the underlying leads. The subcutaneous and scar tissues were dissected and the chronically implanted device was freed from the existing scar tissue and removed from the pocket. She lost chronic RA lead was freed from device header. Stylette was placed in chronic RA lead. RA lead was repositioned. The sensing and pacing threshold was confirmed. The device was placed into the pocket, ensuring the leads were positioned underneath the device, and the wound was flushed with antibacterial solution and hemostasis confirmed (TyRx pouch). The fascia was closed using interrupted 3-0 Vicryl. The subcutaneous tissue and skin were approximated using running 4-0 Vicryl sutures and poly-cyanoacrylate solution was applied to the site and a sterile dressing was applied. The patient tolerated the procedure well and there were no complications. At the conclusion of the procedure, all sponges and sharps were accounted for by two counts.     The attending physician, Merly Tovar Arnaud Valderrama MD was present for the entire procedure and for interpretation of data. Device and Lead Information  Pulse Generator Model # Manfacturer Serial # Location   [id number] Medtronic [id number] left pectoral, subcutaneous     Lead Model Number Manfacturer Serial Number Lead position Implant/Explant   RA [id number] Medtronic [id number] RA Chronic (replaced)   RV [id number] Medtronic [id number] RV Chronic (replace)     Lead Sensing and Thresholds  Lead R/P sensing (mV) Threshold (V) Threshold PW (msec) Impedance (?) Final Voltage (V) Final PW (msec)   RA 1.3 0.5 0.4 475 3.5 0.4   RV 12.4 0.5 0.4 456 3.5 0.4     Bradycardia Settings  Aditya Mode LRL URL PAVD SAVD Mode Switching Mode SW Rate   DDDR 60 130 250 220       Proceduralist Notes:  - Successful pacemaker atrial lead replacement.

## 2022-06-09 NOTE — PROGRESS NOTES
Awakened pt @ shift change which he wasn't happy about. Admits to level 4-5 soreness lt upper chest but refuses pain med @ this time. Dressing over pacer site d&i.

## 2022-06-09 NOTE — ANESTHESIA PRE PROCEDURE
Department of Anesthesiology  Preprocedure Note       Name:  Carlos Mendez   Age:  60 y.o.  :  1965                                          MRN:  [id number]         Date:  2022      Surgeon: * No surgeons listed *    Procedure: * No procedures listed *    Medications prior to admission:   Prior to Admission medications    Medication Sig Start Date End Date Taking?  Authorizing Provider   doxycycline hyclate (VIBRA-TABS) 100 MG tablet Take 1 tablet by mouth every 12 hours for 5 days 22 Yes Raimundo Loomis MD       Current medications:    Current Facility-Administered Medications   Medication Dose Route Frequency Provider Last Rate Last Admin    vancomycin (VANCOCIN) 1,300 mg in sodium chloride 0.9 % 250 mL IVPB  1.3 g IntraVENous Once Valdemar Medley  mL/hr at 22 1420 1,300 mg at 22 1420    sodium chloride flush 0.9 % injection 5-40 mL  5-40 mL IntraVENous 2 times per day Valdemar Medley MD   10 mL at 22 0744    sodium chloride flush 0.9 % injection 5-40 mL  5-40 mL IntraVENous PRN Valdemar Medley MD        0.9 % sodium chloride infusion   IntraVENous PRN Valdemar Medley MD        doxycycline hyclate (VIBRA-TABS) tablet 100 mg  100 mg Oral 2 times per day Valdemar Medley MD   100 mg at 22 0930    0.9 % sodium chloride infusion   IntraVENous PRN Magalie Appiah MD        ondansetron [facility]) injection 4 mg  4 mg IntraVENous Q4H PRN Magalie Appiah MD        polyethylene glycol [facility]) packet 17 g  17 g Oral Daily PRN Magalie Appiah MD        acetaminophen (TYLENOL) tablet 650 mg  650 mg Oral Q4H PRN Magalie Appiah MD        Or    acetaminophen (TYLENOL) suppository 650 mg  650 mg Rectal Q4H PRN Magalie Appiah MD        0.9 % sodium chloride infusion   IntraVENous Continuous Magalie Appiah MD 75 mL/hr at 22 1440 NoRateChange at 22 1440    mupirocin (BACTROBAN) 2 % ointment   Nasal BID [facility] Renetta Zepeda MD   Given at 06/08/22 2049    ketorolac (TORADOL) injection 15 mg  15 mg IntraVENous Q6H PRN Terence Mcgill MD   15 mg at 06/07/22 1308    oxyCODONE-acetaminophen (PERCOCET) 5-325 MG per tablet 1 tablet  1 tablet Oral Q6H PRN Terence Mcgill MD        DOBUTamine (DOBUTREX) 500 mg in dextrose 5 % 250 mL infusion  2.5-10 mcg/kg/min IntraVENous Continuous Mary Hopping, APRN - CNP   Stopped at 06/08/22 0403     Facility-Administered Medications Ordered in Other Encounters   Medication Dose Route Frequency Provider Last Rate Last Admin    midazolam (VERSED) injection   IntraVENous PRN Arin Mahoney MD   1 mg at 06/09/22 1445       Allergies: Allergies   Allergen Reactions    Erythromycin Base      uncoated    Tetanus Toxoid      last injection was at age 13, got very high fever       Problem List:    Patient Active Problem List   Diagnosis Code    Symptomatic bradycardia R00.1    Patent foramen ovale with right to left shunt Q21.1    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 No pertinent past medical history. Past Surgical History:  History reviewed. No pertinent surgical history.     Social History:    Social History     Tobacco Use    Smoking status: Current Every Day Smoker     Packs/day: 0.50     Types: Cigarettes    Smokeless tobacco: Never Used   Substance Use Topics    Alcohol use: Yes     Comment: 2 drinks a week                                Ready to quit: Not Answered  Counseling given: Not Answered      Vital Signs (Current):   Vitals:    06/09/22 1200 06/09/22 1246 06/09/22 1300 06/09/22 1336   BP: (!) 161/93  (!) 161/93 (!) 190/102   Pulse: 74  99 96   Resp: 12  14 29   Temp:  98.3 °F (36.8 °C)     TempSrc:  Oral     SpO2: 96%  96% 97%   Weight:       Height:                                                  BP Readings from Last 3 Encounters:   06/09/22 (!) 190/102   06/01/22 (!) 137/91       NPO Status:                                                                                 BMI:   Wt Readings from Last 3 Encounters:   06/09/22 175 lb (79.4 kg)   06/01/22 185 lb 13.6 oz (84.3 kg)   05/27/22 180 lb (81.6 kg)     Body mass index is 23.73 kg/m². CBC:   Lab Results   Component Value Date    WBC 9.9 06/09/2022    RBC 4.84 06/09/2022    HGB 15.0 06/09/2022    HCT 43.7 06/09/2022    MCV 90.4 06/09/2022    RDW 13.6 06/09/2022     06/09/2022       CMP:   Lab Results   Component Value Date     06/09/2022    K 4.3 06/09/2022     06/09/2022    CO2 25 06/09/2022    BUN 14 06/09/2022    CREATININE 0.6 06/09/2022    GFRAA >60 06/09/2022    AGRATIO 2.1 06/06/2022    LABGLOM >60 06/09/2022    GLUCOSE 118 06/09/2022    PROT 6.8 06/06/2022    CALCIUM 8.8 06/09/2022    BILITOT <0.2 06/06/2022    ALKPHOS 80 06/06/2022    AST 14 06/06/2022    ALT 15 06/06/2022       POC Tests: No results for input(s): POCGLU, POCNA, POCK, POCCL, POCBUN, POCHEMO, POCHCT in the last 72 hours.     Coags: No results found for: PROTIME, INR, APTT    HCG (If Applicable): No results found for: PREGTESTUR, PREGSERUM, HCG, HCGQUANT     ABGs: No results found for: PHART, PO2ART, BVJ9CRA, POU9UPD, BEART, H8UWSYJO     Type & Screen (If Applicable):  No results found for: LABABO, LABRH    Drug/Infectious Status (If Applicable):  No results found for: HIV, HEPCAB    COVID-19 Screening (If Applicable): No results found for: COVID19        Anesthesia Evaluation  Patient summary reviewed and Nursing notes reviewed no history of anesthetic complications:   Airway: Mallampati: IV     Neck ROM: full  Comment: Mouth opening 1cm  Mouth opening: < 3 FB   Dental:          Pulmonary:                              Cardiovascular:    (+) pacemaker:, dysrhythmias:,                   Neuro/Psych:               GI/Hepatic/Renal:             Endo/Other: Abdominal:             Vascular: Other Findings:           Anesthesia Plan      MAC     ASA 3     (Medications & allergies reviewed  All available lab & EKG data reviewed)  Induction: intravenous. Anesthetic plan and risks discussed with patient. Plan discussed with CRNA.                     Nancy Jo MD   6/9/2022

## 2022-06-10 NOTE — DISCHARGE SUMMARY
Order to discharge. All wires and IV removed. Patient understood discharge paperwork. Wife at bedside. Patient signed discharge information/paperwork. All belongings with patient. No further questions from patients at this time. Patient wheeled VIA wheelchair to front door and assisted into car.

## 2022-06-15 PROCEDURE — 93228 REMOTE 30 DAY ECG REV/REPORT: CPT | Performed by: INTERNAL MEDICINE

## 2022-06-16 ENCOUNTER — NURSE ONLY (OUTPATIENT)
Dept: CARDIOLOGY CLINIC | Age: 57
End: 2022-06-16
Payer: COMMERCIAL

## 2022-06-16 DIAGNOSIS — R00.1 SYMPTOMATIC BRADYCARDIA: ICD-10-CM

## 2022-06-16 DIAGNOSIS — I44.1 ATRIOVENTRICULAR BLOCK, TYPE II: ICD-10-CM

## 2022-06-16 DIAGNOSIS — Z95.0 PACEMAKER: ICD-10-CM

## 2022-06-16 DIAGNOSIS — I45.5 SINUS PAUSE: ICD-10-CM

## 2022-06-16 PROCEDURE — 93280 PM DEVICE PROGR EVAL DUAL: CPT | Performed by: INTERNAL MEDICINE

## 2022-06-16 NOTE — PROGRESS NOTES
Patient presents to the device clinic today for a programming evaluation for his pacemaker. Patient has a history of symptomatic bradycardia and sinus pause. Patient's device was implanted on 6/10 by Dr. Benjamin Rene. Since then, no arrhythmias recorded. P wave: 5.1 mV  R wave: 8.4 mV    AP 11.3%  0.7%    All sensing and pacing parameters are within normal range. No changes need to be made at this time. Incision is closed, clean, and dry with all dressings/steri strips removed. Site left open to the air. Incision well approximated. No s/s of infection. Patient education was provided about site care, device functionality, in home monitoring, and any other patient questions and/or concerns were addressed. Aftercare and remote monitoring literature was provided. Patient voices understanding. Patient will follow up in 3 months in office or remotely. Please see interrogation for more detail - Paceart report located under the Cardiology tab.

## 2022-06-16 NOTE — PATIENT INSTRUCTIONS
New Cardiac Device Implant (Pacemaker and/or Defibrillator) Post Op Instructions   Bathe with water indirectly hitting the incision site. Water and soap may run over the incision site. Do not scrub. Pat dry with a clean towel after bathing.  Leave incision open to the air; do not apply any dressings, ointments, or bandages to the area. Do not apply lotion, perfume/cologne, or powders to the area until it is completely healed.  Any scabbing or skin glue that is noted will fall off within 1-2 weeks after the post op appointment.  If any oozing, bleeding, or pus drainage occurs, please call the office immediately at [phone].  Patient has movement restrictions in place until 4 weeks post op (to the day of implant) unless otherwise instructed by physician.  Patient may not lift the device side arm above shoulder height.  Do not far reach or stretch across body or behind body with effected side.  Do not use this arm for pushing, pulling, or lifting body.  Do not use cane on the effected side.  Patient may not lift anything heavier than a gallon of milk with the associated arm. Appointments to expect going forward:   Post operatively the patient will have had a 1-week post op check, a 1 month follow up with NP/MD, and a 3 month follow up with NP/MD and the device clinic. Remote Monitoring Instructions     Within 2-3 weeks of your device being implanted, you will receive a call from the  of your device. Please answer this call as it is to set up remote monitoring for your device. Once you receive your in-home monitor, please follow the instructions provided to sync the home monitor to your implanted device. Once you have paired your home monitor to your implanted device, keep your monitor plugged in within 6 feet of where you sleep.  Your monitor will routinely check in with your device during sleep hours and transmit any urgent events to the [address] [place] [address] Drive for review.  Please do not send additional routine transmissions unless specifically requested by the office staff - the steps to send a manual transmission are the same as when you paired your in-home monitor to your device for the first time.  Your device and monitor are wireless and most transmit cellularly, but please periodically check your monitor is still plugged in to the electrical outlet. If you still use the telephone land line to send, please ensure the connection to the phone yola is secure. This will help to ensure successful automatic transmissions in the future.  Please be aware that the remote device transmission sites are periodically monitored only during regular business hours during which simultaneous in-office device clinics are being conducted. If your transmission requires attention, we will contact you as soon as possible.  Your in-home monitor will do a full report on your device every 3 months (recurring appointments that run parallel to in office checks). You do not need to initiate a transmission or be awake at the appointment time listed - this is solely for office purposes.  Our office utilizes the \"no news is good news\" narrative regarding remote monitoring. A Device Specialist or RN will contact you with any critical findings from your remote monitoring. Going forward, if you experience issues with your in-home monitor, please verify that it is plugged in to the wall. If issues persist, please contact the Customer Service numbers provided below, as they can troubleshoot issues that may be happening with the monitor itself. The [employer] works closely with the remote monitoring websites - if we notice there is a disconnection we will contact you to inform you and ask you to contact the  of your device.     [employer])  [phone]

## 2022-06-23 ENCOUNTER — HOSPITAL ENCOUNTER (OUTPATIENT)
Dept: CT IMAGING | Age: 57
Discharge: HOME | End: 2022-06-23
Payer: COMMERCIAL

## 2022-06-23 DIAGNOSIS — C10.3: ICD-10-CM

## 2022-06-23 PROCEDURE — 71250 CT THORAX DX C-: CPT

## 2022-06-27 DIAGNOSIS — R55 SYNCOPE AND COLLAPSE: ICD-10-CM

## 2022-06-27 DIAGNOSIS — R00.1 SYMPTOMATIC BRADYCARDIA: ICD-10-CM

## 2022-07-11 ENCOUNTER — NURSE ONLY (OUTPATIENT)
Dept: CARDIOLOGY CLINIC | Age: 57
End: 2022-07-11
Payer: COMMERCIAL

## 2022-07-11 DIAGNOSIS — Z95.0 PACEMAKER: ICD-10-CM

## 2022-07-11 DIAGNOSIS — I44.1 ATRIOVENTRICULAR BLOCK, TYPE II: ICD-10-CM

## 2022-07-11 PROCEDURE — 93288 INTERROG EVL PM/LDLS PM IP: CPT | Performed by: INTERNAL MEDICINE

## 2022-07-14 ENCOUNTER — TELEPHONE (OUTPATIENT)
Dept: CARDIOLOGY CLINIC | Age: 57
End: 2022-07-14

## 2022-07-14 NOTE — TELEPHONE ENCOUNTER
CARDIAC CLEARANCE REQUEST    What type of procedure are you having:   j tube  Are you taking any blood thinners:  unsure  Type on anesthesia:  general  When is your procedure scheduled for:  07/19  What physician is performing your procedure:  Dr. Chong Valentine   Phone Number:  [phone]  Fax number to send the letter:  [phone]

## 2022-07-14 NOTE — LETTER
[address] Cardiology - [address]  Phone: [phone]  Fax: [phone]    Randell Concepcion MD        July 18, 2022    [facility]  1965  Patient is at low risk for MACE during an intermediate risk procedure. No further cardiac testing needed at this time.      Sincerely,        Randell Concepcion MD

## 2022-07-18 ENCOUNTER — TELEPHONE (OUTPATIENT)
Dept: SURGERY | Age: 57
End: 2022-07-18

## 2022-07-18 NOTE — TELEPHONE ENCOUNTER
MD Urszula Brown [place], MA; [facility] Staff 1 hour ago (2:56 PM)     JK    Patient at low risk for MACE during an intermediate risk procedure. No further cardiac testing indicated.     Letter created and faxed to number provided

## 2022-07-18 NOTE — TELEPHONE ENCOUNTER
Requested cardiac clearance from Dr. Hitchcock Real office. Called patient to rickie self prior to j-tube placement and to pick a surgery date. Patient would like for me to speak to his wife. Left message for patient's wife, Bang Fraser, to call me back.

## 2022-07-18 NOTE — LETTER
Surgery Scheduling Form:  DEMOGRAPHICS:                                                                                                         .  Patient Name:  Mauro Perry  Patient :  1965   Patient SS#:      Patient Phone:  [phone] (home)                         Alt. Patient Phone:                 Patient Address:  [address] [place] [id number]  PCP:  Xiomara Mccray DO  Insurance:  Payor: [employer] / Plan: [id number] / Product Type: *No Product type* /        Insurance ID Number:    Payer/Plan Subscr  Sex Relation Sub. Ins. ID Effective Group Num   1. THE [employer] [address] M 1965 Male Self [id number]                                   [place] [id number]     Interpretor Needed:  (NO)  (TYPE)           LATEX ALLERGY:  (NO)  Allergies: Erythromycin Tetanus Toxoid  Defibulator or Pacemaker:  (Yes, pacer- [employer]tronic)    DIAGNOSIS & PROCEDURE:                                                                                       .  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 Mynor Tirado    SCHEDULING INFORMATION:                                                                                    .  Surgeon's Scheduling Instruction:  elective  Requested Date: 22     OR Time: 945 am            Patient Arrival Time: 745 am  OR Time Required:  60  Minutes  Anesthesia:  General       Equipment:  robot                                                            SA Required (only for Mac and Gen): yes  Status:  Inpatient        Standard C-Arm (only for port and paresh):  n/a   Mini C-Arm: No   PAT Required:   Yes                                          H&P needs to be completed: [facility]  Cardiac Clearance Requested:  (Dr. Ton Olivarez)               PRE-CERTIFICATION INFORMATION: Yudith Veliz   Procedure/CPT code:  Robotic j-tube placement 36459       Modifier:

## 2022-07-18 NOTE — TELEPHONE ENCOUNTER
Spoke with the patient, scheduled for j-tube placement on 7/29/22 at 2:15 pm with an arrival of 1215 pm . Informed the patient that PAT will also call with further instructions. Informed the patient that [facility] paperwork can be sent to our office and will be completed in a timely manner. Patient verbally states that he/she understands pre-op instructions. Patient notified of pre-op instructions for general/mac anesthesia:    *NPO after midnight     *H&P prior to surgery - [facility]    *Cardiac clearance, if needed.- Dr. Soto Rodriguez    *Stop all blood thinners, if needed. - n/a    *Will need a     *Call the office with any further questions. *Procedure/Surgery time at this point is tentative time as PAT will confirm arrival time.

## 2022-07-21 ENCOUNTER — OFFICE VISIT (OUTPATIENT)
Dept: SURGERY | Age: 57
End: 2022-07-21
Payer: COMMERCIAL

## 2022-07-21 VITALS
WEIGHT: 174 LBS | HEART RATE: 91 BPM | DIASTOLIC BLOOD PRESSURE: 89 MMHG | SYSTOLIC BLOOD PRESSURE: 147 MMHG | HEIGHT: 72 IN | BODY MASS INDEX: 23.57 KG/M2

## 2022-07-21 DIAGNOSIS — C05.1 MALIGNANT NEOPLASM OF SOFT PALATE (HCC): Primary | ICD-10-CM

## 2022-07-21 DIAGNOSIS — E44.0 MODERATE PROTEIN-CALORIE MALNUTRITION (HCC): ICD-10-CM

## 2022-07-21 PROCEDURE — 99244 OFF/OP CNSLTJ NEW/EST MOD 40: CPT | Performed by: SURGERY

## 2022-07-23 NOTE — PROGRESS NOTES
Food Insecurity: Not on file   Transportation Needs: Not on file   Physical Activity: Not on file   Stress: Not on file   Social Connections: Not on file   Intimate Partner Violence: Not on file   Housing Stability: Not on file         Family History:        Problem Relation Age of Onset    Diabetes Father     Cancer Father     Heart Disease Father        REVIEW OF SYSTEMS:  CONSTITUTIONAL:  positive for  weight loss  HEENT:  jaw pain  RESPIRATORY:  negative  CARDIOVASCULAR:  negative  GASTROINTESTINAL:  negative  GENITOURINARY:  negative  HEMATOLOGIC/LYMPHATIC:  negative  NEUROLOGICAL:  Negative  * All other ROS reviewed and negative. PHYSICAL EXAM:  VITALS:  BP (!) 147/89   Pulse 91   Ht 6' (1.829 m)   Wt 174 lb (78.9 kg)   BMI 23.60 kg/m²   24HR INTAKE/OUTPUT:    [unfilled]  @Megadyne@      CONSTITUTIONAL:  alert, no apparent distress and normal weight  EYES:  PERRL,   ENT:  Normocephalic,atraumatic, without obvious abnormality  NECK:   symmetrical, trachea midline  LUNGS: Resp effort easy and unlabored  CARDIOVASCULAR:  NO JVD, regular rate   ABDOMEN:  , normal bowel sounds, soft, non-distended, non-tender  MUSCULOSKELETAL: No clubbing or cyanosis, 0+ pitting edema lower extremities  NEUROLOGIC:  Mental Status Exam:  Level of Alertness:   awake  PSYCHIATRIC:   person, place, time  SKIN:  no bruising or bleeding    DATA:    CBC: No results for input(s): WBC, HGB, HCT, PLT in the last 72 hours. BMP:  No results for input(s): NA, K, CL, CO2, BUN, CREATININE, GLUCOSE in the last 72 hours. Hepatic: No results for input(s): AST, ALT, ALB, BILITOT, ALKPHOS in the last 72 hours. Mag:    No results for input(s): MG in the last 72 hours. Phos:   No results for input(s): PHOS in the last 72 hours. INR: No results for input(s): INR in the last 72 hours. Radiology Review: Images personally reviewed by me.    NA      IMPRESSION/RECOMMENDATIONS:    59 yo with oropharyngeal cancer, malnutrition Discussed with patient that a gtube would be preferable over a jtube. Also discussed that to do a feeding tube would need general anesthesia and intubation. Will need to discuss with Anesthesia and ENT best plan for doing so given his limited jaw mobility and location of mass on imaging and flex laryngoscopy. Whether he can be intubated or if tracheostomy needed? Once intubated would probably do laparoscopic/robotic feeding over PEG for the same reasons as above.     Electronically signed by Tomas Myers, [address] [place] [address]  [phone]

## 2022-07-25 NOTE — PROGRESS NOTES
[address]   Electrophysiology Outpatient Note              Date:  July 26, 2022  Patient name: Lexus Wiley  YOB: 1965    Primary Care physician: Asmita Ospina DO    HISTORY OF PRESENT ILLNESS: The patient is a 60 y.o.  male with a history of transient complete heart block, SSS, syncope and oropharyngeal cancer. On 5/20/2022 he was admitted for syncope. He was discharged with a cardiac monitor. Cardiac monitor showed symptomatic pauses. On 6/06/2022 he had a dual chamber pacemaker implanted. On 6/09/2022 he underwent right lead revision. Today he is being seen for complete heart block and sick sinus syndrome. Device check on 7/11/2022 showed 0.2% , 4.1% AP. 15 years left on battery. One episode of NSVT (3 seconds) and SVT (13 seconds) noted. He has no complaints today, his incision has healed well. He denies chest pain, palpitations, shortness of breath, and dizziness. No recurrences of syncope. He does not check his BP at home. He has radiation later today. He has done one round of chemotherapy and has another round scheduled for Monday. He is having his Gtube placed on Friday. Past Medical History:   has a past medical history of CAD (coronary artery disease), Cancer ([address] and Hypertension. Past Surgical History:   has no past surgical history on file. Home Medications:    Prior to Admission medications    Medication Sig Start Date End Date Taking? Authorizing Provider   Magic Mouthwash (MIRACLE MOUTHWASH) Swish and spit 5 mLs 4 times daily as needed for Irritation   Yes Historical Provider, MD   morphine 10 MG/5ML solution Take 0.25 mLs by mouth. 7/1/22  Yes Historical Provider, MD   ondansetron (ZOFRAN) 8 MG tablet Take 1 tablet by mouth 7/1/22  Yes Historical Provider, MD     Allergies:  Erythromycin base and Tetanus toxoid    Social History:   reports that he has been smoking cigarettes. He has been smoking an average of .25 packs per day.  He has quit using smokeless tobacco.  His smokeless tobacco use included chew. He reports current alcohol use. He reports that he does not use drugs. Family History: family history includes Cancer in his father; Diabetes in his father; Heart Disease in his father. All 14 point review of systems are completed and pertinent positives are mentioned in the history of present illness. Other systems are reviewed and are negative. PHYSICAL EXAM:    Vital signs:    BP (!) 142/90   Pulse 96   Ht 5' 9\" (1.753 m)   Wt 175 lb (79.4 kg)   SpO2 98%   BMI 25.84 kg/m²      Constitutional and general appearance: alert, cooperative, no distress, and appears stated age  HEENT: PERRL, no cervical lymphadenopathy. No masses palpable. Normal oral mucosa  Respiratory:  Normal excursion and expansion without use of accessory muscles  Resp auscultation: Normal breath sounds without wheezing, rhonchi, and rales  Cardiovascular: The apical impulse is not displaced  Heart tones are crisp and normal. regular S1 and S2.  Jugular venous pulsation Normal  The carotid upstroke is normal in amplitude and contour without delay or bruit  Peripheral pulses are symmetrical and full   Abdomen:  No masses or tenderness  Bowel sounds present  Extremities:   No cyanosis or clubbing   No lower extremity edema   Skin: warm and dry  Neurological:  Alert and oriented  Moves all extremities well  No abnormalities of mood, affect, memory, mentation, or behavior are noted    DATA:    Echo 5/31/2022:  Summary   Normal left ventricle systolic function with an estimated ejection fraction   of 55%. Septal wall asymmetrical left ventricular hypertrophy is present. No regional wall motion abnormalities are seen. Normal left ventricular diastolic filling pressures. The right ventricle is normal in size and function. Aortic valve appears sclerotic but opens adequately. Mild mitral annular calcification is present. Mild aortic valve regurgitation. Trace tricuspid valve regurgitation. Systolic pulmonary artery pressure (sPAP) is normal and estimated at 27 mmHg   (right atrial pressure 8 mmHg)   A bubble study was performed and shows evidence of right to left shunting   consistent with a patent foramen ovale or atrial septal defect. Pre-mature beats throughout the study. All labs and testing reviewed. CARDIOLOGY LABS:   CBC: No results for input(s): WBC, HGB, HCT, PLT in the last 72 hours. BMP: No results for input(s): NA, K, CO2, BUN, CREATININE, LABGLOM, GLUCOSE in the last 72 hours. PT/INR: No results for input(s): PROTIME, INR in the last 72 hours. APTT:No results for input(s): APTT in the last 72 hours. FASTING LIPID PANEL:  Lab Results   Component Value Date/Time    HDL 30 05/31/2022 04:31 AM    LDLCALC 129 05/31/2022 04:31 AM    TRIG 113 05/31/2022 04:31 AM     LIVER PROFILE:No results for input(s): AST, ALT, ALB in the last 72 hours. IMPRESSION:    Patient Active Problem List   Diagnosis    Symptomatic bradycardia    Patent foramen ovale with right to left shunt    Malignant neoplasm of soft palate (HCC)    Moderate protein-calorie malnutrition (HCC)    Sinus pause    Pacemaker     Assessment:   Transient complete heart block and sick sinus syndrome: ongoing, stable   -s/p dual chamber pacemaker 6/06/2022   -s/p right lead revision 6/09/2022   -device check per HPI  Hx of syncope: see above  Oropharyngeal cancer:    -undergoing radiation and chemotherapy    Plan:   1. No changes in medications (not on any cardiac medications)  2. Remote device transmissions every three months    3. Follow up on 9/08/2022    MDM moderate      Patient was seen outside of global device window for complete heart block.      RAMON Morris-CNP  [facility]  [phone]

## 2022-07-26 ENCOUNTER — OFFICE VISIT (OUTPATIENT)
Dept: CARDIOLOGY CLINIC | Age: 57
End: 2022-07-26
Payer: COMMERCIAL

## 2022-07-26 VITALS
DIASTOLIC BLOOD PRESSURE: 90 MMHG | OXYGEN SATURATION: 98 % | HEART RATE: 96 BPM | HEIGHT: 69 IN | WEIGHT: 175 LBS | BODY MASS INDEX: 25.92 KG/M2 | SYSTOLIC BLOOD PRESSURE: 142 MMHG

## 2022-07-26 DIAGNOSIS — I44.2 COMPLETE HEART BLOCK (HCC): Primary | ICD-10-CM

## 2022-07-26 DIAGNOSIS — I44.1 ATRIOVENTRICULAR BLOCK, TYPE II: ICD-10-CM

## 2022-07-26 DIAGNOSIS — Z95.0 PACEMAKER: ICD-10-CM

## 2022-07-26 PROCEDURE — 99214 OFFICE O/P EST MOD 30 MIN: CPT

## 2022-07-27 ENCOUNTER — TELEPHONE (OUTPATIENT)
Dept: SURGERY | Age: 57
End: 2022-07-27

## 2022-07-29 ENCOUNTER — ANESTHESIA (OUTPATIENT)
Dept: OPERATING ROOM | Age: 57
End: 2022-07-29
Payer: COMMERCIAL

## 2022-07-29 ENCOUNTER — ANESTHESIA EVENT (OUTPATIENT)
Dept: OPERATING ROOM | Age: 57
End: 2022-07-29
Payer: COMMERCIAL

## 2022-07-29 ENCOUNTER — HOSPITAL ENCOUNTER (OUTPATIENT)
Age: 57
Setting detail: SURGERY ADMIT
Discharge: HOME | End: 2022-07-29
Attending: SURGERY | Admitting: SURGERY
Payer: COMMERCIAL

## 2022-07-29 VITALS
DIASTOLIC BLOOD PRESSURE: 100 MMHG | HEART RATE: 92 BPM | SYSTOLIC BLOOD PRESSURE: 178 MMHG | OXYGEN SATURATION: 95 % | RESPIRATION RATE: 14 BRPM | TEMPERATURE: 97.5 F

## 2022-07-29 PROBLEM — R13.19 OTHER DYSPHAGIA: Status: ACTIVE | Noted: 2022-07-29

## 2022-07-29 PROCEDURE — 3700000001 HC ADD 15 MINUTES (ANESTHESIA): Performed by: SURGERY

## 2022-07-29 PROCEDURE — 6360000002 HC RX W HCPCS: Performed by: NURSE ANESTHETIST, CERTIFIED REGISTERED

## 2022-07-29 PROCEDURE — 7100000011 HC PHASE II RECOVERY - ADDTL 15 MIN: Performed by: SURGERY

## 2022-07-29 PROCEDURE — 7100000010 HC PHASE II RECOVERY - FIRST 15 MIN: Performed by: SURGERY

## 2022-07-29 PROCEDURE — S2900 ROBOTIC SURGICAL SYSTEM: HCPCS | Performed by: SURGERY

## 2022-07-29 PROCEDURE — 3600000019 HC SURGERY ROBOT ADDTL 15MIN: Performed by: SURGERY

## 2022-07-29 PROCEDURE — 6370000000 HC RX 637 (ALT 250 FOR IP): Performed by: ANESTHESIOLOGY

## 2022-07-29 PROCEDURE — 2500000003 HC RX 250 WO HCPCS: Performed by: SURGERY

## 2022-07-29 PROCEDURE — 43653 LAPAROSCOPY GASTROSTOMY: CPT | Performed by: SURGERY

## 2022-07-29 PROCEDURE — 2500000003 HC RX 250 WO HCPCS: Performed by: NURSE ANESTHETIST, CERTIFIED REGISTERED

## 2022-07-29 PROCEDURE — 2580000003 HC RX 258: Performed by: NURSE ANESTHETIST, CERTIFIED REGISTERED

## 2022-07-29 PROCEDURE — 3700000000 HC ANESTHESIA ATTENDED CARE: Performed by: SURGERY

## 2022-07-29 PROCEDURE — 7100000001 HC PACU RECOVERY - ADDTL 15 MIN: Performed by: SURGERY

## 2022-07-29 PROCEDURE — 6360000002 HC RX W HCPCS

## 2022-07-29 PROCEDURE — 2500000003 HC RX 250 WO HCPCS: Performed by: ANESTHESIOLOGY

## 2022-07-29 PROCEDURE — 6360000002 HC RX W HCPCS: Performed by: SURGERY

## 2022-07-29 PROCEDURE — 3600000009 HC SURGERY ROBOT BASE: Performed by: SURGERY

## 2022-07-29 PROCEDURE — 7100000000 HC PACU RECOVERY - FIRST 15 MIN: Performed by: SURGERY

## 2022-07-29 PROCEDURE — 2720000010 HC SURG SUPPLY STERILE: Performed by: SURGERY

## 2022-07-29 PROCEDURE — 2709999900 HC NON-CHARGEABLE SUPPLY: Performed by: SURGERY

## 2022-07-29 RX ORDER — SODIUM CHLORIDE 0.9 % (FLUSH) 0.9 %
5-40 SYRINGE (ML) INJECTION PRN
Status: DISCONTINUED | OUTPATIENT
Start: 2022-07-29 | End: 2022-07-29 | Stop reason: HOSPADM

## 2022-07-29 RX ORDER — OXYMETAZOLINE HYDROCHLORIDE 0.05 G/100ML
SPRAY NASAL
Status: DISCONTINUED
Start: 2022-07-29 | End: 2022-07-29 | Stop reason: HOSPADM

## 2022-07-29 RX ORDER — SODIUM CHLORIDE, SODIUM LACTATE, POTASSIUM CHLORIDE, CALCIUM CHLORIDE 600; 310; 30; 20 MG/100ML; MG/100ML; MG/100ML; MG/100ML
INJECTION, SOLUTION INTRAVENOUS CONTINUOUS
Status: DISCONTINUED | OUTPATIENT
Start: 2022-07-29 | End: 2022-07-29 | Stop reason: HOSPADM

## 2022-07-29 RX ORDER — OXYCODONE HYDROCHLORIDE 5 MG/1
TABLET ORAL
Status: DISCONTINUED
Start: 2022-07-29 | End: 2022-07-29 | Stop reason: HOSPADM

## 2022-07-29 RX ORDER — FENTANYL CITRATE 50 UG/ML
INJECTION, SOLUTION INTRAMUSCULAR; INTRAVENOUS PRN
Status: DISCONTINUED | OUTPATIENT
Start: 2022-07-29 | End: 2022-07-29 | Stop reason: SDUPTHER

## 2022-07-29 RX ORDER — SODIUM CHLORIDE, SODIUM LACTATE, POTASSIUM CHLORIDE, CALCIUM CHLORIDE 600; 310; 30; 20 MG/100ML; MG/100ML; MG/100ML; MG/100ML
INJECTION, SOLUTION INTRAVENOUS CONTINUOUS PRN
Status: DISCONTINUED | OUTPATIENT
Start: 2022-07-29 | End: 2022-07-29 | Stop reason: SDUPTHER

## 2022-07-29 RX ORDER — KETAMINE HYDROCHLORIDE 50 MG/ML
INJECTION, SOLUTION, CONCENTRATE INTRAMUSCULAR; INTRAVENOUS PRN
Status: DISCONTINUED | OUTPATIENT
Start: 2022-07-29 | End: 2022-07-29 | Stop reason: SDUPTHER

## 2022-07-29 RX ORDER — BUPIVACAINE HYDROCHLORIDE AND EPINEPHRINE 5; 5 MG/ML; UG/ML
INJECTION, SOLUTION PERINEURAL PRN
Status: DISCONTINUED | OUTPATIENT
Start: 2022-07-29 | End: 2022-07-29 | Stop reason: HOSPADM

## 2022-07-29 RX ORDER — PHENYLEPHRINE HCL IN 0.9% NACL 1 MG/10 ML
SYRINGE (ML) INTRAVENOUS PRN
Status: DISCONTINUED | OUTPATIENT
Start: 2022-07-29 | End: 2022-07-29 | Stop reason: SDUPTHER

## 2022-07-29 RX ORDER — SODIUM CHLORIDE 0.9 % (FLUSH) 0.9 %
5-40 SYRINGE (ML) INJECTION EVERY 12 HOURS SCHEDULED
Status: DISCONTINUED | OUTPATIENT
Start: 2022-07-29 | End: 2022-07-29 | Stop reason: HOSPADM

## 2022-07-29 RX ORDER — GLYCOPYRROLATE 0.2 MG/ML
INJECTION INTRAMUSCULAR; INTRAVENOUS PRN
Status: DISCONTINUED | OUTPATIENT
Start: 2022-07-29 | End: 2022-07-29 | Stop reason: SDUPTHER

## 2022-07-29 RX ORDER — PROPOFOL 10 MG/ML
INJECTION, EMULSION INTRAVENOUS PRN
Status: DISCONTINUED | OUTPATIENT
Start: 2022-07-29 | End: 2022-07-29 | Stop reason: SDUPTHER

## 2022-07-29 RX ORDER — LABETALOL HYDROCHLORIDE 5 MG/ML
10 INJECTION, SOLUTION INTRAVENOUS ONCE
Status: COMPLETED | OUTPATIENT
Start: 2022-07-29 | End: 2022-07-29

## 2022-07-29 RX ORDER — ONDANSETRON 2 MG/ML
INJECTION INTRAMUSCULAR; INTRAVENOUS PRN
Status: DISCONTINUED | OUTPATIENT
Start: 2022-07-29 | End: 2022-07-29 | Stop reason: SDUPTHER

## 2022-07-29 RX ORDER — MIDAZOLAM HYDROCHLORIDE 1 MG/ML
INJECTION INTRAMUSCULAR; INTRAVENOUS PRN
Status: DISCONTINUED | OUTPATIENT
Start: 2022-07-29 | End: 2022-07-29 | Stop reason: SDUPTHER

## 2022-07-29 RX ORDER — ROCURONIUM BROMIDE 10 MG/ML
INJECTION, SOLUTION INTRAVENOUS PRN
Status: DISCONTINUED | OUTPATIENT
Start: 2022-07-29 | End: 2022-07-29 | Stop reason: SDUPTHER

## 2022-07-29 RX ORDER — OXYCODONE HYDROCHLORIDE 5 MG/1
10 TABLET ORAL
Status: COMPLETED | OUTPATIENT
Start: 2022-07-29 | End: 2022-07-29

## 2022-07-29 RX ORDER — LIDOCAINE HYDROCHLORIDE 10 MG/ML
0.3 INJECTION, SOLUTION EPIDURAL; INFILTRATION; INTRACAUDAL; PERINEURAL
Status: DISCONTINUED | OUTPATIENT
Start: 2022-07-29 | End: 2022-07-29 | Stop reason: HOSPADM

## 2022-07-29 RX ORDER — DEXAMETHASONE SODIUM PHOSPHATE 10 MG/ML
INJECTION INTRAMUSCULAR; INTRAVENOUS PRN
Status: DISCONTINUED | OUTPATIENT
Start: 2022-07-29 | End: 2022-07-29 | Stop reason: SDUPTHER

## 2022-07-29 RX ORDER — SODIUM CHLORIDE 9 MG/ML
INJECTION, SOLUTION INTRAVENOUS PRN
Status: DISCONTINUED | OUTPATIENT
Start: 2022-07-29 | End: 2022-07-29 | Stop reason: HOSPADM

## 2022-07-29 RX ADMIN — FENTANYL CITRATE 100 MCG: 50 INJECTION INTRAMUSCULAR; INTRAVENOUS at 14:56

## 2022-07-29 RX ADMIN — KETAMINE HYDROCHLORIDE 60 MG: 50 INJECTION INTRAMUSCULAR; INTRAVENOUS at 14:11

## 2022-07-29 RX ADMIN — Medication 0.25 MG: at 16:32

## 2022-07-29 RX ADMIN — SUGAMMADEX 200 MG: 100 INJECTION, SOLUTION INTRAVENOUS at 15:41

## 2022-07-29 RX ADMIN — ROCURONIUM BROMIDE 50 MG: 10 SOLUTION INTRAVENOUS at 14:35

## 2022-07-29 RX ADMIN — SODIUM CHLORIDE, SODIUM LACTATE, POTASSIUM CHLORIDE, AND CALCIUM CHLORIDE: .6; .31; .03; .02 INJECTION, SOLUTION INTRAVENOUS at 14:07

## 2022-07-29 RX ADMIN — DEXAMETHASONE SODIUM PHOSPHATE 10 MG: 10 INJECTION INTRAMUSCULAR; INTRAVENOUS at 14:45

## 2022-07-29 RX ADMIN — Medication 100 MCG: at 14:47

## 2022-07-29 RX ADMIN — MIDAZOLAM HYDROCHLORIDE 5 MG: 2 INJECTION, SOLUTION INTRAMUSCULAR; INTRAVENOUS at 14:04

## 2022-07-29 RX ADMIN — GLYCOPYRROLATE 0.2 MG: 0.2 INJECTION, SOLUTION INTRAMUSCULAR; INTRAVENOUS at 14:04

## 2022-07-29 RX ADMIN — CEFAZOLIN 2000 MG: 10 INJECTION, POWDER, FOR SOLUTION INTRAVENOUS at 14:15

## 2022-07-29 RX ADMIN — LABETALOL HYDROCHLORIDE 10 MG: 5 INJECTION INTRAVENOUS at 16:05

## 2022-07-29 RX ADMIN — ONDANSETRON 4 MG: 2 INJECTION INTRAMUSCULAR; INTRAVENOUS at 14:45

## 2022-07-29 RX ADMIN — OXYCODONE HYDROCHLORIDE 10 MG: 5 TABLET ORAL at 17:19

## 2022-07-29 RX ADMIN — HYDROMORPHONE HYDROCHLORIDE 0.25 MG: 1 INJECTION, SOLUTION INTRAMUSCULAR; INTRAVENOUS; SUBCUTANEOUS at 16:32

## 2022-07-29 RX ADMIN — PROPOFOL 200 MG: 10 INJECTION, EMULSION INTRAVENOUS at 14:35

## 2022-07-29 ASSESSMENT — LIFESTYLE VARIABLES: SMOKING_STATUS: 1

## 2022-07-29 ASSESSMENT — PAIN SCALES - GENERAL
PAINLEVEL_OUTOF10: 7
PAINLEVEL_OUTOF10: 0

## 2022-07-29 ASSESSMENT — PAIN DESCRIPTION - ORIENTATION: ORIENTATION: LEFT

## 2022-07-29 ASSESSMENT — PAIN DESCRIPTION - LOCATION: LOCATION: ABDOMEN

## 2022-07-29 ASSESSMENT — PAIN DESCRIPTION - DESCRIPTORS: DESCRIPTORS: ACHING;BURNING

## 2022-07-29 NOTE — H&P
I have reviewed the history and physical and examined the patient. I find no relevant changes. I have reviewed with the patient and/or family members, during the preoperative office visit the risks, benefits, and alternatives to the procedure.     Agapito Vicente MD

## 2022-07-29 NOTE — ANESTHESIA PRE PROCEDURE
Department of Anesthesiology  Preprocedure Note       Name:  Curt Slagado   Age:  60 y.o.  :  1965                                          MRN:  [id number]         Date:  2022      Surgeon: Lance Moralez):  Vinicio Santana MD    Procedure: Procedure(s):  ROBOTIC GASTROSTOMY TUBE PLACEMENT, POSSIBLE JEJUNOSTOMY TUBE PLACEMENT, POSSIBLE OPEN PROCEDURE    Medications prior to admission:   Prior to Admission medications    Medication Sig Start Date End Date Taking? Authorizing Provider   Magic Mouthwash (MIRACLE MOUTHWASH) Swish and spit 5 mLs 4 times daily as needed for Irritation    Historical Provider, MD   morphine 10 MG/5ML solution Take 0.25 mLs by mouth. 22   Historical Provider, MD   ondansetron (ZOFRAN) 8 MG tablet Take 1 tablet by mouth 22   Historical Provider, MD       Current medications:    Current Facility-Administered Medications   Medication Dose Route Frequency Provider Last Rate Last Admin    ceFAZolin (ANCEF) 2000 mg in dextrose 5 % 100 mL IVPB  2,000 mg IntraVENous On Call to Gavin Buenrostro MD        lidocaine PF 1 % injection 0.3 mL  0.3 mL IntraDERmal Once PRN Delroy Curiel MD        lactated ringers infusion   IntraVENous Continuous Delroy Curiel MD        sodium chloride flush 0.9 % injection 5-40 mL  5-40 mL IntraVENous 2 times per day Delroy Curiel MD        sodium chloride flush 0.9 % injection 5-40 mL  5-40 mL IntraVENous PRN Delroy Curiel MD        0.9 % sodium chloride infusion   IntraVENous PRN Delroy Curiel MD        oxymetazoline (AFRIN) 0.05 % nasal spray                Allergies:     Allergies   Allergen Reactions    Erythromycin Base      uncoated    Tetanus Toxoid      last injection was at age 13, got very high fever       Problem List:    Patient Active Problem List   Diagnosis Code    Symptomatic bradycardia R00.1    Patent foramen ovale with right to left shunt Q21.1    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 No pertinent surgical history.     Social History:    Social History     Tobacco Use    Smoking status: Every Day     Packs/day: 0.25     Types: Cigarettes    Smokeless tobacco: Former     Types: Chew   Substance Use Topics    Alcohol use: Yes     Comment: 2 drinks a week                                Ready to quit: Not Answered  Counseling given: Not Answered      Vital Signs (Current):   Vitals:    07/29/22 1245   BP: 133/84   Pulse: 98   Resp: 17   Temp: 97 °F (36.1 °C)   TempSrc: Temporal   SpO2: 95%                                              BP Readings from Last 3 Encounters:   07/29/22 133/84   07/26/22 (!) 142/90   07/21/22 (!) 147/89       NPO Status: Time of last liquid consumption: 2200                        Time of last solid consumption: 2200                        Date of last liquid consumption: 07/28/22                        Date of last solid food consumption: 07/28/22    BMI:   Wt Readings from Last 3 Encounters:   07/26/22 175 lb (79.4 kg)   07/21/22 174 lb (78.9 kg)   06/09/22 175 lb (79.4 kg)     There is no height or weight on file to calculate BMI.    CBC:   Lab Results   Component Value Date/Time    WBC 9.9 06/09/2022 04:28 AM    RBC 4.84 06/09/2022 04:28 AM    HGB 15.0 06/09/2022 04:28 AM    HCT 43.7 06/09/2022 04:28 AM    MCV 90.4 06/09/2022 04:28 AM    RDW 13.6 06/09/2022 04:28 AM     06/09/2022 04:28 AM       CMP:   Lab Results   Component Value Date/Time     06/09/2022 04:28 AM    K 4.3 06/09/2022 04:28 AM     06/09/2022 04:28 AM    CO2 25 06/09/2022 04:28 AM    BUN 14 06/09/2022 04:28 AM    CREATININE 0.6 06/09/2022 04:28 AM    GFRAA >60 06/09/2022 04:28 AM    AGRATIO 2.1 06/06/2022 10:21 PM    BAR >60 06/09/2022 04:28 AM    GLUCOSE 118 06/09/2022 04:28 AM    PROT 6.8 06/06/2022 10:21 PM    CALCIUM 8.8 06/09/2022 04:28 AM    BILITOT <0.2 06/06/2022 10:21 PM    ALKPHOS 80 06/06/2022 10:21 PM    AST 14 06/06/2022 10:21 PM    ALT 15 06/06/2022 10:21 PM       POC Tests: No results for input(s): POCGLU, POCNA, POCK, POCCL, POCBUN, POCHEMO, POCHCT in the last 72 hours. Coags: No results found for: PROTIME, INR, APTT    HCG (If Applicable): No results found for: PREGTESTUR, PREGSERUM, HCG, HCGQUANT     ABGs: No results found for: PHART, PO2ART, PAI8CDB, KBS7PXW, BEART, Q7OBZPSX     Type & Screen (If Applicable):  No results found for: LABABO, LABRH    Drug/Infectious Status (If Applicable):  No results found for: HIV, HEPCAB    COVID-19 Screening (If Applicable): No results found for: COVID19        Anesthesia Evaluation   no history of anesthetic complications:   Airway: Mallampati: IV  TM distance: >3 FB   Neck ROM: full  Mouth opening: < 3 FB   Dental: normal exam         Pulmonary:   (+) current smoker                           Cardiovascular:    (+) hypertension:, valvular problems/murmurs (PFO):, pacemaker: pacemaker, CAD:,                   Neuro/Psych:   Negative Neuro/Psych ROS              GI/Hepatic/Renal:            ROS comment: Oral CA, trismus. Endo/Other: Negative Endo/Other ROS                    Abdominal:             Vascular: negative vascular ROS. Other Findings: 9mm interincisor distance. Unclear whether muscle relaxation will relax masseter          Anesthesia Plan      general     ASA 3     (Pt agrees to risks, benefits and alternatives of GETA with awake intubation. Aware of risk of airway difficulty, dental damage and need for trach. .  Questions answered. Willing to proceed with plan.)  Induction: intravenous. Anesthetic plan and risks discussed with patient and spouse.                         Adelaide Hernandez MD   7/29/2022

## 2022-07-29 NOTE — DISCHARGE INSTRUCTIONS
[facility] SURGERY [facility] AND [facility]. Les Munoz M.D. [address] [id number] [address]                [address] M[address]D. [address] [facility] , [address]         [place], [address] [facility] Shira Guerra M.D                         [phone]          [facility] Rosy VENESSA Yost M.D. [facility]      POST-OPERATIVE INSTRUCTIONS    Call the office for follow up in two weeks    You will have either white steri-strips and a water occlusive dressing or surgical glue closing your incisions. You may shower. Wash incision gently, and pat dry. Do not rub your incisions. Watch for signs of infection:    Fever over 100.5°     Excessive warmth or bright redness around your incisions   Leakage of bloody or cloudy fluid from you incisions   ANESTHESIA DISCHARGE INSTRUCTIONS    You are under the influence of drugs- do not drink alcohol, drive a car, operate machinery(such as power tools, kitchen appliances, etc), sign legal documents, or make any important decisions for 24 hours (or while on pain medications). Children should not ride bikes or [place] or play on gym sets  for 24 hours after surgery. A responsible adult should be with you for 24 hours. Rest at home today- increase activity as tolerated. Progress slowly to a regular diet unless your physician has instructed you otherwise. Drink plenty of water. CALL YOUR DOCTOR IF YOU:  Have moderate to severe nausea or vomiting AND are unable to hold down fluids or prescribed medications. Have bright red bloody drainage from your dressing that won't stop oozing.   Do not get relief with your pain medication    NORMAL (POSSIBLE) SIDE EFFECTS FROM ANESTHESIA:     Confusion, temporary memory loss, delayed reaction

## 2022-07-29 NOTE — ANESTHESIA POSTPROCEDURE EVALUATION
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 Mauro Phase I: Mauro Score: 7    Mauro Phase II: Mauro Score: 10      Anesthesia Post Evaluation    Patient location during evaluation: PACU  Patient participation: complete - patient participated  Level of consciousness: awake and alert  Airway patency: patent  Nausea & Vomiting: no nausea and no vomiting  Complications: no  Cardiovascular status: blood pressure returned to baseline  Respiratory status: acceptable  Hydration status: euvolemic  Comments: VSS on transfer to phase 2 recovery. No anesthetic complications.

## 2022-07-29 NOTE — OP NOTE
Date of Surgery: 7/29/22    Preop Dx:  Dysphagia    Postop Dx:  Same    Procedure:  Robotic Gastrostomy Tube Insertion    Surgeon:  Arnaud Mayen    Assistant:      Anesthesia:  GNTA    EBL:   <50ml    Specimen:  none    Complications:  none    Drains/Lines:  none    Indications:  59 yo with tonsillar cancer, dysphagia, malnutrition and inability to open mouth wide enough for endotracheal intubation or PEG. Description:  Patient was given adequate description of the risks and rewards of the procedure, including bleeding, infection, injury to other structurs and freely consented. He was given appropriate antibiotics and brought to the OR where GNTA anesthesia was induced. He was placed in supine position. Prepped and draped in usual sterile fashion. Proposed site of gtube marked. Right upper abdomen incision made and 5mm optiview trocar inserted. Abdomen insufflated 15mmhg pressure. Two 8mm trocars were inserted in right side and 5mm increased to 8mm. Insufflation pressure decreased to 8mm and robot docked. A gastrostomy tube was then constructed. The body of the stomach readily reached to the patient's left upper abdomen. Through the premarked spot a tonsil clamp was inserted and the 22F CHARLIE gtube brought through. The balloon was tested and it had a leak in it so a 20F CHARLIE was then inserted and did not have a leak. Two purse string sutures were placed in the anterior body of the stomach at 1 and 2cm diameters with 2-0 silk sutures. Using electrocautery at the center of these two suture lines a gastrotomy was made. The Gtube was then inserted and the balloon inflated with saline. The two suture lines were then tied down. The stomach was secured to the anterior wall of the peritoneum with four 2-0 silk sutures placed around the Gtube in a barbara technique, without injury to the balloon. The gtube was then secured at approximately 4cm. The abdomen was desufflated and robot undocked.   Trocars were removed. Gtube secured to skin with silk suture. Trocar sites closed with 3-0 vicryl subcuticular stitches. Sterile dressing placed. All suture, sponge and instrument count correct times two at end of case. Transferred to PACU in stable condition.     Pradeep Kent MD

## 2022-08-04 ENCOUNTER — OFFICE VISIT (OUTPATIENT)
Dept: SURGERY | Age: 57
End: 2022-08-04

## 2022-08-04 VITALS
BODY MASS INDEX: 25.62 KG/M2 | WEIGHT: 173 LBS | DIASTOLIC BLOOD PRESSURE: 112 MMHG | SYSTOLIC BLOOD PRESSURE: 179 MMHG | HEIGHT: 69 IN | HEART RATE: 101 BPM

## 2022-08-04 DIAGNOSIS — E44.0 MODERATE PROTEIN-CALORIE MALNUTRITION (HCC): Primary | ICD-10-CM

## 2022-08-04 PROCEDURE — 99024 POSTOP FOLLOW-UP VISIT: CPT | Performed by: SURGERY

## 2022-08-04 NOTE — PROGRESS NOTES
HPI: Nursing notes reviewed. Patient reports having bloody drainage around his gtube site. Worse when coughing, sneezing, having a bm. Some epistaxis over the weekend. No nausea. No fevers. No issues with tube feeds. ROS:  10 point review of systems performed with pertinent positives in HPI    Phys:    Abd - soft. Appropriately tender, nontdistended, gtube in place at 8cm with some serosanguinous drainage around it   Incisions - tape erythema    Assesment: 59 yo s/p robotic gtube insertion    Plan: 1. Given his circumstance of nasotracheal intubation and gtube insertion having some bloody drainage would be normal at this point postop. Also, the gtube was loose at 8cm. It was tightened to 5-6cm. Will also have follow up next week.

## 2022-08-08 ENCOUNTER — PATIENT MESSAGE (OUTPATIENT)
Dept: SURGERY | Age: 57
End: 2022-08-08

## 2022-08-08 DIAGNOSIS — R10.84 GENERALIZED ABDOMINAL PAIN: Primary | ICD-10-CM

## 2022-08-10 ENCOUNTER — HOSPITAL ENCOUNTER (OUTPATIENT)
Dept: CT IMAGING | Age: 57
Discharge: HOME | End: 2022-08-10
Payer: COMMERCIAL

## 2022-08-10 DIAGNOSIS — R10.84 GENERALIZED ABDOMINAL PAIN: ICD-10-CM

## 2022-08-10 PROCEDURE — 74176 CT ABD & PELVIS W/O CONTRAST: CPT

## 2022-08-10 PROCEDURE — 6360000004 HC RX CONTRAST MEDICATION: Performed by: SURGERY

## 2022-08-10 RX ADMIN — IOHEXOL 50 ML: 240 INJECTION, SOLUTION INTRATHECAL; INTRAVASCULAR; INTRAVENOUS; ORAL at 16:25

## 2022-08-10 NOTE — TELEPHONE ENCOUNTER
Spoke with the patient, did not complete a tube feeding last night. Attempt a tube today, however, he had immediate leakage at the entry site of the tube and skin. C/O nausea with occasional vomiting, constipated. Message forwarded to Dr. Coral Escobar via [facility].

## 2022-08-10 NOTE — TELEPHONE ENCOUNTER
Per Dr. Stephanie Liao- CT Abd/Pelvis Oral Contrast through G-tube     CT scheduled for 430 pm today with an arrival of 3 pm. NPO for the rest of the day. The patient has been notified of this information and all questions answered.

## 2022-08-29 ENCOUNTER — NURSE ONLY (OUTPATIENT)
Dept: CARDIOLOGY CLINIC | Age: 57
End: 2022-08-29

## 2022-09-02 NOTE — PROGRESS NOTES
LOCATION NAME  [facility] Fax only, no call needed - : [phone]    [facility] transmission. Transmission shows normal sensing and pacing function. EP physician will review. See interrogation under the cardiology tab in the [address] [place] [address] [address] field for more details. Will continue to monitor remotely. DC PPM implanted 6/8/22 and RA lead revision 6/9/22. PSVT recorded. ADDITIONAL NOTES  DEVICE ASSESSMENT: Available daily battery/lead measurements within expected range. ARRHYTHMIA SUMMARY: Since data last cleared on 11-Jul-2022 Based on programmed zones, device detected/treated: 1 Monitored SVT episode on 17-Jul-2022. At device classified termination of events, arrhythmia appears to continue beneath detection rate 150 bpm. See attached episode list & stored EGMs for details Recommend review of stored EGMs for rhythm determination. Device appears to be currently functioning as programmed.

## 2022-09-13 ENCOUNTER — OFFICE VISIT (OUTPATIENT)
Dept: ENT CLINIC | Age: 57
End: 2022-09-13
Payer: COMMERCIAL

## 2022-09-13 ENCOUNTER — PROCEDURE VISIT (OUTPATIENT)
Dept: SPEECH THERAPY | Age: 57
End: 2022-09-13
Payer: COMMERCIAL

## 2022-09-13 VITALS — BODY MASS INDEX: 23.79 KG/M2 | HEIGHT: 72 IN | TEMPERATURE: 97.2 F | RESPIRATION RATE: 16 BRPM

## 2022-09-13 VITALS — WEIGHT: 149.6 LBS | BODY MASS INDEX: 20.57 KG/M2

## 2022-09-13 DIAGNOSIS — Z85.818 H/O MALIGNANT NEOPLASM OF TONSIL: Primary | ICD-10-CM

## 2022-09-13 DIAGNOSIS — R25.2 TRISMUS: ICD-10-CM

## 2022-09-13 DIAGNOSIS — R13.12 OROPHARYNGEAL DYSPHAGIA: ICD-10-CM

## 2022-09-13 DIAGNOSIS — R13.12 OROPHARYNGEAL DYSPHAGIA: Primary | ICD-10-CM

## 2022-09-13 DIAGNOSIS — Z85.89 HISTORY OF HEAD AND NECK CANCER: ICD-10-CM

## 2022-09-13 PROCEDURE — 99214 OFFICE O/P EST MOD 30 MIN: CPT | Performed by: OTOLARYNGOLOGY

## 2022-09-13 PROCEDURE — 92526 ORAL FUNCTION THERAPY: CPT | Performed by: SPEECH-LANGUAGE PATHOLOGIST

## 2022-09-13 PROCEDURE — 92612 ENDOSCOPY SWALLOW (FEES) VID: CPT | Performed by: SPEECH-LANGUAGE PATHOLOGIST

## 2022-09-13 ASSESSMENT — ENCOUNTER SYMPTOMS
APNEA: 0
SHORTNESS OF BREATH: 0
EYE ITCHING: 0
SORE THROAT: 1
FACIAL SWELLING: 0
SINUS PRESSURE: 0
TROUBLE SWALLOWING: 1
COUGH: 0
VOICE CHANGE: 0

## 2022-09-13 NOTE — PROGRESS NOTES
[address], [address], [address] [place], [place] [address]s [place]  P: [phone]       Patient     Joshua Dodd  1965    ChiefComplaint     Chief Complaint   Patient presents with    Follow-up     States that the lock jaw is getting worse. 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 States he is 2 weeks out from treatment with persistent severe sore throat, worsening trismus and dysphagia. Fluids steroids are the only thing he is taking by mouth everything else is via PEG tube. When drinking water it does come out the right nostril. Ongoing significant pain. Is concerned about trismus.     Past Medical History     Past Medical History:   Diagnosis Date    CAD (coronary artery disease)     Cancer ([facility] [address]     Hypertension        Past Surgical History     Past Surgical History:   Procedure Laterality Date    STOMACH SURGERY N/A 7/29/2022    ROBOTIC GASTROSTOMY TUBE PLACEMENT performed by Zak Silva MD at [facility] OR       Family History     Family History   Problem Relation Age of Onset    Diabetes Father     Cancer Father     Heart Disease Father        Social History     Social History     Tobacco Use    Smoking status: Former     Packs/day: 0.25     Types: Cigarettes    Smokeless tobacco: Former     Types: Chew   Vaping Use    Vaping Use: Never used   Substance Use Topics    Alcohol use: Not Currently     Comment: 2 drinks a week    Drug use: Never        Allergies     Allergies   Allergen Reactions    Erythromycin Base      uncoated    Tetanus Toxoid      last injection was at age 13, got very high fever       Medications     Current Outpatient Medications   Medication Sig Dispense Refill    oxyCODONE HCl (OXYCONTIN PO) Take by mouth      Magic Mouthwash (MIRACLE MOUTHWASH) Swish and spit 5 mLs 4 times daily as needed for Irritation       No current facility-administered medications for this visit. Review of Systems     Review of Systems   Constitutional:  Negative for appetite change, chills, fatigue, fever and unexpected weight change. HENT:  Positive for sore throat and trouble swallowing. Negative for congestion, ear discharge, ear pain, facial swelling, hearing loss, nosebleeds, postnasal drip, sinus pressure, sneezing, tinnitus and voice change. Eyes:  Negative for itching. Respiratory:  Negative for apnea, cough and shortness of breath. Endocrine: Negative for cold intolerance and heat intolerance. Musculoskeletal:  Negative for myalgias and neck pain. Skin:  Negative for rash. Allergic/Immunologic: Negative for environmental allergies. Neurological:  Negative for dizziness and headaches. Psychiatric/Behavioral:  Negative for confusion, decreased concentration and sleep disturbance. PhysicalExam     Vitals:    09/13/22 1129   Resp: 16   Temp: 97.2 °F (36.2 °C)   TempSrc: Infrared   Height: 5' 11.5\" (1.816 m)       Physical Exam  Constitutional:       General: He is not in acute distress. Appearance: He is well-developed. HENT:      Head: Normocephalic and atraumatic. Jaw: Trismus (0.5cm) present. Right Ear: Tympanic membrane, ear canal and external ear normal. No drainage. No middle ear effusion. Tympanic membrane is not bulging. Tympanic membrane has normal mobility. Left Ear: Tympanic membrane, ear canal and external ear normal. No drainage. No middle ear effusion. Tympanic membrane is not bulging. Tympanic membrane has normal mobility. Nose: No mucosal edema or rhinorrhea. Mouth/Throat:      Lips: Pink. Mouth: Mucous membranes are moist.      Tongue: No lesions. Palate: No mass. Pharynx: Uvula midline.       Comments: Mucositis right retromolar trigone, right oropharynx and posterior pharyngeal wall no evidence of persistent mass  Eyes:      Pupils: Pupils are equal, round, and reactive to light. Neck:      Thyroid: No thyroid mass or thyromegaly. Trachea: Trachea and phonation normal.   Cardiovascular:      Pulses: Normal pulses. Pulmonary:      Effort: Pulmonary effort is normal. No accessory muscle usage or respiratory distress. Breath sounds: No stridor. Musculoskeletal:      Cervical back: Full passive range of motion without pain. Lymphadenopathy:      Head:      Right side of head: No submental or submandibular adenopathy. Left side of head: No submental or submandibular adenopathy. Cervical: No cervical adenopathy. Right cervical: No superficial, deep or posterior cervical adenopathy. Left cervical: No superficial, deep or posterior cervical adenopathy. Skin:     General: Skin is warm and dry. Neurological:      Mental Status: He is alert and oriented to person, place, and time. Cranial Nerves: No cranial nerve deficit. Coordination: Coordination normal.      Gait: Gait normal.   Psychiatric:         Thought Content: Thought content normal.           Assessment and Plan     1. 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 Oropharyngeal dysphagia  -Related plan for evaluation by [facility] later today    3. Trismus  -Present prior to radiation now worsening  -Plan for evaluation by [facility] later today    Significant amount of time spent in counseling with patient. I am concerned about depression or both patient and his wife both were tearful and angry intermittently throughout examination. Counseled on the high prevalence of depression during treatment of head neck cancer and the effect he can have out on outcomes.   I have encouraged him to reach out to [facility] for support group and possible. Cheng Jain DO  9/13/22      Portions of this note were dictated using Dragon.  There may be linguistic errors secondary to the use of this program.

## 2022-09-13 NOTE — PROGRESS NOTES
[facility] Ear, Nose, and Throat  SPEECH THERAPY  Fiberoptic Endoscopic Evaluation of Swallowing  (FEES)/Treatment      Name: Nicolette Sahu  YOB: 1965  Gender: male  MRN: [id number]  Referring Physician: Dr. Lisa Rosenthal; Dr. Gilford Sievert  Diagnosis: Dysphagia, unspecified  Onset Date: November '21  History of Present Illness/Injury:    Patient Active Problem List    Diagnosis Date Noted    Other dysphagia 07/29/2022    Pacemaker 06/08/2022    Sinus pause 06/06/2022    Patent foramen ovale with right to left shunt 06/01/2022    Malignant neoplasm of soft palate ([facility] [address] 06/01/2022    Moderate protein-calorie malnutrition ([facility] [address] 06/01/2022    Symptomatic bradycardia 05/31/2022     Wt Readings from Last 3 Encounters:   09/13/22 149 lb 9.6 oz (67.9 kg)   08/04/22 173 lb (78.5 kg)   07/26/22 175 lb (79.4 kg)       Date of Exam: 9/13/2022    Timeline-  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 Unchanged 4 mm solid left upper lobe pulmonary nodule bears attention on   the next imaging cycle. 2. 1.4 cm complex left upper pole renal cyst.  Recommend nonemergent CT of   the abdomen with and without contrast using renal mass protocol. Previous SLP Evaluations- NA     Patient Complaints/Reason for Referral:  Nicolette Sahu was referred for a FEES to assess the efficiency of his/her swallow function, assess for aspiration, and to make recommendations regarding safe dietary consistencies, effective compensatory strategies, and safe eating environment. SUBJECTIVE:   Pt w/ hx of R oropharyngeal CA; primary R tonsil w/ extension into nasopharynx. Treated w/ CXRT and completed treatment approx 2.5 wks ago. Greatest complaints related to severe trismus and nasal regurgitation. Per ENT report, pt w/ trismus prior to treatment d/t location/size of tumor; gradually worsening t/o treatment.  Previously was completing jaw stretches but stopped approx 5 days again d/t pain. Unable to fit spoon or toothbrush between teeth. Experiencing nasal regurgitation w/ sequential swallows; seems to dissipate w/ small, single sips. Denied difficulties related to pharyngeal swallow; has continued to consume thin liquids but receiving majority of nutrition via PEG. Has been working w/ dietician at [facility] for nutritional management. Dysphonia characterized by cul-de-sac resonance and intermittent nasal emission. Denied dyspnea. Experiencing residual fatigue. IMPRESSIONS:   Pt presents w/ moderate-severe oropharyngeal dysphagia characterized by impaired epiglottic inversion, reduced bilateral pharyngeal constriction/shortening, and incomplete airway protection secondary to XRT-related changes. 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 3x episodes of macroaspiration via interarytenoid space w/ thin liquids; reflexive cough that did clear airway. Moderate-severe vallecular residue w/ puree after initial swallow however, sequential swallows effective for clearance. Unable to complete trials >puree d/t severe trismus; unable to pass spoon/bolus between teeth. Bilateral VPI w/ presence of nasal regurgitation of thin liquids at end of evaluation. RECOMMENDATIONS:  Diet: [] NPO   [x] Alternative means of nutrition / hydration  [x] PO  Solid Consistency: Puree (pleasure feeds)  Liquid Consistency: Thin (strict aspiration precautions)  -Given ongoing pain/discomfort and trismus, pt will be unable to consume full PO diet at this time. Recommend ongoing tube feeds as instructed by dietician and will begin to monitor for transition when pt is further out from treatment.      Compensatory Techniques: Effortful swallow; secondary swallow; liquid wash  Positioning/Supervision: Upright; remain upright 30-45 min after meals  Referral to: [x] SLP (Dysphagia Tx)   [] ENT  [] GI    ORAL MOTOR EXAMINATION:  Alert : [x] Yes  [] No    Functional Comm : [x] intact  [] impaired [] absent  Voice Quality : [x] normal  [] hoarse  [] wet  [] aphonic  [] breathy  [] Strained  -Hypophonic d/t reduced jaw opening    Tongue Range of Motion :  [x] intact  [] impaired [] absent    Tongue Strength :  [] intact  [x] impaired [] absent   Velopharyngeal competence : [] intact  [x] impaired [] absent   Gag : LEFT: [] intact  [] impaired [] absent     RIGHT: [] intact  [] impaired [] Absent  -ALYSSA d/t trismus  Volitional Cough : [x] intact  [] impaired [] absent   Volitional Swallow : [x] intact  [] impaired [] absent   Spontaneous Swallow : [x] intact  [] impaired [] absent     ENDOSCOPIC EXAMINATION:  Vocal Fold adduction : [x] Complete  [] Incomplete   Interarytenoid/Post-com Edema : [x] Yes  [] No   Arytenoid Edema : [x] Yes  [] No   Arytenoid Erythema : [x] Yes  [] No   Vocal Fold Edema : [x] Yes  [] No   Pharyngeal Squeeze : LEFT: [] intact  [x] impaired [] absent      RIGHT:[] intact  [x] impaired [] absent    SWALLOWING EVALUATION:  Testing position :[x] chair, upright  [] chair, 45 degrees  [] bed, upright      [] bed, 45 degrees [] fully upright  Baseline Pooling of Secretions : [x] Yes  [] No   Baseline Penetration of Secretions : [] Yes  [x] No   Baseline Aspiration of Secretions : [] Yes  [x] No  Backflow of Secretions : [] Yes  [x] No    CONSISTENCIES TRIALED :   Thin Nectar Puree [employer] - 1/2 peach   Spillage [] Yes [x] No [] Yes  [x] No [] Yes  [x] No [] Yes  [x] No     Laryngeal  Penetration  Able to clear? [x] Yes [] No    [] Yes [x] No  -Persistent penetration d/t spillage over from UES 2/2 edema   [x] Yes  [] No    [x] Yes  [] No [] Yes  [x] No    [] Yes  [] No [] Yes  [x] No    [] Yes  [] No   Pharyngeal Residue  Able to clear?  [x] Yes [] No    [x] Yes [] No [x] Yes  [] No    [x] Yes  [] No [x] Yes  [] No    [x] Yes  [] No [] Yes  [x] No    [] Yes  [] No Pharyngeal Pooling  Able to clear? [x] Yes [] No    [x] Yes [] No [] Yes  [x] No    [] Yes  [] No [] Yes  [x] No    [] Yes  [] No [] Yes  [x] No    [] Yes  [] No   Aspiration    Response    Able to clear? [x] Yes [] No    []Silent [x]Cough    [x] Yes  [] No [] Yes   [x] No    []Silent []Cough    [] Yes  [] No [] Yes  [x] No    []Silent []Cough    [] Yes  [] No [] Yes  [x] No    []Silent []Cough    [] Yes  [] No   Bolus Backflow at UES  Able to clear? [] Yes  [x] No      [] Yes  [] No [] Yes  [x] No      [] Yes  [] No [] Yes  [x] No      [] Yes  [] No [] Yes  [x] No      [] Yes  [] No        R oropharynx site of tumor; healing tissue         Adduction     Abduction; internal lymphedema           Post-thin liquids; spill over from interarytenoid space     Post-puree initial swallow            Puree post-secondary swallow     End of evaluation              Aspiration    Nasal regurgitation; VPI      COMPENSATORY TECHNIQUES FOR INCREASED SAFETY:  Postural Changes : [] Yes  [x] No   Comments: NA- chin tuck non-effective for reduction of residue/aspiration    INCREASED RISK OF ASPIRATION DUE TO:  Poor Oral control and/or copious oral residue : [] Yes  [x] No   Redcued laryngopharyngeal sensation : [] Yes  [x] No  Premature spillage of bolus : [] Yes  [x] No  Inability to clear material from valleculae, pharynx, pyriform sinus or endolarynx : [x] Yes  [] No   Backflow to Pharynx : [x] Yes  [] No       Endoscope was removed without incident, no adverse reactions.     PENETRATION-ASPIRATION SCALE (PAS)  [] 8 Material enters the airway, passes below the vocal folds, and no effort is made to eject  [] 7 Material enters the airway, passes below the vocal folds, and is not ejected from the trachea despite effort  [x] 6 Material enters the airway, passes below the vocal folds, and is ejected into the larynx or out of the airway  [] 5 Material enters the airway, contacts the vocal folds, and is not ejected into the airway  [] 4 Material enters the airway, contacts the vocal folds, and is ejected from the airway  [] 3 Material enters the airway, remains above the vocal folds, and is not ejected from airway  [] 2 Material enters the airway, remains above the vocal folds, and is ejected from airway  [] 1 Material does not enter the airway    Dysphagia Treatment Goals & Education  Goals Session #1   Patient will demonstrate understanding of   -impact of CRT or RT on swallowing function  - importance of swallowing rehab during treatment  - importance of maintaining oral intake  - importance of oral care Pt approx 2.5 wks out from completion of treatment; educated to long-term side effects, including xerostomia, radiation fibrosis (trismus), and thickened sputum. Discussed importance of acute dysphagia tx to begin rehabilitation of swallow along w/ decrease presence of internal lymphedema (known to cause pain and difficulties swallowing). Pt expressed understanding. Patient will demonstrate comprehension of presence of dysphagia, risks associated with penetration/ aspiration, and need to adhere to management recommendations Reviewed results of FEES w/ pt and spouse; educated to presence of XRT-related changes and impairments, including aspiration w/ thin liquids. For QOL purposes, pt will continue thin liquids at this time. Pt is considered LOW risk for PNA as pt is mobile, has no additional disease process', and completes daily oral care. Patient will attempt to meet the goals as discussed and agreed upon with the patient over the next 12 weeks. Pt endorsed motivation for compliance over the next few weeks. Expressed understanding that physiology will most likely never be \"baseline\" but improvements can be made. Pt reported goal of resuming full PO diet and removal of PEG.      Patient will demonstrate ability to utilize compensatory strategies  to reduce risk of penetration/aspiration with 90% accuracy, independently. slow rate   small bolus size   effortful/double swallow   solid/liquid alternation   elimination of environmental distractions    upright posture    Reviewed compensations to begin pleasure feeds at home w/ puree consistencies; encouraged to take small (1/2 tsp) bites and suck bolus from spoon d/t trismus. Encouraged to complete effortful swallow and swallow second time as needed. Expressed understanding. Patient will maintain nutrition/hydration via tube feeding with pleasure feeds for oral enjoyment to enhance patient's quality of life. Begin pleasure feeds for both exercise and enjoyment; encouraged to attempt 1/2 cup of applesauce (or additional puree consistency) at each \"meal\" w/ goal of two cups daily. Increase as able/tolerated. Patient will maintain a clean oral cavity with 90 to 100% accuracy. Given aspiration on examination, stressed importance of adequate oral care; pt endorsed brushing teeth 2-3x daily and encouraged to continue. Experience mucositis in posterior oropharynx; will begin baking soda + salt rinse every hour to decrease bacterial build-up. Patient will complete exercises for improved posterior pharyngeal wall movement x 25 reps independently and accurately Will begin completing effortful swallow w/ all PO intake; goal of >50 daily. Patient will improve jaw range of motion to facilitate better placement or food in the oral cavity when drinking and eating. Jaw opening measured at 7 cm (average >50). Initiated use of tongue depressors to begin slowly stretching palatopharyngeus muscle; able to place x5 w/ pain level 4/10. Will begin completing 30 sec holds, 6x daily. Jaw opening measured at 11 cm after completion. Spouse endorsed using heat on TMJ; educated that this will not cause additional harm, and will use before/after stretching to assist w/ flexibility and blood flow.         SLP recommended: Yes  Barriers to treatment: None  Potential benefits from rehab include: Improved swallow physiology  Frequency: 4 sessions over 6-8/wk  Prognosis is: Good    RECOMMENDATIONS:   Dr. Johanna Resendiz was present and reviewed recorded evaluation to assist in diagnosis and provide assessment and plan for treatment. Follow good hygiene behaviors and precautions, including increasing oral care and hydration. Follow dietary precautions and behavioral lifestyle changes regarding laryngopharyngeal reflux, including taking PPI as prescribed by physician. Dysphagia therapy to focus on re-strengthening and balancing the pharyngeal/laryngeal musculature, to increase efficiency and safety of swallow function, and decrease possible comorbidities. Pt is a good candidate for further medical evaluation/intervention at the discretion of the treating physician. Pt to follow up with ENT/Dr. Johanna Resendiz.       CPT Code Units Billed Time Billed Today Date of POC Start Re-Certification Date Referring Provider   [phone] Unit Time in: 1500  Time out: 9862  Total time: 65 min 9/13/2022 60 days Dr. Johanna Resendiz     Thank you,    Latisha Kay) [place], [place], [address] [facility]; [id number]  Speech-Language Pathologist

## 2022-09-27 ENCOUNTER — PROCEDURE VISIT (OUTPATIENT)
Dept: SPEECH THERAPY | Age: 57
End: 2022-09-27
Payer: COMMERCIAL

## 2022-09-27 VITALS — BODY MASS INDEX: 20.46 KG/M2 | WEIGHT: 148.8 LBS

## 2022-09-27 DIAGNOSIS — R13.12 OROPHARYNGEAL DYSPHAGIA: Primary | ICD-10-CM

## 2022-09-27 DIAGNOSIS — Z85.89 HISTORY OF HEAD AND NECK CANCER: ICD-10-CM

## 2022-09-27 DIAGNOSIS — L03.211 CELLULITIS OF FACE: Primary | ICD-10-CM

## 2022-09-27 PROCEDURE — 92526 ORAL FUNCTION THERAPY: CPT | Performed by: SPEECH-LANGUAGE PATHOLOGIST

## 2022-09-27 RX ORDER — PREDNISOLONE 15 MG/5ML
30 SOLUTION ORAL DAILY
Qty: 50 ML | Refills: 0 | Status: SHIPPED | OUTPATIENT
Start: 2022-09-27 | End: 2022-10-05 | Stop reason: SDUPTHER

## 2022-09-27 RX ORDER — AMOXICILLIN AND CLAVULANATE POTASSIUM 600; 42.9 MG/5ML; MG/5ML
600 POWDER, FOR SUSPENSION ORAL 2 TIMES DAILY
Qty: 100 ML | Refills: 0 | Status: SHIPPED | OUTPATIENT
Start: 2022-09-27 | End: 2022-10-07

## 2022-09-27 NOTE — PROGRESS NOTES
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 Treated w/ CXRT and completed treatment approx 2.5 wks ago. Greatest complaints related to severe trismus and nasal regurgitation. Per ENT report, pt w/ trismus prior to treatment d/t location/size of tumor; gradually worsening t/o treatment. Previously was completing jaw stretches but stopped approx 5 days again d/t pain. Unable to fit spoon or toothbrush between teeth. Experiencing nasal regurgitation w/ sequential swallows; seems to dissipate w/ small, single sips. Denied difficulties related to pharyngeal swallow; has continued to consume thin liquids but receiving majority of nutrition via PEG. Has been working w/ dietician at [facility] for nutritional management. Dysphonia characterized by cul-de-sac resonance and intermittent nasal emission. Denied dyspnea. Experiencing residual fatigue. Previous SLP Evaluations: 9/13/22  Pt presents w/ moderate-severe oropharyngeal dysphagia characterized by impaired epiglottic inversion, reduced bilateral pharyngeal constriction/shortening, and incomplete airway protection secondary to XRT-related changes. 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 3x episodes of macroaspiration via interarytenoid space w/ thin liquids; reflexive cough that did clear airway. Moderate-severe vallecular residue w/ puree after initial swallow however, sequential swallows effective for clearance. Unable to complete trials >puree d/t severe trismus; unable to pass spoon/bolus between teeth.  Bilateral VPI w/ presence of nasal regurgitation of thin liquids at end of evaluation. SUBJECTIVE:   Pt reported worsening of symptoms over the last 2-3 days; unable to fit >3 tongue depressors between jaw w/o pain level 8/10. Experiencing otalgia on R-side and noted to have red discoloration w/ swelling on R cheek. Working on increasing tube feeds for additional nutrition. Weight:   Wt Readings from Last 3 Encounters:   09/27/22 148 lb 12.8 oz (67.5 kg)   09/13/22 149 lb 9.6 oz (67.9 kg)   08/04/22 173 lb (78.5 kg)     OBJECTIVE:   Dysphagia Therapy    Goals Session #1 Session #2   Patient will demonstrate understanding of   -impact of CRT or RT on swallowing function  - importance of swallowing rehab during treatment  - importance of maintaining oral intake  - importance of oral care Pt approx 2.5 wks out from completion of treatment; educated to long-term side effects, including xerostomia, radiation fibrosis (trismus), and thickened sputum. Discussed importance of acute dysphagia tx to begin rehabilitation of swallow along w/ decrease presence of internal lymphedema (known to cause pain and difficulties swallowing). Pt expressed understanding. Ongoing discussion regarding direct impact of XRT on current symptoms, including fibrosis and VPI. Discussed significant trismus and unknown prognosis, as trismus present prior to XRT d/t tumor site; now worsening post-treatment. Educated to fibrosis of musculature and attempt to improve therapeutically prior to surgical.        Patient will demonstrate comprehension of presence of dysphagia, risks associated with penetration/ aspiration, and need to adhere to management recommendations Reviewed results of FEES w/ pt and spouse; educated to presence of XRT-related changes and impairments, including aspiration w/ thin liquids. For QOL purposes, pt will continue thin liquids at this time.  Pt is considered LOW risk for PNA as pt is mobile, has no additional disease process', and consuming thin liquids + puree 1x daily; pudding is too thick and sticks in oral cavity d/t mucus. Able to consume applesauce and jello w/o difficulties. Wife reported attempting to increase tube feeds to >3000 calories for weight gain; encouraged to continue discussion w/ dietician. Patient will maintain a clean oral cavity with 90 to 100% accuracy. Given aspiration on examination, stressed importance of adequate oral care; pt endorsed brushing teeth 2-3x daily and encouraged to continue. Experience mucositis in posterior oropharynx; will begin baking soda + salt rinse every hour to decrease bacterial build-up. Discussed use of toothettes w/ hydrogen peroxide and toothpaste to begin cleaning intraoral cavity until able to fit proper toothbrush. Educated that toothette will not clear all bacteria. Using baking soda + salt rinse >12x daily; does feel decrease in thick, sticky, mucus     Patient will complete exercises for improved posterior pharyngeal wall movement x 25 reps independently and accurately Will begin completing effortful swallow w/ all PO intake; goal of >50 daily. Reported completing daily via liquids/puree/secretions. Introduced to [employer] (powering [employer]) x10  Able to complete w/o difficulties and endorsed pulling sensation on BOT      Patient will improve jaw range of motion to facilitate better placement or food in the oral cavity when drinking and eating. Jaw opening measured at 7 cm (average >50). Initiated use of tongue depressors to begin slowly stretching palatopharyngeus muscle; able to place x5 w/ pain level 4/10. Will begin completing 30 sec holds, 6x daily. Jaw opening measured at 11 cm after completion. Spouse endorsed using heat on TMJ; educated that this will not cause additional harm, and will use before/after stretching to assist w/ flexibility and blood flow. Jaw opening measured at 5 cm (average >50).     Able to place 2 tongue depressors between teeth this date. Reported increased pain/stiffness over the last 2-3 days. Given erythema and edema of cheek, concern for possible infection given difficulties related to oral care; ENT present and evaluated. Prescribed antibiotics and steroids, which may assist w/ decreasing stiffness of jaw if related to infection. ASSESSMENT:      59 y.o. male w/ hx of R oropharyngeal SCCA; treated w/ XRT July-August '22. Pt endorsed worsening symptoms over the last 2-3 days; unable to fit >3 tongue depressors between teeth, where previously at 5. Pain 8/10. Experiencing R otalgia w/ redness/swelling of R cheek. ENT concerned for infection (w/ direct impact on jaw opening) and prescribed antibiotics/steroids to see if resolution occurs. Decreased oral care d/t inability to fit toothbrush into oral cavity; discussed use of toothettes w/ hydrogen peroxide, toothpaste, and mouth wash; discussed that this will not fully rid bacteria and increased oral care recommended d/t risk of aspiration. Discussed use of heat and light massage prior to stretching to assist w/ pain; encouraged to complete bilaterally, as L side will be stiff given limited ROM. Introduced to [employer] for ongoing conditioning and movement of musculature (pharyngeal constriction); able to complete w/o difficulties. At this time, progress is limited d/t suspected infection and will continue to monitor moving forward. Prognosis remains fair w/ adherence to recommendations. Diet Recommendations:   Solid Consistency: Pureed  Liquid Consistency: Thin    RECOMMENDATIONS:      Follow HEP and RTC for ongoing dysphagia tx  2.    RTC in 2 weeks    CPT Code Units Billed Time Billed Today Date of POC Start Re-Certification Date Referring Provider   [id number] 1 Unit Time in: 1400  Time out: 1450  Total time: 50 min 9/13/22 60 days Dr. Shavonne Preciado       Thank you,    Lindell Meckel) [place], [place], [address] [facility]; [id number]  Speech-Language Pathologist

## 2022-09-30 ENCOUNTER — PATIENT MESSAGE (OUTPATIENT)
Dept: ENT CLINIC | Age: 57
End: 2022-09-30

## 2022-09-30 DIAGNOSIS — L03.211 CELLULITIS OF FACE: ICD-10-CM

## 2022-09-30 DIAGNOSIS — R25.2 TRISMUS: Primary | ICD-10-CM

## 2022-10-03 NOTE — TELEPHONE ENCOUNTER
From: Euniceppy Neighbors  To: Dr. Millicent Turner  Sent: 9/30/2022 5:41 PM EDT  Subject: Updating    Okay, been taking antibiotics and steroids. Cheek swelling down some, ear still hurts but not as much.

## 2022-10-05 RX ORDER — PREDNISOLONE 15 MG/5ML
30 SOLUTION ORAL DAILY
Qty: 50 ML | Refills: 0 | Status: SHIPPED | OUTPATIENT
Start: 2022-10-05 | End: 2022-10-10

## 2022-10-11 ENCOUNTER — OFFICE VISIT (OUTPATIENT)
Dept: ENT CLINIC | Age: 57
End: 2022-10-11
Payer: COMMERCIAL

## 2022-10-11 VITALS
BODY MASS INDEX: 20.05 KG/M2 | TEMPERATURE: 98.1 F | RESPIRATION RATE: 16 BRPM | WEIGHT: 148 LBS | HEIGHT: 72 IN | SYSTOLIC BLOOD PRESSURE: 124 MMHG | DIASTOLIC BLOOD PRESSURE: 83 MMHG | HEART RATE: 105 BPM

## 2022-10-11 DIAGNOSIS — Z85.818 H/O MALIGNANT NEOPLASM OF TONSIL: Primary | ICD-10-CM

## 2022-10-11 DIAGNOSIS — R13.12 OROPHARYNGEAL DYSPHAGIA: ICD-10-CM

## 2022-10-11 DIAGNOSIS — R25.2 TRISMUS: ICD-10-CM

## 2022-10-11 PROCEDURE — 99214 OFFICE O/P EST MOD 30 MIN: CPT | Performed by: STUDENT IN AN ORGANIZED HEALTH CARE EDUCATION/TRAINING PROGRAM

## 2022-10-11 RX ORDER — GUAIFENESIN 600 MG/1
1200 TABLET, EXTENDED RELEASE ORAL 2 TIMES DAILY
COMMUNITY

## 2022-10-11 RX ORDER — IBUPROFEN 200 MG
200 TABLET ORAL EVERY 6 HOURS PRN
COMMUNITY

## 2022-10-11 RX ORDER — PREDNISONE 10 MG/1
TABLET ORAL
Qty: 21 TABLET | Refills: 0 | Status: SHIPPED | OUTPATIENT
Start: 2022-10-11 | End: 2022-10-25

## 2022-10-11 ASSESSMENT — ENCOUNTER SYMPTOMS
SORE THROAT: 1
EYE ITCHING: 0
APNEA: 0
SINUS PRESSURE: 0
SHORTNESS OF BREATH: 0
VOICE CHANGE: 0
TROUBLE SWALLOWING: 1
COUGH: 0
FACIAL SWELLING: 0

## 2022-10-11 NOTE — PROGRESS NOTES
[facility], [address], [address], [address] [place] [address]  P: [phone]       Patient     Ashwin Wren  1965    ChiefComplaint     Chief Complaint   Patient presents with    Follow-up     Right ear pain for about 2 weeks. Pain has been increasing since Friday. Patient was previously given an antibiotic and steroid. Once medications stopped symptoms returned. Also some pain on the tongue. 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 States he is 2 weeks out from treatment with persistent severe sore throat, worsening trismus and dysphagia. Fluids steroids are the only thing he is taking by mouth everything else is via PEG tube. When drinking water it does come out the right nostril. Ongoing significant pain. Is concerned about trismus. Interval History 10/11/2022  Eduardo Valentine states that the pain and discomfort on his right ear and jaw have flared up again. He has been having relief with steroids but not with the antibiotics. The pain is unbearable.      Past Medical History     Past Medical History:   Diagnosis Date    CAD (coronary artery disease)     Cancer ([address]     Hypertension     Tinnitus        Past Surgical History     Past Surgical History:   Procedure Laterality Date    STOMACH SURGERY N/A 7/29/2022    ROBOTIC GASTROSTOMY TUBE PLACEMENT performed by Celene Councilman, MD at [address] [place] [address] Street History     Family History   Problem Relation Age of Onset    Diabetes Father     Cancer Father     Heart Disease Father        Social History     Social History     Tobacco Use    Smoking status: Former     Packs/day: 0.25     Types: Cigarettes    Smokeless tobacco: Former     Types: Chew   Vaping Use    Vaping Use: Never used   Substance Use Topics    Alcohol use: Not Currently     Comment: 2 drinks a week    Drug use: Never        Allergies     Allergies   Allergen Reactions    Erythromycin Base      uncoated    Tetanus Toxoid      last injection was at age 13, got very high fever       Medications     Current Outpatient Medications   Medication Sig Dispense Refill    ibuprofen (ADVIL) 200 MG tablet Take 200 mg by mouth every 6 hours as needed for Pain      guaiFENesin (MUCINEX) 600 MG extended release tablet Take 1,200 mg by mouth 2 times daily      predniSONE (DELTASONE) 10 MG tablet Take 2 tablets by mouth daily for 7 days, THEN 1 tablet daily for 7 days. 21 tablet 0    Magic Mouthwash (MIRACLE MOUTHWASH) Swish and spit 5 mLs 4 times daily as needed for Irritation      oxyCODONE HCl (OXYCONTIN PO) Take by mouth (Patient not taking: Reported on 10/11/2022)       No current facility-administered medications for this visit. Review of Systems     Review of Systems   Constitutional:  Negative for appetite change, chills, fatigue, fever and unexpected weight change. HENT:  Positive for sore throat and trouble swallowing. Negative for congestion, ear discharge, ear pain, facial swelling, hearing loss, nosebleeds, postnasal drip, sinus pressure, sneezing, tinnitus and voice change. Eyes:  Negative for itching. Respiratory:  Negative for apnea, cough and shortness of breath. Endocrine: Negative for cold intolerance and heat intolerance. Musculoskeletal:  Negative for myalgias and neck pain. Skin:  Negative for rash. Allergic/Immunologic: Negative for environmental allergies. Neurological:  Negative for dizziness and headaches. Psychiatric/Behavioral:  Negative for confusion, decreased concentration and sleep disturbance.         PhysicalExam     Vitals:    10/11/22 1400   BP: 124/83   Site: Left Upper Arm   Position: Sitting   Cuff Size: Medium Adult   Pulse: (!) 105   Resp: 16   Temp: 98.1 °F (36.7 °C)   TempSrc: Infrared   Weight: 148 lb (67.1 kg)   Height: 5' 11.5\" (1.816 m)       Physical Exam  Constitutional:       General: He is not in acute distress. Appearance: He is well-developed. HENT:      Head: Normocephalic and atraumatic. Jaw: Trismus (0.5cm) present. Right Ear: Tympanic membrane, ear canal and external ear normal. No drainage. No middle ear effusion. Tympanic membrane is not bulging. Tympanic membrane has normal mobility. Left Ear: Tympanic membrane, ear canal and external ear normal. No drainage. No middle ear effusion. Tympanic membrane is not bulging. Tympanic membrane has normal mobility. Nose: No mucosal edema or rhinorrhea. Mouth/Throat:      Lips: Pink. Mouth: Mucous membranes are moist.      Tongue: No lesions. Palate: No mass. Pharynx: Uvula midline. Comments: Mucositis right retromolar trigone, right oropharynx and posterior pharyngeal wall no evidence of persistent mass  Eyes:      Pupils: Pupils are equal, round, and reactive to light. Neck:      Thyroid: No thyroid mass or thyromegaly. Trachea: Trachea and phonation normal.   Cardiovascular:      Pulses: Normal pulses. Pulmonary:      Effort: Pulmonary effort is normal. No accessory muscle usage or respiratory distress. Breath sounds: No stridor. Musculoskeletal:      Cervical back: Full passive range of motion without pain. Lymphadenopathy:      Head:      Right side of head: No submental or submandibular adenopathy. Left side of head: No submental or submandibular adenopathy. Cervical: No cervical adenopathy. Right cervical: No superficial, deep or posterior cervical adenopathy. Left cervical: No superficial, deep or posterior cervical adenopathy. Skin:     General: Skin is warm and dry. Neurological:      Mental Status: He is alert and oriented to person, place, and time. Cranial Nerves: No cranial nerve deficit.       Coordination: Coordination normal.      Gait: Gait normal.   Psychiatric: Thought Content: Thought content normal.           Assessment and Plan       Roberto Post is a very pleasant 59 y.o. male with    1. H/O malignant neoplasm of tonsil  No evidence of infection today on exam however he still has significant pain. I will prescribe him 2 weeks of steroids per his request.  We did discuss the risks of long-term steroid use however he thinks that the benefits of the steroids outweigh the risks and is hoping that this will get him through his recovery. And the pain will start to subside. I think this is reasonable so we will try it. - predniSONE (DELTASONE) 10 MG tablet; Take 2 tablets by mouth daily for 7 days, THEN 1 tablet daily for 7 days. Dispense: 21 tablet; Refill: 0    2. Oropharyngeal dysphagia  He will see Dr. Manny Philip in the coming weeks    3. Trismus  He will continue to manually try to increase his jaw opening with the tongue depressors. He will follow-up with Dr. Justo Mcwilliams at the next point with Dr. Manny Philip.

## 2022-10-13 ENCOUNTER — APPOINTMENT (OUTPATIENT)
Dept: CT IMAGING | Age: 57
End: 2022-10-13
Payer: COMMERCIAL

## 2022-10-13 ENCOUNTER — HOSPITAL ENCOUNTER (EMERGENCY)
Age: 57
Discharge: HOME | End: 2022-10-13
Payer: COMMERCIAL

## 2022-10-13 VITALS
WEIGHT: 148 LBS | DIASTOLIC BLOOD PRESSURE: 89 MMHG | OXYGEN SATURATION: 98 % | TEMPERATURE: 97.8 F | BODY MASS INDEX: 20.35 KG/M2 | HEART RATE: 94 BPM | RESPIRATION RATE: 18 BRPM | SYSTOLIC BLOOD PRESSURE: 132 MMHG

## 2022-10-13 DIAGNOSIS — R04.2 HEMOPTYSIS: Primary | ICD-10-CM

## 2022-10-13 LAB
A/G RATIO: 1.1 (ref 1.1–2.2)
ALBUMIN SERPL-MCNC: 3.5 G/DL (ref 3.4–5)
ALP BLD-CCNC: 77 U/L (ref 40–129)
ALT SERPL-CCNC: 19 U/L (ref 10–40)
ANION GAP SERPL CALCULATED.3IONS-SCNC: 7 MMOL/L (ref 3–16)
AST SERPL-CCNC: 11 U/L (ref 15–37)
BASOPHILS ABSOLUTE: 0 K/UL (ref 0–0.2)
BASOPHILS RELATIVE PERCENT: 0.3 %
BILIRUB SERPL-MCNC: 0.3 MG/DL (ref 0–1)
BUN BLDV-MCNC: 40 MG/DL (ref 7–20)
CALCIUM SERPL-MCNC: 9.6 MG/DL (ref 8.3–10.6)
CHLORIDE BLD-SCNC: 93 MMOL/L (ref 99–110)
CO2: 34 MMOL/L (ref 21–32)
CREAT SERPL-MCNC: 0.8 MG/DL (ref 0.9–1.3)
EKG ATRIAL RATE: 86 BPM
EKG DIAGNOSIS: NORMAL
EKG P AXIS: 58 DEGREES
EKG P-R INTERVAL: 132 MS
EKG Q-T INTERVAL: 328 MS
EKG QRS DURATION: 78 MS
EKG QTC CALCULATION (BAZETT): 392 MS
EKG R AXIS: 62 DEGREES
EKG T AXIS: 60 DEGREES
EKG VENTRICULAR RATE: 86 BPM
EOSINOPHILS ABSOLUTE: 0.1 K/UL (ref 0–0.6)
EOSINOPHILS RELATIVE PERCENT: 0.4 %
GFR AFRICAN AMERICAN: >60
GFR NON-AFRICAN AMERICAN: >60
GLUCOSE BLD-MCNC: 132 MG/DL (ref 70–99)
HCT VFR BLD CALC: 26.5 % (ref 40.5–52.5)
HEMOGLOBIN: 8.7 G/DL (ref 13.5–17.5)
INR BLD: 0.98 (ref 0.87–1.14)
LYMPHOCYTES ABSOLUTE: 0.6 K/UL (ref 1–5.1)
LYMPHOCYTES RELATIVE PERCENT: 4.5 %
MCH RBC QN AUTO: 31.1 PG (ref 26–34)
MCHC RBC AUTO-ENTMCNC: 32.9 G/DL (ref 31–36)
MCV RBC AUTO: 94.8 FL (ref 80–100)
MONOCYTES ABSOLUTE: 1.3 K/UL (ref 0–1.3)
MONOCYTES RELATIVE PERCENT: 9.5 %
NEUTROPHILS ABSOLUTE: 11.8 K/UL (ref 1.7–7.7)
NEUTROPHILS RELATIVE PERCENT: 85.3 %
PDW BLD-RTO: 17.9 % (ref 12.4–15.4)
PLATELET # BLD: 299 K/UL (ref 135–450)
PMV BLD AUTO: 7.1 FL (ref 5–10.5)
POTASSIUM SERPL-SCNC: 4.1 MMOL/L (ref 3.5–5.1)
PROTHROMBIN TIME: 12.9 SEC (ref 11.7–14.5)
RBC # BLD: 2.79 M/UL (ref 4.2–5.9)
SODIUM BLD-SCNC: 134 MMOL/L (ref 136–145)
SPECIMEN STATUS: NORMAL
TOTAL PROTEIN: 6.8 G/DL (ref 6.4–8.2)
TROPONIN: 0.01 NG/ML
WBC # BLD: 13.8 K/UL (ref 4–11)

## 2022-10-13 PROCEDURE — 80053 COMPREHEN METABOLIC PANEL: CPT

## 2022-10-13 PROCEDURE — 99285 EMERGENCY DEPT VISIT HI MDM: CPT

## 2022-10-13 PROCEDURE — 85025 COMPLETE CBC W/AUTO DIFF WBC: CPT

## 2022-10-13 PROCEDURE — 93005 ELECTROCARDIOGRAM TRACING: CPT | Performed by: NURSE PRACTITIONER

## 2022-10-13 PROCEDURE — 74177 CT ABD & PELVIS W/CONTRAST: CPT

## 2022-10-13 PROCEDURE — 85610 PROTHROMBIN TIME: CPT

## 2022-10-13 PROCEDURE — 6360000004 HC RX CONTRAST MEDICATION: Performed by: NURSE PRACTITIONER

## 2022-10-13 PROCEDURE — 71260 CT THORAX DX C+: CPT | Performed by: NURSE PRACTITIONER

## 2022-10-13 PROCEDURE — 96360 HYDRATION IV INFUSION INIT: CPT

## 2022-10-13 PROCEDURE — 2580000003 HC RX 258: Performed by: NURSE PRACTITIONER

## 2022-10-13 PROCEDURE — 84484 ASSAY OF TROPONIN QUANT: CPT

## 2022-10-13 PROCEDURE — 96361 HYDRATE IV INFUSION ADD-ON: CPT

## 2022-10-13 RX ORDER — 0.9 % SODIUM CHLORIDE 0.9 %
1000 INTRAVENOUS SOLUTION INTRAVENOUS ONCE
Status: COMPLETED | OUTPATIENT
Start: 2022-10-13 | End: 2022-10-13

## 2022-10-13 RX ADMIN — IOPAMIDOL 75 ML: 755 INJECTION, SOLUTION INTRAVENOUS at 12:11

## 2022-10-13 RX ADMIN — SODIUM CHLORIDE 1000 ML: 9 INJECTION, SOLUTION INTRAVENOUS at 11:25

## 2022-10-13 ASSESSMENT — PAIN - FUNCTIONAL ASSESSMENT: PAIN_FUNCTIONAL_ASSESSMENT: NONE - DENIES PAIN

## 2022-10-13 NOTE — ED NOTES
Pt ambulatory with steady gait. Pt denies any lightheaded or dizziness.       Rosario Watt, VINCE  10/13/22 7336

## 2022-10-13 NOTE — ED PROVIDER NOTES
[facility] Emergency Department    CHIEF COMPLAINT  Cough (States noticed blood in sputum last night and was bleeding out of his nose last night and this AM.)      HISTORY OF PRESENT ILLNESS  Dahlia Malone is a 59 y.o. male who presents to the ED complaining of hemoptysis. Patient reports that he has a history of throat cancer and just underwent 30+ radiation treatments and chemotherapy. Patient reports since radiation he has had multiple complications such as trismus, cellulitis to the face and mucositis. Patient reports that he has experienced bloody sputum but last night he was coughing up several blood clots. Given his trismus he has difficulty getting the blood clots out and he started to see blood in his feeding tube. Patient called his oncologist who told him to come to the emergency department for evaluation today. Patient otherwise has no complaints denies fever, chest pain, shortness of breath, abdominal pain, emesis or diarrhea. Patient is not on blood thinning medications. No other complaints, modifying factors or associated symptoms. Nursing notes reviewed.    Past Medical History:   Diagnosis Date    CAD (coronary artery disease)     Cancer ([facility] [address]     Hypertension     Tinnitus      Past Surgical History:   Procedure Laterality Date    STOMACH SURGERY N/A 7/29/2022    ROBOTIC GASTROSTOMY TUBE PLACEMENT performed by Lisseth Clayton MD at [address] [place] [address],[address] [facility] History   Problem Relation Age of Onset    Diabetes Father     Cancer Father     Heart Disease Father      Social History     Socioeconomic History    Marital status:      Spouse name: Not on file    Number of children: Not on file    Years of education: Not on file    Highest education level: Not on file   Occupational History    Not on file   Tobacco Use    Smoking status: Former     Packs/day: 0.25     Types: Cigarettes    Smokeless tobacco: Former     Types: Chew   Vaping Use    Vaping Use: Never used   Substance and Sexual Activity    Alcohol use: Not Currently     Comment: 2 drinks a week    Drug use: Never    Sexual activity: Not on file   Other Topics Concern    Not on file   Social History Narrative    Not on file     Social Determinants of Health     Financial Resource Strain: Not on file   Food Insecurity: Not on file   Transportation Needs: Not on file   Physical Activity: Not on file   Stress: Not on file   Social Connections: Not on file   Intimate Partner Violence: Not on file   Housing Stability: Not on file     Current Facility-Administered Medications   Medication Dose Route Frequency Provider Last Rate Last Admin    Lip Balm ointment              Current Outpatient Medications   Medication Sig Dispense Refill    ibuprofen (ADVIL) 200 MG tablet Take 200 mg by mouth every 6 hours as needed for Pain      guaiFENesin (MUCINEX) 600 MG extended release tablet Take 1,200 mg by mouth 2 times daily      predniSONE (DELTASONE) 10 MG tablet Take 2 tablets by mouth daily for 7 days, THEN 1 tablet daily for 7 days. 21 tablet 0    oxyCODONE HCl (OXYCONTIN PO) Take by mouth (Patient not taking: Reported on 10/11/2022)      Magic Mouthwash (MIRACLE MOUTHWASH) Swish and spit 5 mLs 4 times daily as needed for Irritation       Allergies   Allergen Reactions    Erythromycin Base      uncoated    Tetanus Toxoid      last injection was at age 13, got very high fever       REVIEW OF SYSTEMS  10 systems reviewed, pertinent positives per HPI otherwise noted to be negative    PHYSICAL EXAM  /89   Pulse 94   Temp 97.8 °F (36.6 °C) (Oral)   Resp 18   Wt 148 lb (67.1 kg)   SpO2 98%   BMI 20.35 kg/m²   GENERAL APPEARANCE: Awake and alert. Cooperative. No acute distress. Vital signs are stable. Well appearing and non toxic. HEAD: Normocephalic. Atraumatic. EYES: PERRL. EOM's grossly intact. ENT: Mucous membranes are dry. Positive for trismus. NECK: Supple. Normal ROM. HEART: RRR.  Distal pulses are equal and intact. Cap refill less than 2 seconds. LUNGS: Respirations unlabored. CTAB. Good air exchange. Speaking comfortably in full sentences. No wheezing, rhonchi, rales, stridor. ABDOMEN: Soft. Non-distended. Non-tender. No guarding or rebound. No rigidity. Bowel sounds are present. Negative garcia's. Negative McBurney's point. Negative CVA tenderness. EXTREMITIES: No peripheral edema. Moves all extremities equally. All extremities neurovascularly intact. SKIN: Warm and dry. No acute rashes. NEUROLOGICAL: Alert and oriented. No gross facial drooping. Strength 5/5, sensation intact. Speech is clear. PSYCHIATRIC: Normal mood and affect. SCREENINGS       RADIOLOGY  CT ABDOMEN PELVIS W IV CONTRAST Additional Contrast? None    Result Date: 10/13/2022  EXAMINATION: CT OF THE ABDOMEN AND PELVIS WITH CONTRAST; CTA OF THE CHEST 10/13/2022 11:48 am TECHNIQUE: CT of the abdomen and pelvis was performed with the administration of intravenous contrast. Multiplanar reformatted images are provided for review. Automated exposure control, iterative reconstruction, and/or weight based adjustment of the mA/kV was utilized to reduce the radiation dose to as low as reasonably achievable.; CTA of the chest was performed after the administration of intravenous contrast.  Multiplanar reformatted images are provided for review. MIP images are provided for review. Automated exposure control, iterative reconstruction, and/or weight based adjustment of the mA/kV was utilized to reduce the radiation dose to as low as reasonably achievable.  COMPARISON: 08/10/2022 and 06/23/2022 HISTORY: ORDERING SYSTEM PROVIDED HISTORY: coughing up blood, blood also in feeding tube TECHNOLOGIST PROVIDED HISTORY: Reason for exam:->coughing up blood, blood also in feeding tube Additional Contrast?->None Decision Support Exception - unselect if not a suspected or confirmed emergency medical condition->Emergency Medical Condition (MA) Reason for Exam: constipation,  pt just finished chemo and rad Tx's for skin CVA in the throat' Relevant Medical/Surgical History: recent [place] tx\"s , CAD with pacemaker insertion; ORDERING SYSTEM PROVIDED HISTORY: coughing up blood TECHNOLOGIST PROVIDED HISTORY: Reason for exam:->coughing up blood Decision Support Exception - unselect if not a suspected or confirmed emergency medical condition->Emergency Medical Condition (MA) Reason for Exam: pt started couhhing up blood last night. large clots this AM.  pt did just recently initiate solid foods. pt states no CP or SOB Relevant Medical/Surgical History: recent chemo and RAD TX's to the throat, CAD, pacemaker insertion FINDINGS: Chest: Mediastinum: The central airways are patent. There is no hilar or mediastinal adenopathy. The central pulmonary arterial vessels enhance normally without intraluminal filling defect. The aorta, heart and pericardium are without acute abnormality. Lungs/pleura: There is no consolidation, pneumothorax or effusion. Soft Tissues/Bones: There is no acute fracture or aggressive osseous lesion. Abdomen/Pelvis: Organs: The liver, pancreas, spleen, kidneys and adrenals are unremarkable. GI/Bowel: A feeding gastrostomy tube is centrally located within the gastric body. There is no bowel dilatation, wall thickening or obstruction. The appendix is normal. Pelvis: The bladder and prostate are unremarkable. Peritoneum/Retroperitoneum: There is no free air, free fluid or intraperitoneal inflammatory change. There is no adenopathy. Bones/Soft Tissues: There is no acute fracture or aggressive osseous lesion. 1. Negative for pulmonary embolus. 2. No acute abdominopelvic abnormality.      CT CHEST PULMONARY EMBOLISM W CONTRAST    Result Date: 10/13/2022  EXAMINATION: CT OF THE ABDOMEN AND PELVIS WITH CONTRAST; CTA OF THE CHEST 10/13/2022 11:48 am TECHNIQUE: CT of the abdomen and pelvis was performed with the administration of intravenous contrast. Multiplanar reformatted images are provided for review. Automated exposure control, iterative reconstruction, and/or weight based adjustment of the mA/kV was utilized to reduce the radiation dose to as low as reasonably achievable.; CTA of the chest was performed after the administration of intravenous contrast.  Multiplanar reformatted images are provided for review. MIP images are provided for review. Automated exposure control, iterative reconstruction, and/or weight based adjustment of the mA/kV was utilized to reduce the radiation dose to as low as reasonably achievable. COMPARISON: 08/10/2022 and 06/23/2022 HISTORY: ORDERING SYSTEM PROVIDED HISTORY: coughing up blood, blood also in feeding tube TECHNOLOGIST PROVIDED HISTORY: Reason for exam:->coughing up blood, blood also in feeding tube Additional Contrast?->None Decision Support Exception - unselect if not a suspected or confirmed emergency medical condition->Emergency Medical Condition (MA) Reason for Exam: constipation,  pt just finished chemo and rad Tx's for skin CVA in the throat' Relevant Medical/Surgical History: recent CA tx\"s , CAD with pacemaker insertion; ORDERING SYSTEM PROVIDED HISTORY: coughing up blood TECHNOLOGIST PROVIDED HISTORY: Reason for exam:->coughing up blood Decision Support Exception - unselect if not a suspected or confirmed emergency medical condition->Emergency Medical Condition (MA) Reason for Exam: pt started couhhing up blood last night. large clots this AM.  pt did just recently initiate solid foods. pt states no CP or SOB Relevant Medical/Surgical History: recent chemo and RAD TX's to the throat, CAD, pacemaker insertion FINDINGS: Chest: Mediastinum: The central airways are patent. There is no hilar or mediastinal adenopathy. The central pulmonary arterial vessels enhance normally without intraluminal filling defect. The aorta, heart and pericardium are without acute abnormality. Lungs/pleura:  There is no consolidation, pneumothorax or effusion. Soft Tissues/Bones: There is no acute fracture or aggressive osseous lesion. Abdomen/Pelvis: Organs: The liver, pancreas, spleen, kidneys and adrenals are unremarkable. GI/Bowel: A feeding gastrostomy tube is centrally located within the gastric body. There is no bowel dilatation, wall thickening or obstruction. The appendix is normal. Pelvis: The bladder and prostate are unremarkable. Peritoneum/Retroperitoneum: There is no free air, free fluid or intraperitoneal inflammatory change. There is no adenopathy. Bones/Soft Tissues: There is no acute fracture or aggressive osseous lesion. 1. Negative for pulmonary embolus. 2. No acute abdominopelvic abnormality. CONSULTS  IP CONSULT TO ONCOLOGY  IP CONSULT TO OTOLARYNGOLOGY    ED COURSE/MDM  Patient seen and evaluated. Old records reviewed. Diagnostic testing reviewed and results discussed. I have independently evaluated this patient based upon my scope of practice. Supervising physician was in the department for consultation as needed. Tatiana Cotter presented to the ED today with above noted complaints. Upon arrival to the emergency department vital signs are stable. Nontoxic appearance. No active hemoptysis during emergency department course. Blood work hemoglobin and hematocrit are stable, but do show a anemia with a hemoglobin of 8.7 and hematocrit of 26.5. Platelets normal at 217. Mild leukocytosis of 13.8. No FINN. No electrolyte abnormality. No transaminitis INR 0.98. CT of the chest abdomen and pelvis negative for pulmonary embolus or acute abdominal pelvic abnormality. I spoke with patient's oncologist Harrison Kowalski who recommends calling ENT. Unfortunately patient was not willing to wait for ENT to call back and giving reassuring emergency department work-up and no return of hemoptysis patient was discharged with ENT referral to follow-up with outpatient.   And given that patient is Neutrophils % 85.3 %    Lymphocytes % 4.5 %    Monocytes % 9.5 %    Eosinophils % 0.4 %    Basophils % 0.3 %    Neutrophils Absolute 11.8 (H) 1.7 - 7.7 K/uL    Lymphocytes Absolute 0.6 (L) 1.0 - 5.1 K/uL    Monocytes Absolute 1.3 0.0 - 1.3 K/uL    Eosinophils Absolute 0.1 0.0 - 0.6 K/uL    Basophils Absolute 0.0 0.0 - 0.2 K/uL   Comprehensive Metabolic Panel   Result Value Ref Range    Sodium 134 (L) 136 - 145 mmol/L    Potassium 4.1 3.5 - 5.1 mmol/L    Chloride 93 (L) 99 - 110 mmol/L    CO2 34 (H) 21 - 32 mmol/L    Anion Gap 7 3 - 16    Glucose 132 (H) 70 - 99 mg/dL    BUN 40 (H) 7 - 20 mg/dL    Creatinine 0.8 (L) 0.9 - 1.3 mg/dL    GFR Non-African American >60 >60    GFR African American >60 >60    Calcium 9.6 8.3 - 10.6 mg/dL    Total Protein 6.8 6.4 - 8.2 g/dL    Albumin 3.5 3.4 - 5.0 g/dL    Albumin/Globulin Ratio 1.1 1.1 - 2.2    Total Bilirubin 0.3 0.0 - 1.0 mg/dL    Alkaline Phosphatase 77 40 - 129 U/L    ALT 19 10 - 40 U/L    AST 11 (L) 15 - 37 U/L   Protime-INR   Result Value Ref Range    Protime 12.9 11.7 - 14.5 sec    INR 0.98 0.87 - 1.14   Troponin   Result Value Ref Range    Troponin 0.01 <0.01 ng/mL   Sample possible blood bank testing   Result Value Ref Range    Specimen Status CHRISTY    EKG 12 Lead   Result Value Ref Range    Ventricular Rate 86 BPM    Atrial Rate 86 BPM    P-R Interval 132 ms    QRS Duration 78 ms    Q-T Interval 328 ms    QTc Calculation (Bazett) 392 ms    P Axis 58 degrees    R Axis 62 degrees    T Axis 60 degrees    Diagnosis       Normal sinus rhythmNormal ECGWhen compared with ECG of 08-JUN-2022 17:36,Sinus rhythm has replaced Electronic ventricular pacemaker       I estimate there is LOW risk for PULMONARY EMBOLISM, ACUTE CORONARY SYNDROME, OR THORACIC AORTIC DISSECTION, thus I consider the discharge disposition reasonable. Tarri Yu and I have discussed the diagnosis and risks, and we agree with discharging home to follow-up with their primary doctor.  We also discussed returning to the Emergency Department immediately if new or worsening symptoms occur. We have discussed the symptoms which are most concerning (e.g., bloody sputum, fever, worsening pain or shortness of breath, vomiting) that necessitate immediate return. FINAL Impression    1. Hemoptysis        Blood pressure 132/89, pulse 94, temperature 97.8 °F (36.6 °C), temperature source Oral, resp. rate 18, weight 148 lb (67.1 kg), SpO2 98 %. mdm    Patient was sent home with a prescription for below medication/s. I did [place] patient on appropriate use of these medication. Discharge Medication List as of 10/13/2022  3:08 PM              FOLLOW UP  Elanmedhat Venegas, DO  [address] [facility]  [id number] [facility]  [phone]          [facility]  ED  [address] [facility] [address] [phone]          DISPOSITION  Patient was discharged to home in good condition. Comment: Please note this report has been produced using speech recognition software and may contain errors related to that system including errors in grammar, punctuation, and spelling, as well as words and phrases that may be inappropriate. If there are any questions or concerns please feel free to contact the dictating provider for clarification.            RAMON Molina CNP  10/13/22 [id number] RAMON Whitmore - AJIT  10/19/22 5408

## 2022-10-13 NOTE — ED NOTES
Pt arrives for concern for coughing up blood starting since last night. Pt denies any new or worsening pain. Pt denies any feelings of nausea. Pt states hx throat cancer and had radiation most recently 6 weeks ago. Pt alert and oriented. Pt ambulatory with steady gait. See flowsheets for vital signs.       Nic Patiño RN  10/13/22 1923

## 2022-10-13 NOTE — ED NOTES
--Patient provided with discharge instructions and any prescriptions. --Instructions, dosing, and follow-up appointments reviewed with patient/family. No further questions or needs at this time. --Vital signs and patient stable upon discharge. --Patient ambulatory to lobby with wife.       Max Bradley RN  10/13/22 3969

## 2022-10-13 NOTE — ED PROVIDER NOTES
The Ekg interpreted by me shows  normal sinus rhythm with a rate of 86  Axis is   Normal  QTc is  normal  Intervals and Durations are unremarkable.       ST Segments: normal  Sinus rhythm has replaced paced rhythm compared to previous performed 6/8/2022         Justyna Sahu MD  10/13/22 0954

## 2022-10-19 ENCOUNTER — HOSPITAL ENCOUNTER (EMERGENCY)
Age: 57
Discharge: ANOTHER HOSPITAL | End: 2022-10-19
Attending: EMERGENCY MEDICINE
Payer: COMMERCIAL

## 2022-10-19 ENCOUNTER — APPOINTMENT (OUTPATIENT)
Dept: CT IMAGING | Age: 57
End: 2022-10-19
Payer: COMMERCIAL

## 2022-10-19 VITALS
TEMPERATURE: 98.1 F | RESPIRATION RATE: 18 BRPM | DIASTOLIC BLOOD PRESSURE: 88 MMHG | HEART RATE: 105 BPM | OXYGEN SATURATION: 96 % | SYSTOLIC BLOOD PRESSURE: 114 MMHG

## 2022-10-19 DIAGNOSIS — R04.1 HEMORRHAGE FROM THROAT: Primary | ICD-10-CM

## 2022-10-19 DIAGNOSIS — D62 ACUTE BLOOD LOSS ANEMIA: ICD-10-CM

## 2022-10-19 LAB
ABO/RH: NORMAL
ANION GAP SERPL CALCULATED.3IONS-SCNC: 15 MMOL/L (ref 3–16)
ANTIBODY SCREEN: NORMAL
APTT: 24.1 SEC (ref 23–34.3)
BASOPHILS ABSOLUTE: 0.2 K/UL (ref 0–0.2)
BASOPHILS RELATIVE PERCENT: 1.1 %
BLOOD BANK DISPENSE STATUS: NORMAL
BLOOD BANK PRODUCT CODE: NORMAL
BPU ID: NORMAL
BUN BLDV-MCNC: 28 MG/DL (ref 7–20)
CALCIUM SERPL-MCNC: 9.4 MG/DL (ref 8.3–10.6)
CHLORIDE BLD-SCNC: 90 MMOL/L (ref 99–110)
CO2: 27 MMOL/L (ref 21–32)
CREAT SERPL-MCNC: 0.9 MG/DL (ref 0.9–1.3)
DESCRIPTION BLOOD BANK: NORMAL
EKG ATRIAL RATE: 119 BPM
EKG DIAGNOSIS: NORMAL
EKG P AXIS: 54 DEGREES
EKG P-R INTERVAL: 120 MS
EKG Q-T INTERVAL: 310 MS
EKG QRS DURATION: 78 MS
EKG QTC CALCULATION (BAZETT): 436 MS
EKG R AXIS: 68 DEGREES
EKG T AXIS: 53 DEGREES
EKG VENTRICULAR RATE: 119 BPM
EOSINOPHILS ABSOLUTE: 0 K/UL (ref 0–0.6)
EOSINOPHILS RELATIVE PERCENT: 0.3 %
GFR SERPL CREATININE-BSD FRML MDRD: >60 ML/MIN/{1.73_M2}
GLUCOSE BLD-MCNC: 217 MG/DL (ref 70–99)
HCT VFR BLD CALC: 22.2 % (ref 40.5–52.5)
HEMOGLOBIN: 7.2 G/DL (ref 13.5–17.5)
INR BLD: 1.08 (ref 0.87–1.14)
LACTIC ACID: 3.9 MMOL/L (ref 0.4–2)
LYMPHOCYTES ABSOLUTE: 0.4 K/UL (ref 1–5.1)
LYMPHOCYTES RELATIVE PERCENT: 2.7 %
MCH RBC QN AUTO: 31.3 PG (ref 26–34)
MCHC RBC AUTO-ENTMCNC: 32.5 G/DL (ref 31–36)
MCV RBC AUTO: 96.3 FL (ref 80–100)
MONOCYTES ABSOLUTE: 1.3 K/UL (ref 0–1.3)
MONOCYTES RELATIVE PERCENT: 8.2 %
NEUTROPHILS ABSOLUTE: 14.3 K/UL (ref 1.7–7.7)
NEUTROPHILS RELATIVE PERCENT: 87.7 %
PDW BLD-RTO: 17.3 % (ref 12.4–15.4)
PLATELET # BLD: 357 K/UL (ref 135–450)
PMV BLD AUTO: 6.9 FL (ref 5–10.5)
POTASSIUM REFLEX MAGNESIUM: 4.2 MMOL/L (ref 3.5–5.1)
PROTHROMBIN TIME: 13.9 SEC (ref 11.7–14.5)
RBC # BLD: 2.3 M/UL (ref 4.2–5.9)
SODIUM BLD-SCNC: 132 MMOL/L (ref 136–145)
WBC # BLD: 16.4 K/UL (ref 4–11)

## 2022-10-19 PROCEDURE — P9016 RBC LEUKOCYTES REDUCED: HCPCS

## 2022-10-19 PROCEDURE — 36430 TRANSFUSION BLD/BLD COMPNT: CPT

## 2022-10-19 PROCEDURE — 2580000003 HC RX 258: Performed by: EMERGENCY MEDICINE

## 2022-10-19 PROCEDURE — 70491 CT SOFT TISSUE NECK W/DYE: CPT

## 2022-10-19 PROCEDURE — 86850 RBC ANTIBODY SCREEN: CPT

## 2022-10-19 PROCEDURE — 85610 PROTHROMBIN TIME: CPT

## 2022-10-19 PROCEDURE — 86923 COMPATIBILITY TEST ELECTRIC: CPT

## 2022-10-19 PROCEDURE — 99024 POSTOP FOLLOW-UP VISIT: CPT | Performed by: OTOLARYNGOLOGY

## 2022-10-19 PROCEDURE — 86901 BLOOD TYPING SEROLOGIC RH(D): CPT

## 2022-10-19 PROCEDURE — 2500000003 HC RX 250 WO HCPCS: Performed by: EMERGENCY MEDICINE

## 2022-10-19 PROCEDURE — 83605 ASSAY OF LACTIC ACID: CPT

## 2022-10-19 PROCEDURE — 96374 THER/PROPH/DIAG INJ IV PUSH: CPT

## 2022-10-19 PROCEDURE — 99285 EMERGENCY DEPT VISIT HI MDM: CPT

## 2022-10-19 PROCEDURE — 93005 ELECTROCARDIOGRAM TRACING: CPT | Performed by: EMERGENCY MEDICINE

## 2022-10-19 PROCEDURE — 86900 BLOOD TYPING SEROLOGIC ABO: CPT

## 2022-10-19 PROCEDURE — 70450 CT HEAD/BRAIN W/O DYE: CPT

## 2022-10-19 PROCEDURE — 85730 THROMBOPLASTIN TIME PARTIAL: CPT

## 2022-10-19 PROCEDURE — 85025 COMPLETE CBC W/AUTO DIFF WBC: CPT

## 2022-10-19 PROCEDURE — 6360000004 HC RX CONTRAST MEDICATION: Performed by: EMERGENCY MEDICINE

## 2022-10-19 PROCEDURE — 80048 BASIC METABOLIC PNL TOTAL CA: CPT

## 2022-10-19 RX ORDER — TRANEXAMIC ACID 100 MG/ML
15 INJECTION, SOLUTION INTRAVENOUS ONCE
Status: COMPLETED | OUTPATIENT
Start: 2022-10-19 | End: 2022-10-19

## 2022-10-19 RX ORDER — SODIUM CHLORIDE 9 MG/ML
INJECTION, SOLUTION INTRAVENOUS PRN
Status: DISCONTINUED | OUTPATIENT
Start: 2022-10-19 | End: 2022-10-19 | Stop reason: HOSPADM

## 2022-10-19 RX ORDER — 0.9 % SODIUM CHLORIDE 0.9 %
1000 INTRAVENOUS SOLUTION INTRAVENOUS ONCE
Status: COMPLETED | OUTPATIENT
Start: 2022-10-19 | End: 2022-10-19

## 2022-10-19 RX ADMIN — IOPAMIDOL 75 ML: 755 INJECTION, SOLUTION INTRAVENOUS at 03:48

## 2022-10-19 RX ADMIN — TRANEXAMIC ACID 1007 MG: 1 INJECTION, SOLUTION INTRAVENOUS at 04:16

## 2022-10-19 RX ADMIN — SODIUM CHLORIDE 1000 ML: 9 INJECTION, SOLUTION INTRAVENOUS at 03:58

## 2022-10-19 ASSESSMENT — PAIN - FUNCTIONAL ASSESSMENT: PAIN_FUNCTIONAL_ASSESSMENT: 0-10

## 2022-10-19 ASSESSMENT — PAIN SCALES - GENERAL: PAINLEVEL_OUTOF10: 5

## 2022-10-19 ASSESSMENT — PAIN DESCRIPTION - ORIENTATION: ORIENTATION: RIGHT

## 2022-10-19 ASSESSMENT — PAIN DESCRIPTION - LOCATION: LOCATION: FACE

## 2022-10-19 NOTE — ED NOTES
18- Called on call IR for [facility]  Per   RE Embolism of head/ neck artery   returned page @[phone]  Per , please call  ENT.      Darleen Due  10/19/22 7401

## 2022-10-19 NOTE — ED NOTES
[address] radiology  Per   RE Embolism of bleeding artery   returned page @[phone]       [facility]  10/19/22 7654

## 2022-10-19 NOTE — ED PROVIDER NOTES
[address] [facility]  ED  EMERGENCY DEPARTMENT ENCOUNTER      Pt Name: Ahmet Russell  MRN: [id number]  [place] 1965  Date of evaluation: 10/19/2022  Provider: Bob Blackwood MD    CHIEF COMPLAINT       Chief Complaint   Patient presents with    Hematemesis     HX of Throat CA          HISTORY OF PRESENT ILLNESS   (Location/Symptom, Timing/Onset, Context/Setting, Quality, Duration, Modifying Factors, Severity)  Note limiting factors. Ahmet Russell is a 59 y.o. male with past medical history of hypertension, coronary artery disease, tonsillar cancer status post completion of chemotherapy and multiple radiation treatments in August here today with blood coming from his mouth and throat    Patient presents emergency department today with blood coming out of his mouth. He has a history of tonsillar cancer status post radiation and chemotherapy with last treatment in August complicated by radiation trismus. Patient was diagnosed with mucositis by ENT and has been on steroids. He was seen in the emergency department on 10/13/2022 for bleeding coming from his mouth. At that time, is unclear if it was coming from a GI source or possible pulmonary source. His exam was quite limited as a result of his radiation trismus. Work-up was ultimately negative and he had no bleeding in the emergency department and he was discharged with outpatient ENT follow-up. At that time he was having no symptoms. He presents today for recurrent bleeding. He states out of nowhere he started to pass large clots almost the size of a Nelia at home. He got very lightheaded, dizzy and actually had a syncopal event falling and hitting his head at that time. Denies chest pain but still feels weak and tired. No shortness of breath at present. He takes no blood thinners. HPI    Nursing Notes were reviewed.     REVIEW OF SYSTEMS    (2-9 systems for level 4, 10 or more for level 5)     Review of Systems    Please see HPI for pertinent positive and negative review of system findings. A full 10 system ROS was performed and otherwise negative. PAST MEDICAL HISTORY     Past Medical History:   Diagnosis Date    CAD (coronary artery disease)     Cancer ([facility] [address]     Hypertension     Tinnitus          SURGICAL HISTORY       Past Surgical History:   Procedure Laterality Date    STOMACH SURGERY N/A 7/29/2022    ROBOTIC GASTROSTOMY TUBE PLACEMENT performed by Zulma Velasquez MD at [facility]       Discharge Medication List as of 10/19/2022  6:39 AM        CONTINUE these medications which have NOT CHANGED    Details   ibuprofen (ADVIL) 200 MG tablet Take 200 mg by mouth every 6 hours as needed for PainHistorical Med      guaiFENesin (MUCINEX) 600 MG extended release tablet Take 1,200 mg by mouth 2 times dailyHistorical Med      predniSONE (DELTASONE) 10 MG tablet Take 2 tablets by mouth daily for 7 days, THEN 1 tablet daily for 7 days. , Disp-21 tablet, R-0Normal      oxyCODONE HCl (OXYCONTIN PO) Take by mouthHistorical Med      Magic Mouthwash (MIRACLE MOUTHWASH) Swish and spit 5 mLs 4 times daily as needed for IrritationHistorical Med             ALLERGIES     Erythromycin base and Tetanus toxoid    FAMILY HISTORY       Family History   Problem Relation Age of Onset    Diabetes Father     Cancer Father     Heart Disease Father           SOCIAL HISTORY       Social History     Socioeconomic History    Marital status:    Tobacco Use    Smoking status: Former     Packs/day: 0.25     Types: Cigarettes    Smokeless tobacco: Former     Types: Chew   Vaping Use    Vaping Use: Never used   Substance and Sexual Activity    Alcohol use: Not Currently     Comment: 2 drinks a week    Drug use: Never       SCREENINGS    Roman Coma Scale  Eye Opening: Spontaneous  Best Verbal Response: Oriented  Best Motor Response: Obeys commands  Roman Coma Scale Score: 15          PHYSICAL EXAM    (up to 7 for level 4, 8 or more for level 5)     ED Triage Vitals [10/19/22 0241]   BP Temp Temp src Heart Rate Resp SpO2 Height Weight   118/81 -- -- (!) 130 22 96 % -- --       Physical Exam    General appearance:  Cooperative. Pale and somewhat ashen in appearance  Skin:  Warm. Dry. Pale  Eye:  Extraocular movements intact. Ears, nose, mouth and throat:  Oral mucosa slightly dry and pale. Significant trismus with mouth opening less than 1 cm. Unable to fully visualize the posterior oropharynx but see no obvious active bleeding though the patient does have dried blood on the right side of his face   Neck:  Trachea midline. Heart: Tachycardic but regular  Perfusion:  intact  Respiratory:  Respirations nonlabored. Lungs clear to auscultation bilaterally. Abdominal:   Non distended. Nontender  Neurological:  Alert and oriented x 3.   Moves all extremities spontaneously  Musculoskeletal:   Normal ROM, no deformities          Psychiatric:  Normal mood      DIAGNOSTIC RESULTS       Labs Reviewed   BASIC METABOLIC PANEL W/ REFLEX TO MG FOR LOW K - Abnormal; Notable for the following components:       Result Value    Sodium 132 (*)     Chloride 90 (*)     Glucose 217 (*)     BUN 28 (*)     All other components within normal limits   CBC WITH AUTO DIFFERENTIAL - Abnormal; Notable for the following components:    WBC 16.4 (*)     RBC 2.30 (*)     Hemoglobin 7.2 (*)     Hematocrit 22.2 (*)     RDW 17.3 (*)     Neutrophils Absolute 14.3 (*)     Lymphocytes Absolute 0.4 (*)     All other components within normal limits   LACTIC ACID - Abnormal; Notable for the following components:    Lactic Acid 3.9 (*)     All other components within normal limits   PROTIME-INR   APTT   TYPE AND SCREEN   PREPARE RBC (CROSSMATCH)       Interpretation per the Radiologist below, if obtained/available at the time of this note:    CT SOFT TISSUE NECK W CONTRAST   Final Result   Postop and presumed post radiation changes to the neck, oropharynx and hypopharynx. No identifiable recurrent mass. There has been resolution of   previously seen extensive cervical adenopathy. The exam is somewhat limited   as mass was not initially significantly enhancing. Consider follow-up PET CT   for more sensitive evaluation. CT Head W/O Contrast   Final Result   No acute intracranial abnormality. All other labs/imaging were within normal range or not returned as of this dictation. EMERGENCY DEPARTMENT COURSE and DIFFERENTIAL DIAGNOSIS/MDM:   Vitals:    Vitals:    10/19/22 0533 10/19/22 0542 10/19/22 0548 10/19/22 0630   BP: 127/81 (!) 142/78 132/75 114/88   Pulse: (!) 106 (!) 109 (!) 107 (!) 105   Resp: 16 20 19 18   Temp:  98.4 °F (36.9 °C)  98.1 °F (36.7 °C)   TempSrc:  Axillary  Oral   SpO2: 98% 99% 98% 96%       Patient presented to emergency department today with bleeding coming from his mouth. Very low suspicion for PE or GI source. Concern for possible head and neck bleeding likely from his tumor bed given history of radiation and radiation trismus exam markedly limited. Quite tachycardic here with low blood pressure. Acute on chronic anemia with hemoglobin of 7.2. Concern for acute blood loss anemia and possible hemorrhagic shock as the patient did have a syncopal event at home. Transfuse 1 unit PRBCs here. Emergent consultation to ENT who was concerned for possible sentinel bleed with perhaps erosion into a vascular source. ENT was highly concerned as the patient's trismus limits any potential emergent intervention that could be performed and recommended IR consultation. A CT scan was performed showing postradiation changes but no active extravasation or obvious bleeding/mass or tumor. Patient's heart rate did improve with IV fluids and transfusion here. I contacted interventional radiology at this facility but they were unable to perform any head and neck embolization here.   I also contacted interventional radiology at [facility] who stated this was quite a complex case and will be best managed at a tertiary care facility. I spoke with ENT at [facility] who accepted the patient in transfer and requested transfer to the emergency department. Patient doing much better here. While here, he is only passed approximately a quarter sized small clot and has had no further active bleeding. Stable upon transfer and maintaining his secretions and airway    I, Darwin Lyons M.D., am the primary clinician of record. MDM      CONSULTS     IP CONSULT TO OTOLARYNGOLOGY  IP CONSULT TO INTERVENTIONAL RADIOLOGY    Critical Care:     CRITICAL CARE TIME    I personally saw the patient and independently provided 30 minutes of non-concurrent critical care out of the total shared critical care time provided, excluding separately reportable procedures. There was a high probability of clinically significant/life threatening deterioration in the patient's condition which required my urgent intervention. This time was spent reviewing the patient chart, interpreting diagnostic/laboratory data, treatment of hemorrhagic shock and acute blood loss anemia with transfusion of PRBCs, speaking with multiple consulting physicians and arranging transfer      [address]     Unless otherwise noted below, none     Procedures      FINAL IMPRESSION      1. Hemorrhage from throat    2. Acute blood loss anemia            DISPOSITION/PLAN   DISPOSITION Decision To Transfer 10/19/2022 05:36:20 AM        PATIENT REFERRED TO:  No follow-up provider specified. DISCHARGE MEDICATIONS:  Discharge Medication List as of 10/19/2022  6:39 AM        Controlled Substances Monitoring:     No flowsheet data found.     (Please note that portions of this note were completed with a voice recognition program.  Efforts were made to edit the dictations but occasionally words are mis-transcribed.)    Darwin Lyons MD (electronically signed)  Attending Emergency Physician            Sherin Tran MD  10/20/22 5442

## 2022-10-19 NOTE — ED NOTES
Report called to [facility] [address] ED, all questions addressed. Pt aware of transfer/admission status with all questions addressed. Awaiting transport.       Ana Chung RN  10/19/22 2873

## 2022-10-19 NOTE — ED NOTES
12- Called [facility] center, spoke to Velma Medellin  Per . Yasmine Adams 134 speak to  [facility] ENT for Oropharyngeal bleeding    returned page @[phone]  Per Yaneli Montenegro transfer patient to [facility] ED.  Shanon Rivers speaking to [facility] ED MD Imer Aviles.   Patient accepted ED to ED @0394  [facility] ETA 0615   N2N # [phone]  10/19/22 [address]

## 2022-10-19 NOTE — ED NOTES
Patient and family updated on transport time for transfer to [facility].      Fermin Mazariegos RN  10/19/22 0944

## 2022-10-19 NOTE — CONSULTS
Discussed case with emergency room physician. In brief a 58 yo male s/p Chmo-XRT for T3N2M0 SCCa right tonsil. Moderate to severe trismus prior to therapy now with severe trismus. Now has had to self limited bleeding events from oropharynx. CT with of neck showed large response to therapy with the internal carotid immediate adjacent to prior cancer site likely exposed to oral contents. Concern patient will need both IR and possible intervention and a free tissue transfer to cover carotid which [facility] does not have. Recommended transfer to [address] [place] [address]. Appreciate their acceptance of this challenging patient.    SAURAV

## 2022-10-19 NOTE — ED TRIAGE NOTES
Pt to ED via EMS from home for complaints of hematemesis. Pt states hx of throat CA for which he is receiving radiation treatment. Pt states that he woke up approx 130 this AM and had blood pouring out of his mouth. Pt states this has happened on more than one occasion but tonight was the worst one. Pt presents with bleeding controlled at time of triage, is A&Ox4.

## 2022-11-11 NOTE — PROGRESS NOTES
6/6/2022 - 6/9/2022 (3 days)  [address] interrogation by company representative. See interrogation for further details. Pre discharge device check for DC PPM implanted 6/8/22 and RA lead revision 6/9/22. P wave 4.6 mv  R wave 7.9 mv  Atrial threshold 0.5 v-0.4 ms  RV threshold 0.5 v-0.4 ms   No  arrhythmias recorded. Device functioning as programmed. See PACEART report under Cardiology tab.
No

## 2023-02-28 ENCOUNTER — TELEPHONE (OUTPATIENT)
Dept: CARDIOLOGY CLINIC | Age: 58
End: 2023-02-28

## 2023-03-07 ENCOUNTER — TELEPHONE (OUTPATIENT)
Dept: SURGERY | Age: 58
End: 2023-03-07

## 2023-03-07 ENCOUNTER — NURSE ONLY (OUTPATIENT)
Dept: CARDIOLOGY CLINIC | Age: 58
End: 2023-03-07

## 2023-03-07 ENCOUNTER — APPOINTMENT (OUTPATIENT)
Dept: GENERAL RADIOLOGY | Age: 58
End: 2023-03-07
Payer: COMMERCIAL

## 2023-03-07 ENCOUNTER — HOSPITAL ENCOUNTER (EMERGENCY)
Age: 58
Discharge: HOME | End: 2023-03-07
Attending: EMERGENCY MEDICINE
Payer: COMMERCIAL

## 2023-03-07 VITALS
SYSTOLIC BLOOD PRESSURE: 122 MMHG | OXYGEN SATURATION: 100 % | WEIGHT: 134 LBS | HEIGHT: 72 IN | DIASTOLIC BLOOD PRESSURE: 87 MMHG | RESPIRATION RATE: 14 BRPM | TEMPERATURE: 97.5 F | HEART RATE: 89 BPM | BODY MASS INDEX: 18.15 KG/M2

## 2023-03-07 DIAGNOSIS — T85.528A DISLODGED GASTROSTOMY TUBE: Primary | ICD-10-CM

## 2023-03-07 PROCEDURE — 99283 EMERGENCY DEPT VISIT LOW MDM: CPT

## 2023-03-07 PROCEDURE — 71046 X-RAY EXAM CHEST 2 VIEWS: CPT

## 2023-03-07 PROCEDURE — 74018 RADEX ABDOMEN 1 VIEW: CPT

## 2023-03-07 PROCEDURE — 6360000004 HC RX CONTRAST MEDICATION: Performed by: NURSE PRACTITIONER

## 2023-03-07 RX ADMIN — DIATRIZOATE MEGLUMINE AND DIATRIZOATE SODIUM 120 ML: 660; 100 LIQUID ORAL; RECTAL at 16:54

## 2023-03-07 ASSESSMENT — PAIN - FUNCTIONAL ASSESSMENT: PAIN_FUNCTIONAL_ASSESSMENT: 0-10

## 2023-03-07 ASSESSMENT — PAIN SCALES - GENERAL: PAINLEVEL_OUTOF10: 6

## 2023-03-07 NOTE — TELEPHONE ENCOUNTER
Spoke with wife and she states the entire feeding tube is out. Per Ysabel Wright, patient will need to go to ER to have feeding tube put back in due to needing an xray also. Patient's wife advised and verbalized understanding.

## 2023-03-07 NOTE — TELEPHONE ENCOUNTER
Pt's wife 'Stephen Marie' called and said his feeding tube was accidentally pulled completely out and wants to know what to do.

## 2023-03-07 NOTE — ED PROVIDER NOTES
[address] [place] [address]  ED  [address] [address]  Dept: [phone]  Dept Fax: [phone]  Loc: [facility]        This patient was seen and evaluated per myself in conjunction with ED attending Dr Mian Buckner. CHIEF COMPLAINT    Chief Complaint   Patient presents with    G Tube Complications     Patient reports while trying to urinate snagged g tube and it fell out. HPI    Ryan Slade is a 59 y.o. male who presents to our emergency department to evaluate the function of the G-tube that is localized in the upper abdomen. The context is he was trying to urinate when the G-tube got snagged and pulled out. Has had the feeding tube >1 year. There are no alleviating factors. The pain is 6/10 in severity. REVIEW OF SYSTEMS    GI: + abdominal pain, no vomiting  Pulmonary: No difficulty breathing or new cough  General: No fevers  Neurologic: No decreased level of consciousness  All other systems reviewed and are negative.     PAST MEDICAL & SURGICAL HISTORY    Past Medical History:   Diagnosis Date    CAD (coronary artery disease)     Cancer ([facility] [address]     Hypertension     Tinnitus      Past Surgical History:   Procedure Laterality Date    STOMACH SURGERY N/A 7/29/2022    ROBOTIC GASTROSTOMY TUBE PLACEMENT performed by Loretta Daly MD at [address] [place] [address]    Current Outpatient Rx   Medication Sig Dispense Refill    ibuprofen (ADVIL) 200 MG tablet Take 200 mg by mouth every 6 hours as needed for Pain      guaiFENesin (MUCINEX) 600 MG extended release tablet Take 1,200 mg by mouth 2 times daily      oxyCODONE HCl (OXYCONTIN PO) Take by mouth (Patient not taking: Reported on 10/11/2022)      Magic Mouthwash (MIRACLE MOUTHWASH) Swish and spit 5 mLs 4 times daily as needed for Irritation         ALLERGIES    Allergies   Allergen Reactions    Erythromycin Base      uncoated    Tetanus Toxoid      last injection was at age 13, got very high fever       SOCIAL AND FAMILY HISTORY    Social History     Socioeconomic History    Marital status:    Tobacco Use    Smoking status: Former     Packs/day: 0.25     Types: Cigarettes    Smokeless tobacco: Former     Types: Chew   Vaping Use    Vaping Use: Never used   Substance and Sexual Activity    Alcohol use: Not Currently     Comment: 2 drinks a week    Drug use: Never     Family History   Problem Relation Age of Onset    Diabetes Father     Cancer Father     Heart Disease Father        PHYSICAL EXAM    VITAL SIGNS: /87   Pulse 89   Temp 97.5 °F (36.4 °C) (Axillary)   Resp 14   Ht 5' 11.5\" (1.816 m)   Wt 134 lb (60.8 kg)   SpO2 100%   BMI 18.43 kg/m²   Constitutional:  Appears comfortable  Eyes:  Sclera nonicteric, conjunctiva moist  HENT:  Atraumatic, moist mucous membrane  Neck: no obvious masses  Respiratory:  No retractions, breathing comfortably  Cardiovascular:   no JVD  GI:  Soft, no abdominal tenderness, no guarding, bowel sounds present, no audible bruits or palpable pulsatile masses. G-tube is out, family brought it with them. Is intact. Balloon was inflated when he pulled out his G-tube. Integument: Mild swelling and redness at the tract, without fluctuance or discharge from the G-tube site  Neurologic:  A&Ox4, moving all 4 extremities spontaneously and equally    PROCEDURES    Gastrostomy Tube Replacement Procedure Note  Indication: gastrostomy tube malfunction  Procedure: The patient was placed in the supine position and the patient's gastric tube was replaced using an 18 Danish andres catheter tube and the bulb was inflated using 10 cc of saline. The placement was verified by Gastrografin injection. The patient tolerated the procedure well. Complications: None        RADIOLOGY  XR ABDOMEN (KUB) (SINGLE AP VIEW)   Preliminary Result   A gastrostomy tube is in place. Contrast is noted within the stomach,   duodenum and proximal jejunum.   No extraluminal contrast.      A pacemaker lead appears looped in the right heart, incompletely imaged. Consultation with cardiology is recommended to ensure appropriate positioning. ED COURSE & MEDICAL DECISION MAKING    Pertinent Labs & Imaging studies reviewed and interpreted. (See chart for details)   See chart for details of medications given during the ED stay. History from : Patient    Limitations to history : None    Chronic Conditions: Radical neck surgical history  Past Medical History:   Diagnosis Date    CAD (coronary artery disease)     Cancer ([facility] [address]     Hypertension     Tinnitus        CONSULTS: (Who and What was discussed)  None    Discussion with Other Profesionals : None    Social Determinants : None    Records Reviewed : None    CC/HPI Summary, DDx, ED Course, and Reassessment: See HPI and above for full presentation physical exam.    Patient is a very pleasant 25-year-old male the presents the ED today after he pulled out his G-tube. Pulled it out with the balloon inflated. Had it placed over a year ago. Track is old. He had some mild pain when he pulled out the tube. No pain currently. Came with tube intact, in a baggy. Balloon still inflated. No signs of damage to the old G-tube or possible breakage. He is afebrile and hemodynamically stable on arrival.  Abdomen is flat, nontender. The site of the G-tube track does look slightly swollen with erythema. Does not feel hot to the touch, does not look cellulitic. Likely secondary to him pulling out his G-tube with the balloon inflated. Differential diagnosis:  G-tube blockage, abdominal wall infection, g-tube lodged within abdominal wall, g-tube cracked/leaking, g-tube within peritoneal cavity, peritonitis, bowel obstruction, other    Patient is afebrile and nontoxic in appearance. G-tube was replaced with an 25 Upper sorbian feeding tube, see above procedure note.   Patient had some mild pain when placement but tolerated procedure well with no immediate complications noted. Balloon was inflated with 15 cc of sterile saline. We will obtain plain films with Gastrografin to verify placement. X-ray verified placement per my radiology read. However according to the radiologist read there is a concern that pacemaker lead is looped in the right heart. He will follow-up regarding this that is the end of my shift. As this is the end of my shift the attending physician will continue care of this patient. Mauro Perry was signed out in stable condition. Please see Alan Franz DO note for further details, including diagnosis and disposition. Disposition Considerations (Tests not ordered but considered, Shared Decision Making, Pt Expectation of Test or Tx.): n/a      I am the Primary Clinician of Record. FINAL IMPRESSION    1.  Dislodged gastrostomy tube        PLAN  See attendings note for final disposition    (Please note that this note was completed with a voice recognition program.  Every attempt was made to edit the dictations, but inevitably there remain words that are mis-transcribed.)            RAMON Snell - AJIT  03/07/23 6833

## 2023-03-07 NOTE — ED NOTES
New G tube inserted by Opal Ards NP with RN at bedside. XR to confirm placement at this time.       Sloan Resendiz RN  03/07/23 3745

## 2023-03-07 NOTE — ED NOTES
Writer reviewed discharge instructions, medications, and follow-up instructions, patient and wife verbalize understanding. Patient stable, ambulatory with steady gait, GCS 15, no signs/ symptoms of acute distress, respirations unlabored, and with spouse.       Anahy Lee RN  03/07/23 9406

## 2023-03-07 NOTE — ED NOTES
[facility] cardiology consult @7807  Re: pacemaker issue, need EP per [email or web address] [facility]  03/07/23 3835

## 2023-03-07 NOTE — ED PROVIDER NOTES
[address] [facility]  ED     EMERGENCY DEPARTMENT ENCOUNTER            Pt Name: Ashwin Troy   MRN: [id number]   [place] 1965   Date of evaluation: 3/7/2023   Provider: Vahid Bustillo DO   PCP: [facility]   Note Started: 5:29 PM EST 3/7/23          CHIEF COMPLAINT     Chief Complaint   Patient presents with    G Tube Complications     Patient reports while trying to urinate snagged g tube and it fell out. HISTORY OF PRESENT ILLNESS:   History from : Patient and Family wife. Ashwin Troy is a 59 y.o. male who presents to the emergency department stating that he accidentally dislodged his G-tube. Patient has a history of oropharyngeal cancer. No additional complaints at this time. Patient denies pain. Nursing Notes were all reviewed and agreed with, or any disagreements were addressed in the HPI. REVIEW OF SYSTEMS :    Positives and Pertinent negatives as per HPI. MEDICAL HISTORY   has a past medical history of CAD (coronary artery disease), Cancer ([address] Hypertension, and Tinnitus.     Past Surgical History:   Procedure Laterality Date    STOMACH SURGERY N/A 7/29/2022    ROBOTIC GASTROSTOMY TUBE PLACEMENT performed by Jasmine Conrad MD at [facility]       Previous Medications    GUAIFENESIN (MUCINEX) 600 MG EXTENDED RELEASE TABLET    Take 1,200 mg by mouth 2 times daily    IBUPROFEN (ADVIL) 200 MG TABLET    Take 200 mg by mouth every 6 hours as needed for Pain    MAGIC MOUTHWASH (MIRACLE MOUTHWASH)    Swish and spit 5 mLs 4 times daily as needed for Irritation    OXYCODONE HCL (OXYCONTIN PO)    Take by mouth      SCREENINGS          Roman Coma Scale  Eye Opening: Spontaneous  Best Verbal Response: Oriented  Best Motor Response: Obeys commands  Roman Coma Scale Score: 15                CIWA Assessment  BP: 122/87  Heart Rate: 89                  PHYSICAL EXAM :  ED Triage Vitals [03/07/23 1607]   BP Temp Temp Source Heart Rate Resp SpO2 Height Weight   122/87 97.5 °F (36.4 °C) Axillary 89 14 100 % 5' 11.5\" (1.816 m) 134 lb (60.8 kg)      GENERAL APPEARANCE: Awake and alert. Cooperative. No acute distress. HEAD: Normocephalic. Atraumatic. EYES: PERRL. EOM's grossly intact. ENT: Mucous membranes are moist.   NECK: Supple, trachea midline. HEART: RRR. Normal S1, S2. No murmurs, rubs or gallops. LUNGS: Respirations unlabored. CTAB. Good air exchange. No wheezes, rales, or rhonchi. Speaking comfortably in full sentences. ABDOMEN: Soft. Non-distended. Non-tender. G-tube dislodged. No guarding or rebound. Normal Bowel sounds. EXTREMITIES: No peripheral edema. MAEE. No acute deformities. SKIN: Warm and dry. No acute rashes. NEUROLOGICAL: Alert and oriented X 3. CN II-XII intact. No gross facial drooping. Strength 5/5 in all extremities. Sensation intact. No pronator drift. Normal coordination. Gait normal.   PSYCHIATRIC: Normal mood and affect. DIAGNOSTIC RESULTS     LABS:   Labs Reviewed - No data to display   When ordered only abnormal lab results are displayed. All other labs were within normal range or not returned as of this dictation. RADIOLOGY:      Non-plain film images such as CT, Ultrasound and MRI are read by the radiologist. Plain radiographic images are visualized and preliminarily interpreted by the ED Provider with the below findings:   Interpretation per the Radiologist below, if available at the time of this note:     XR CHEST (2 VW)   Final Result   Left-sided cardiac pacer device appears properly placed. No evidence of   pneumothorax         XR ABDOMEN (KUB) (SINGLE AP VIEW)   Preliminary Result   A gastrostomy tube is in place. Contrast is noted within the stomach,   duodenum and proximal jejunum. No extraluminal contrast.      A pacemaker lead appears looped in the right heart, incompletely imaged. Consultation with cardiology is recommended to ensure appropriate positioning. XR ABDOMEN (KUB) (SINGLE AP VIEW)    Result Date: 3/7/2023  EXAMINATION: ONE SUPINE XRAY VIEW(S) OF THE ABDOMEN 3/7/2023 4:25 pm COMPARISON: CT dated 10/13/2022 HISTORY: ORDERING SYSTEM PROVIDED HISTORY: g-tube replacement verification with gastrografin TECHNOLOGIST PROVIDED HISTORY: with gastrografin Reason for exam:->g-tube replacement verification with gastrografin Reason for Exam: g-tube replacement verification with gastrografin FINDINGS: 2 images were obtained. A gastrostomy tube is in place. Contrast has been injected. Contrast is noted in the stomach, duodenum and proximal jejunum. No extraluminal contrast is identified. A pacemaker lead is looped on the right, incompletely imaged this appears changed in location when compared to the 06/08/2022 exam.     A gastrostomy tube is in place. Contrast is noted within the stomach, duodenum and proximal jejunum. No extraluminal contrast. A pacemaker lead appears looped in the right heart, incompletely imaged. Consultation with cardiology is recommended to ensure appropriate positioning. PROCEDURES: G-tube placement: Please see nurse practitioner note. Pacer interrogation: Leads are sensing appropriately. Patient has had several events of 1 to 2 seconds concerning for NSVT. He has also had several episodes of sinus tachycardia with rates of approximately 150. Vitals:    Vitals:    03/07/23 1607   BP: 122/87   Pulse: 89   Resp: 14   Temp: 97.5 °F (36.4 °C)   TempSrc: Axillary   SpO2: 100%   Weight: 134 lb (60.8 kg)   Height: 5' 11.5\" (1.816 m)             Is this patient to be included in the SEP-1 Core Measure due to severe sepsis or septic shock? No   Exclusion criteria - the patient is NOT to be included for SEP-1 Core Measure due to: Infection is not suspected           CC/HPI Summary, DDx, ED Course, and Reassessment:       Patient presents to the emergency department for G-tube complication.   Patient accidentally pulled out his G-tube which has been present for 6 to 9 months. No trauma noted at the site. Tube was easily placed by nurse practitioner and checked by x-ray. It appears to be in place. However, patient had noted potential complication of pacer leads on abdominal x-ray. Leads were rechecked with chest x-ray. Additionally, pacer interrogation was completed per the direction of Dr. Ama Slade from cardiology. Repeat chest x-ray and pacer interrogation results were reviewed by cardiology and patient was deemed safe for discharge and outpatient follow-up. Patient was given the following medications:   Medications   diatrizoate meglumine-sodium (GASTROGRAFIN) 66-10 % solution 120 mL (120 mLs Per G Tube Given 3/7/23 8591)        CONSULTS:   IP CONSULT TO CARDIOLOGY   Discussion with Other Professionals: Cardiology as above. {Social Determinants: None   Chronic Conditions: History of oropharyngeal cancer  Records Reviewed:       Disposition Considerations:    I am the Primary Clinician of Record. FINAL IMPRESSION    1. Dislodged gastrostomy tube           DISPOSITION/PLAN     PATIENT REFERRED TO:   No follow-up provider specified.      DISCHARGE MEDICATIONS:   New Prescriptions    No medications on file        DISCONTINUED MEDICATIONS:   Discontinued Medications    No medications on file              (Please note that portions of this note were completed with a voice recognition program.  Efforts were made to edit the dictations but occasionally words are mis-transcribed.)       Kaitlin Freeman DO (electronically signed)             Kaitlin Freeman DO  03/07/23 7502

## 2023-03-08 NOTE — PROGRESS NOTES
[facility] [employer] transmission. Transmission shows normal sensing and pacing function. EP physician will review. See interrogation under the cardiology tab in the [address] [place] [address] [address] field for more details. Will continue to monitor remotely. PAT/SVT noted. ADDITIONAL NOTES  Reason for Check: X-ray concerned about lead malfunction. Physician reported x-ray noted possible extra loop in the right heart per physician. DEVICE ASSESSMENT: Available daily battery/lead measurements within expected range. Lead trends stable. Rwaves 2.4mV and sensitivity 0.9mV. ARRHYTHMIA SUMMARY: 5 NSVT ; most recent on [id number]. 14 Fast AV ; most recent on [id number]. See attached episode list & stored EGMs for details. OBSERVATIONS: Night heart rate over 85 bpm for 7 days. Device appears to be currently functioning as programmed.

## 2023-03-08 NOTE — PROGRESS NOTES
Cardiology on call received call. Pacemaker lead with mild prolapse into RV. Patient asymptomatic. Device interrogation reportedly normal. Can follow up outpatient.

## 2023-03-15 ENCOUNTER — TELEPHONE (OUTPATIENT)
Dept: SURGERY | Age: 58
End: 2023-03-15

## 2023-03-15 RX ORDER — NYSTATIN 100000 U/G
CREAM TOPICAL
Qty: 30 G | Refills: 1 | Status: SHIPPED | OUTPATIENT
Start: 2023-03-15 | End: 2023-03-17 | Stop reason: SDUPTHER

## 2023-03-17 RX ORDER — NYSTATIN 100000 U/G
CREAM TOPICAL
Qty: 30 G | Refills: 1 | Status: SHIPPED | OUTPATIENT
Start: 2023-03-17

## 2023-03-17 NOTE — TELEPHONE ENCOUNTER
From: Walt Mcdowell  To:  Office of Dr. Maya Woodall: 3/17/2023 6:22 AM EDT  Subject: Medication Renewal Request    Refills have been requested for the following medications:     nystatin (MYCOSTATIN) 538986 UNIT/GM cream [Dr. Jasvir Martines MD]   Patient Comment: G tube leaking heavily    Preferred pharmacy: [facility] [id number]  [id number] [facility] [phone]

## 2023-03-18 ENCOUNTER — APPOINTMENT (OUTPATIENT)
Dept: CT IMAGING | Age: 58
End: 2023-03-18
Payer: COMMERCIAL

## 2023-03-18 ENCOUNTER — APPOINTMENT (OUTPATIENT)
Dept: GENERAL RADIOLOGY | Age: 58
End: 2023-03-18
Payer: COMMERCIAL

## 2023-03-18 ENCOUNTER — HOSPITAL ENCOUNTER (EMERGENCY)
Age: 58
Discharge: HOME | End: 2023-03-18
Payer: COMMERCIAL

## 2023-03-18 VITALS
HEIGHT: 71 IN | DIASTOLIC BLOOD PRESSURE: 74 MMHG | SYSTOLIC BLOOD PRESSURE: 121 MMHG | WEIGHT: 135 LBS | HEART RATE: 94 BPM | RESPIRATION RATE: 16 BRPM | TEMPERATURE: 98.2 F | BODY MASS INDEX: 18.9 KG/M2 | OXYGEN SATURATION: 99 %

## 2023-03-18 DIAGNOSIS — L03.311 CELLULITIS OF ABDOMINAL WALL: Primary | ICD-10-CM

## 2023-03-18 DIAGNOSIS — K94.20 COMPLICATION OF GASTROSTOMY TUBE (HCC): ICD-10-CM

## 2023-03-18 LAB
ALBUMIN SERPL-MCNC: 4.3 G/DL (ref 3.4–5)
ALBUMIN/GLOB SERPL: 1.5 {RATIO} (ref 1.1–2.2)
ALP SERPL-CCNC: 75 U/L (ref 40–129)
ALT SERPL-CCNC: 12 U/L (ref 10–40)
ANION GAP SERPL CALCULATED.3IONS-SCNC: 13 MMOL/L (ref 3–16)
AST SERPL-CCNC: 12 U/L (ref 15–37)
BASOPHILS # BLD: 0.1 K/UL (ref 0–0.2)
BASOPHILS NFR BLD: 0.5 %
BILIRUB SERPL-MCNC: <0.2 MG/DL (ref 0–1)
BUN SERPL-MCNC: 22 MG/DL (ref 7–20)
CALCIUM SERPL-MCNC: 10.1 MG/DL (ref 8.3–10.6)
CHLORIDE SERPL-SCNC: 95 MMOL/L (ref 99–110)
CO2 SERPL-SCNC: 30 MMOL/L (ref 21–32)
CREAT SERPL-MCNC: 0.7 MG/DL (ref 0.9–1.3)
DEPRECATED RDW RBC AUTO: 13.9 % (ref 12.4–15.4)
EOSINOPHIL # BLD: 0.1 K/UL (ref 0–0.6)
EOSINOPHIL NFR BLD: 0.7 %
GFR SERPLBLD CREATININE-BSD FMLA CKD-EPI: >60 ML/MIN/{1.73_M2}
GLUCOSE SERPL-MCNC: 113 MG/DL (ref 70–99)
HCT VFR BLD AUTO: 34.8 % (ref 40.5–52.5)
HGB BLD-MCNC: 11.9 G/DL (ref 13.5–17.5)
LACTATE BLDV-SCNC: 1.1 MMOL/L (ref 0.4–2)
LYMPHOCYTES # BLD: 0.6 K/UL (ref 1–5.1)
LYMPHOCYTES NFR BLD: 5.9 %
MCH RBC QN AUTO: 32.3 PG (ref 26–34)
MCHC RBC AUTO-ENTMCNC: 34.1 G/DL (ref 31–36)
MCV RBC AUTO: 94.7 FL (ref 80–100)
MONOCYTES # BLD: 1.1 K/UL (ref 0–1.3)
MONOCYTES NFR BLD: 10.5 %
NEUTROPHILS # BLD: 8.9 K/UL (ref 1.7–7.7)
NEUTROPHILS NFR BLD: 82.4 %
PLATELET # BLD AUTO: 338 K/UL (ref 135–450)
PMV BLD AUTO: 6.3 FL (ref 5–10.5)
POTASSIUM SERPL-SCNC: 3.9 MMOL/L (ref 3.5–5.1)
PROT SERPL-MCNC: 7.1 G/DL (ref 6.4–8.2)
RBC # BLD AUTO: 3.68 M/UL (ref 4.2–5.9)
SODIUM SERPL-SCNC: 138 MMOL/L (ref 136–145)
WBC # BLD AUTO: 10.9 K/UL (ref 4–11)

## 2023-03-18 PROCEDURE — 2709999900 XR ABDOMEN (KUB) (SINGLE AP VIEW)

## 2023-03-18 PROCEDURE — 6370000000 HC RX 637 (ALT 250 FOR IP): Performed by: PHYSICIAN ASSISTANT

## 2023-03-18 PROCEDURE — 74177 CT ABD & PELVIS W/CONTRAST: CPT

## 2023-03-18 PROCEDURE — 6360000002 HC RX W HCPCS: Performed by: PHYSICIAN ASSISTANT

## 2023-03-18 PROCEDURE — 83605 ASSAY OF LACTIC ACID: CPT

## 2023-03-18 PROCEDURE — 85025 COMPLETE CBC W/AUTO DIFF WBC: CPT

## 2023-03-18 PROCEDURE — 6360000004 HC RX CONTRAST MEDICATION: Performed by: PHYSICIAN ASSISTANT

## 2023-03-18 PROCEDURE — 80053 COMPREHEN METABOLIC PANEL: CPT

## 2023-03-18 PROCEDURE — 99285 EMERGENCY DEPT VISIT HI MDM: CPT

## 2023-03-18 PROCEDURE — 2580000003 HC RX 258: Performed by: PHYSICIAN ASSISTANT

## 2023-03-18 PROCEDURE — 96374 THER/PROPH/DIAG INJ IV PUSH: CPT

## 2023-03-18 RX ORDER — MORPHINE SULFATE 4 MG/ML
4 INJECTION, SOLUTION INTRAMUSCULAR; INTRAVENOUS ONCE
Status: DISCONTINUED | OUTPATIENT
Start: 2023-03-18 | End: 2023-03-18 | Stop reason: HOSPADM

## 2023-03-18 RX ORDER — CEPHALEXIN 250 MG/1
500 CAPSULE ORAL ONCE
Status: COMPLETED | OUTPATIENT
Start: 2023-03-18 | End: 2023-03-18

## 2023-03-18 RX ORDER — PSEUDOEPHEDRINE HCL 30 MG/5 ML
15 LIQUID (ML) ORAL 4 TIMES DAILY PRN
COMMUNITY

## 2023-03-18 RX ORDER — SULFAMETHOXAZOLE AND TRIMETHOPRIM 800; 160 MG/1; MG/1
1 TABLET ORAL ONCE
Status: DISCONTINUED | OUTPATIENT
Start: 2023-03-18 | End: 2023-03-18

## 2023-03-18 RX ORDER — CEPHALEXIN 500 MG/1
500 CAPSULE ORAL 4 TIMES DAILY
Qty: 40 CAPSULE | Refills: 0 | Status: SHIPPED | OUTPATIENT
Start: 2023-03-18 | End: 2023-03-28

## 2023-03-18 RX ORDER — SULFAMETHOXAZOLE AND TRIMETHOPRIM 800; 160 MG/1; MG/1
2 TABLET ORAL 2 TIMES DAILY
Qty: 40 TABLET | Refills: 0 | Status: SHIPPED | OUTPATIENT
Start: 2023-03-18 | End: 2023-03-28

## 2023-03-18 RX ORDER — 0.9 % SODIUM CHLORIDE 0.9 %
1000 INTRAVENOUS SOLUTION INTRAVENOUS ONCE
Status: COMPLETED | OUTPATIENT
Start: 2023-03-18 | End: 2023-03-18

## 2023-03-18 RX ORDER — NYSTATIN 100000 U/G
CREAM TOPICAL ONCE
Status: COMPLETED | OUTPATIENT
Start: 2023-03-18 | End: 2023-03-18

## 2023-03-18 RX ORDER — SULFAMETHOXAZOLE AND TRIMETHOPRIM 800; 160 MG/1; MG/1
1 TABLET ORAL ONCE
Status: COMPLETED | OUTPATIENT
Start: 2023-03-18 | End: 2023-03-18

## 2023-03-18 RX ORDER — ONDANSETRON 2 MG/ML
4 INJECTION INTRAMUSCULAR; INTRAVENOUS ONCE
Status: DISCONTINUED | OUTPATIENT
Start: 2023-03-18 | End: 2023-03-18 | Stop reason: HOSPADM

## 2023-03-18 RX ORDER — KETOROLAC TROMETHAMINE 30 MG/ML
30 INJECTION, SOLUTION INTRAMUSCULAR; INTRAVENOUS ONCE
Status: COMPLETED | OUTPATIENT
Start: 2023-03-18 | End: 2023-03-18

## 2023-03-18 RX ORDER — NYSTATIN 100000 U/G
OINTMENT TOPICAL
Qty: 2 EACH | Refills: 3 | Status: SHIPPED | OUTPATIENT
Start: 2023-03-18

## 2023-03-18 RX ADMIN — NYSTATIN: 100000 CREAM TOPICAL at 15:35

## 2023-03-18 RX ADMIN — SODIUM CHLORIDE 1000 ML: 9 INJECTION, SOLUTION INTRAVENOUS at 11:09

## 2023-03-18 RX ADMIN — DIATRIZOATE MEGLUMINE AND DIATRIZOATE SODIUM 20 ML: 660; 100 LIQUID ORAL; RECTAL at 10:54

## 2023-03-18 RX ADMIN — DIATRIZOATE MEGLUMINE AND DIATRIZOATE SODIUM 120 ML: 660; 100 LIQUID ORAL; RECTAL at 10:56

## 2023-03-18 RX ADMIN — IOPAMIDOL 75 ML: 755 INJECTION, SOLUTION INTRAVENOUS at 14:43

## 2023-03-18 RX ADMIN — CEPHALEXIN 500 MG: 250 CAPSULE ORAL at 16:05

## 2023-03-18 RX ADMIN — SULFAMETHOXAZOLE AND TRIMETHOPRIM 1 TABLET: 800; 160 TABLET ORAL at 16:05

## 2023-03-18 RX ADMIN — KETOROLAC TROMETHAMINE 30 MG: 30 INJECTION, SOLUTION INTRAMUSCULAR; INTRAVENOUS at 11:08

## 2023-03-18 ASSESSMENT — PAIN SCALES - GENERAL
PAINLEVEL_OUTOF10: 0
PAINLEVEL_OUTOF10: 0
PAINLEVEL_OUTOF10: 9
PAINLEVEL_OUTOF10: 3

## 2023-03-18 ASSESSMENT — PAIN DESCRIPTION - PAIN TYPE: TYPE: ACUTE PAIN

## 2023-03-18 ASSESSMENT — PAIN DESCRIPTION - FREQUENCY: FREQUENCY: CONTINUOUS

## 2023-03-18 ASSESSMENT — PAIN - FUNCTIONAL ASSESSMENT
PAIN_FUNCTIONAL_ASSESSMENT: 0-10
PAIN_FUNCTIONAL_ASSESSMENT: NONE - DENIES PAIN

## 2023-03-18 ASSESSMENT — PAIN DESCRIPTION - LOCATION: LOCATION: ABDOMEN

## 2023-03-18 ASSESSMENT — PAIN DESCRIPTION - ONSET: ONSET: ON-GOING

## 2023-03-18 NOTE — ED PROVIDER NOTES
[address] [facility]  ED  Emergency Department Encounter    Patient Name: Rima Porter  MRN: [id number]  [place]: 1965  Date of Evaluation: 3/18/2023  Provider: [facility]  Note Started: 10:37 AM EDT 3/18/23    CHIEF COMPLAINT  G Tube Complications (Pt states \"I have a poorly put in feeding tube\" pt family states leaking around site for 3 days)    SHARED SERVICE VISIT  Evaluated by COREY. My supervising physician was available for consultation. HISTORY OF PRESENT ILLNESS  Rima Porter is a 59 y.o. male who presents to the ED discomfort and drainage around G-tube site. Patient has had this in for some time now. Reports that over the past several days has become draining suspected gastric contents. Has had some redness and irritation surrounding site improved with nystatin cream.  States that he has had some nausea without vomiting. No diarrhea or constipation. Denies fevers chills. No chest pain or shortness of breath. Denies cough congestion. No headaches or dizziness. .    No other complaints, modifying factors or associated symptoms. Nursing notes reviewed were all reviewed and agreed with or any disagreements were addressed in the HPI.     PMH:  Past Medical History:   Diagnosis Date    CAD (coronary artery disease)     Cancer ([facility] [address]     Hypertension     Tinnitus      Surgical History:  Past Surgical History:   Procedure Laterality Date    STOMACH SURGERY N/A 7/29/2022    ROBOTIC GASTROSTOMY TUBE PLACEMENT performed by Zulma Velasquez MD at [facility] OR     Family History:  Family History   Problem Relation Age of Onset    Diabetes Father     Cancer Father     Heart Disease Father      Social History:  Social History     Socioeconomic History    Marital status:      Spouse name: Not on file    Number of children: Not on file    Years of education: Not on file    Highest education level: Not on file   Occupational History    Not on file   Tobacco Use Smoking status: Former     Packs/day: 0.25     Types: Cigarettes    Smokeless tobacco: Former     Types: Chew   Vaping Use    Vaping Use: Never used   Substance and Sexual Activity    Alcohol use: Not Currently     Comment: 2 drinks a week    Drug use: Never    Sexual activity: Not on file   Other Topics Concern    Not on file   Social History Narrative    Not on file     Social Determinants of Health     Financial Resource Strain: Not on file   Food Insecurity: Not on file   Transportation Needs: Not on file   Physical Activity: Not on file   Stress: Not on file   Social Connections: Not on file   Intimate Partner Violence: Not on file   Housing Stability: Not on file     Current Medications:  No current facility-administered medications for this encounter. Current Outpatient Medications   Medication Sig Dispense Refill    acetaminophen (TYLENOL) 80 MG/0.8ML suspension 15 mg/kg by Per G Tube route every 4 hours as needed for Fever      pseudoephedrine (SUDAFED) 30 MG/5ML syrup Take 15 mg by mouth 4 times daily as needed      sulfamethoxazole-trimethoprim (BACTRIM DS) 800-160 MG per tablet Take 2 tablets by mouth 2 times daily for 10 days 40 tablet 0    cephALEXin (KEFLEX) 500 MG capsule Take 1 capsule by mouth 4 times daily for 10 days 40 capsule 0    nystatin (MYCOSTATIN) 903212 UNIT/GM ointment Apply topically 2 times daily. 2 each 3    nystatin (MYCOSTATIN) 901299 UNIT/GM cream Apply topically tid prn 30 g 1    ibuprofen (ADVIL;MOTRIN) 200 MG tablet Take 200 mg by mouth every 6 hours as needed for Pain      guaiFENesin (MUCINEX) 600 MG extended release tablet Take 1,200 mg by mouth 2 times daily      oxyCODONE HCl (OXYCONTIN PO) Take by mouth (Patient not taking: Reported on 10/11/2022)      Magic Mouthwash (MIRACLE MOUTHWASH) Swish and spit 5 mLs 4 times daily as needed for Irritation       Allergies:   Allergies   Allergen Reactions    Erythromycin Base      uncoated    Tetanus Toxoid      last injection was at age 13, got very high fever     Screenings:                 [facility] Assessment  BP: 121/74  Heart Rate: 94          REVIEW OF SYSTEMS  Positives and Pertinent Negatives as per HPI. PHYSICAL EXAM  /74   Pulse 94   Temp 98.2 °F (36.8 °C) (Oral)   Resp 16   Ht 5' 11\" (1.803 m)   Wt 135 lb (61.2 kg)   SpO2 99%   BMI 18.83 kg/m²     GENERAL APPEARANCE: Awake and alert. Cooperative. No acute distress. HEAD: Normocephalic. Atraumatic. EYES:  EOM's grossly intact. ENT: Mucous membranes are moist.   NECK: Supple. HEART: RRR. No murmurs, rubs, or gallops. LUNGS: CTA B. Respirations unlabored. Good air exchange. ABDOMEN: Soft. Non-distended. Non-tender. Patient with some bubbling at the G-tube site with drainage of darker fluid. There is surrounding erythema and some skin breakdown about site. No fluctuance or induration. No blistering or sloughing. No deep abdominal pain. No's hernias or masses. EXTREMITIES: No peripheral edema. Moves all extremities equally. All extremities neurovascularly intact. SKIN: Warm and dry. No acute rashes. NEUROLOGICAL: Alert and oriented. No gross facial drooping. Strength 5/5, sensation intact. EKG  When ordered, EKG's are interpreted by the ED Physician in the absence of a Cardiologist.  Please see their note for interpretation of EKG. LABS  Labs Reviewed   CBC WITH AUTO DIFFERENTIAL - Abnormal; Notable for the following components:       Result Value    RBC 3.68 (*)     Hemoglobin 11.9 (*)     Hematocrit 34.8 (*)     Neutrophils Absolute 8.9 (*)     Lymphocytes Absolute 0.6 (*)     All other components within normal limits   COMPREHENSIVE METABOLIC PANEL - Abnormal; Notable for the following components:    Chloride 95 (*)     Glucose 113 (*)     BUN 22 (*)     Creatinine 0.7 (*)     AST 12 (*)     All other components within normal limits   LACTIC ACID     When ordered, only abnormal lab results are displayed.   All other labs were within normal range or not returned as of this dictation. RADIOLOGY  Non-plain film images such as CT, U/S, and MRI are read by the radiologist.  Plain radiographic images are visualized and preliminarily interpreted by the ED Provider with the below findings:     A KUB obtained with Gastrografin injected into the G-tube with what appears to be appropriate location without leaking of barium. Interpretation per the Radiologist below, if available at the time of this note:  CT ABDOMEN PELVIS W IV CONTRAST Additional Contrast? None   Preliminary Result   Gastrostomy tube is seen within the stomach. New from the prior CT, there is   skin thickening/edema along with subcutaneous edema, along the gastrostomy   tube. This could represent cellulitis. No well-defined drainable fluid   collection is seen. No bowel obstruction is appreciated. XR ABDOMEN (KUB) (SINGLE AP VIEW)   Final Result   1. Intraluminal placement of percutaneous gastric tube. PROCEDURES  Unless otherwise noted below, none.     ED COURSE/DDx/MDM  History obtained from:  Patient    Vitals:  Vitals:    03/18/23 1023 03/18/23 1146 03/18/23 1300 03/18/23 1627   BP: 121/86 124/81  121/74   Pulse: (!) 115 (!) 103 97 94   Resp: 16 16 16 16   Temp: 98.2 °F (36.8 °C)      TempSrc: Oral      SpO2: 100% 100% 98% 99%   Weight: 135 lb (61.2 kg)      Height: 5' 11\" (1.803 m)        Patient received following medications in ED:  Medications   diatrizoate meglumine-sodium (GASTROGRAFIN) 66-10 % solution 20 mL (20 mLs Oral Given 3/18/23 1054)   0.9 % sodium chloride bolus (0 mLs IntraVENous Stopped 3/18/23 1231)   ketorolac (TORADOL) injection 30 mg (30 mg IntraVENous Given 3/18/23 1108)   iopamidol (ISOVUE-370) 76 % injection 75 mL (75 mLs IntraVENous Given 3/18/23 1443)   nystatin (MYCOSTATIN) cream ( Topical Given 3/18/23 1535)   sulfamethoxazole-trimethoprim (BACTRIM DS;SEPTRA DS) 800-160 MG per tablet 1 tablet (1 tablet Oral Given 3/18/23 1605) cephALEXin (KEFLEX) capsule 500 mg (500 mg Oral Given 3/18/23 2425)     Sepsis Consideration:  Is this patient to be included in the SEP-1 Core Measure due to severe sepsis or septic shock? No   Exclusion criteria - the patient is NOT to be included for SEP-1 Core Measure due to:  2+ SIRS criteria are not met    Records Reviewed:   Telephone encounter from 3/7/2023 with general surgeon reviewed at which point patient accidentally pulled out his G-tube. He was seen and evaluated in our department with replacement. Chronic conditions affecting care:   Cancer and G-tube. has a past medical history of CAD (coronary artery disease), Cancer ([address] Hypertension, and Tinnitus. Social Determinants:   Patient does not drive although does have transportation to and from doctors appointments. Consults:   None    Reassessment:      Patient ordered morphine and Zofran IV for pain and nausea although he declined. Was given some Toradol with improvement of symptoms. What appears to give him the most relief is nystatin cream on area of erythema and skin breakdown to abdomen. Vitals have remained stable and he remains without significant complaints. MDM/Disposition Considerations:   Briefly Celia Malik presents for drainage from G-tube site on abdomen with associated skin breakdown and irritation. Infectious work-up obtained without leukocytosis noted on CBC. H&H were within normal limits. CMP unremarkable. Lactate 1.1. Patient with unremarkable KUB with Gastrografin injected through G-tube without complications identified. Given ongoing drainage from site did obtain a CT of the abdomen pelvis for more clear etiology. Findings at this time appear consistent with edema and concerns for abdominal wall cellulitis. Patient was offered admission here to the hospital although declined adamantly.   He will be initiated on Bactrim and Keflex with recommendations for close and continued outpatient general surgery follow-up. We have discussed return precautions otherwise and patient in agreement and comfortable at discharge. .    Critical Care Time:   10 Minutes of critical care time spent not including separately billable procedures. DDx:  I estimate there is LOW risk for ACUTE APPENDICITIS, BOWEL OBSTRUCTION, CHOLECYSTITIS, DIVERTICULITIS, INCARCERATED HERNIA, PANCREATITIS, or PERFORATED BOWEL or ULCER, thus I consider the discharge disposition reasonable. Also, there is no evidence or peritonitis, sepsis, or toxicity. Brooke Trujillo and I have also discussed returning to the Emergency Department immediately if new or worsening symptoms occur. We have discussed the symptoms which are most concerning (e.g., bloody stool, fever, changing or worsening pain, vomiting) that necessitate immediate return. FINAL IMPRESSION  1. Cellulitis of abdominal wall    2. Complication of gastrostomy tube [facility])      Patient referred to: [facility] [address] [place] [address]    Schedule an appointment as soon as possible for a visit       [facility]  ED  [facility] [address]  [phone]    If symptoms worsen    Discharge Medications:   Discharge Medication List as of 3/18/2023  4:09 PM        START taking these medications    Details   sulfamethoxazole-trimethoprim (BACTRIM DS) 800-160 MG per tablet Take 2 tablets by mouth 2 times daily for 10 days, Disp-40 tablet, R-0Normal      cephALEXin (KEFLEX) 500 MG capsule Take 1 capsule by mouth 4 times daily for 10 days, Disp-40 capsule, R-0Normal      nystatin (MYCOSTATIN) 585626 UNIT/GM ointment Apply topically 2 times daily. , Disp-2 each, R-3, Normal           Discontinued Medications:  Discharge Medication List as of 3/18/2023  4:09 PM        Risk management discussed and shared decision making had with patient and/or surrogate. All questions were answered.  Patient will follow up with PCP and/or general surgeon within 2 to 3 days for further evaluation/treatment. All questions answered. Patient will return to ED for new/worsening symptoms. DISPOSITION:  Patient was discharged home in stable condition. (Please note that portions of this note were completed with a voice recognition program.  Efforts were made to edit the dictations but occasionally words are mis-transcribed).           Lucila Hopewell, [place]  03/19/23 8716

## 2023-03-18 NOTE — ED NOTES
Pt stated he does not want morphine due to nausea with it, [facility] [place] updated.      Nancy Monday, RN  03/18/23 8535

## 2023-03-18 NOTE — ED NOTES
Pt continues to rest in bed with eyes closed, pt wife updated that Bill Isabel is ordering CT of abd. Pt wife stated no needs at this time.       Pratibha Bateman RN  03/18/23 5095

## 2023-03-18 NOTE — ED NOTES
Pt provided with pillows states no other needs at this time.      Lamberto Pollack RN  03/18/23 0422

## 2023-03-18 NOTE — ED NOTES
Pt resting in bed with eyes closed. Pt wife stated no needs at this time.      Lo Emerson RN  03/18/23 8714

## 2023-03-18 NOTE — ED NOTES
Pt wife came to nurses station stated she ran out of paper towels, pt and wife provided with 4X4s, tissues, paper towels and chux pads. Pt requested ice pack, pt provided with ice pack. Pt very upset and stating \"I have been here for hours and you guys have none nothing for my pain or the drainage\" pt reminded we gave him pain medication and he slept for a couple hours after pt stated \"well why arent you putting something on this to make it stop draining\" pt informed until staff knows what is wrong with the tube or what the drainage is, we do not want to stop the drainage. Pt wife stated Arta Schilder is right, she came in multiple times while you were asleep. She has been great to us\" pt requesting nystatin cream to help with the burning, TAMIKO BOOTHE notified.       Lamberto Pollack RN  03/18/23 7034

## 2023-03-18 NOTE — ED NOTES
Pt and wife provided with warm blankets, pt and wife state no other needs at this time.      Robinson Brown RN  03/18/23 8055

## 2023-03-18 NOTE — ED NOTES
Pt resting in bed with eyes closed, pt wife states no needs at this time.       Karlene Iglesias, VINCE  03/18/23 3277

## 2023-03-20 ENCOUNTER — OFFICE VISIT (OUTPATIENT)
Dept: SURGERY | Age: 58
End: 2023-03-20
Payer: COMMERCIAL

## 2023-03-20 VITALS
HEART RATE: 109 BPM | SYSTOLIC BLOOD PRESSURE: 119 MMHG | HEIGHT: 71 IN | DIASTOLIC BLOOD PRESSURE: 81 MMHG | WEIGHT: 132 LBS | BODY MASS INDEX: 18.48 KG/M2

## 2023-03-20 DIAGNOSIS — R13.19 OTHER DYSPHAGIA: Primary | ICD-10-CM

## 2023-03-20 PROCEDURE — 99212 OFFICE O/P EST SF 10 MIN: CPT | Performed by: SURGERY

## 2023-03-20 RX ORDER — NYSTATIN 100000 U/G
OINTMENT TOPICAL
Qty: 30 G | Refills: 4 | Status: SHIPPED | OUTPATIENT
Start: 2023-03-20

## 2023-03-20 NOTE — PROGRESS NOTES
HPI: Nursing notes reviewed. Patient reports drainage around gtube since it was replaced in ER. Anytime he coughs or moves the stopper around the tube comes loose and gastric contents leak out. Causing pain and redness of skin. Less drainage since putting clothes pin at base of tube to keep stopper in place. ROS:  10 point review of systems performed with pertinent positives in HPI    Phys:    Abd - soft, tender around gtube where erythema is, stopper loose       Assesment: 60 yo with leaking around gtube    Plan: 1. Stopper on tube loose and won't stay in place, effectively eliminating the vice like affect and causing the leakage. 2.  Tube exchanged for previous model uzma 20F gtube as stopper not loose. 3.  Continue nystatin powder and follow up next week.

## 2023-03-21 ENCOUNTER — TELEPHONE (OUTPATIENT)
Dept: SURGERY | Age: 58
End: 2023-03-21

## 2023-03-21 NOTE — TELEPHONE ENCOUNTER
Pt's wife 'Patria Mendiola' called again and said that food and antibiotics and all is coming back out too fast. With the level of leakage the Nystatin isn't really helping and he's starving. She's upset and not knowing what to do. She said if Dr. Emma Bello is wanting him to go to the ED that they do not help him, that they are not good with this type of injury. What should they do? Please call her and thank you!

## 2023-03-21 NOTE — TELEPHONE ENCOUNTER
Pt's wife 'Sandie Pardo' called and asked if Dr. Kenard Krabbe can call in RX refills for the Nystatin cream? He only has 1 application left (for the acid leaking around the G-tube)and [address] wouldn't give her another refill. She asked if he could call in at least 2 more tubes. It's [address] [place] [address] # [phone]. Please call her and let her know and thank you!  # [phone]

## 2023-03-23 ENCOUNTER — TELEPHONE (OUTPATIENT)
Dept: SURGERY | Age: 58
End: 2023-03-23

## 2023-03-23 NOTE — TELEPHONE ENCOUNTER
Pt's wife 'Sandie Pardo' called again and said that she just found out that they can get the antibiotics the ED prescribed him in liquid form instead of powder form. She wants to know if Dr. Kenard Krabbe will please call in RX of the liquid form to [facility]? It's Sulsamethoxazole-TMP -160. The tube is still clogged tho and they need to know how to resolve this issue. Please call her and thank you!

## 2023-03-23 NOTE — TELEPHONE ENCOUNTER
Pt's wife 'Kimi Samson' called and said that his feed tube is blocked. He had gone to ED the other day and they had put stoma paste on it so the leaking couldn't get to the burn and make it worse. They gave them tips on the wound care and also different positions of better ways to feed. But she said the pulverized pills are clogging it and she can't even inject water into it. What should they do? Please call her and thank you!

## 2023-03-24 ENCOUNTER — HOSPITAL ENCOUNTER (EMERGENCY)
Age: 58
Discharge: HOME | End: 2023-03-24
Payer: COMMERCIAL

## 2023-03-24 VITALS
RESPIRATION RATE: 18 BRPM | DIASTOLIC BLOOD PRESSURE: 80 MMHG | OXYGEN SATURATION: 95 % | TEMPERATURE: 97.2 F | SYSTOLIC BLOOD PRESSURE: 119 MMHG | HEART RATE: 100 BPM

## 2023-03-24 DIAGNOSIS — K94.23 GASTROSTOMY TUBE DYSFUNCTION (HCC): Primary | ICD-10-CM

## 2023-03-24 PROCEDURE — 6370000000 HC RX 637 (ALT 250 FOR IP): Performed by: NURSE PRACTITIONER

## 2023-03-24 PROCEDURE — 99283 EMERGENCY DEPT VISIT LOW MDM: CPT

## 2023-03-24 RX ORDER — HYDROCODONE BITARTRATE AND ACETAMINOPHEN 5; 325 MG/1; MG/1
1 TABLET ORAL ONCE
Status: COMPLETED | OUTPATIENT
Start: 2023-03-24 | End: 2023-03-24

## 2023-03-24 RX ADMIN — HYDROCODONE BITARTRATE AND ACETAMINOPHEN 1 TABLET: 5; 325 TABLET ORAL at 11:20

## 2023-03-24 ASSESSMENT — PAIN - FUNCTIONAL ASSESSMENT: PAIN_FUNCTIONAL_ASSESSMENT: 0-10

## 2023-03-24 ASSESSMENT — PAIN SCALES - GENERAL: PAINLEVEL_OUTOF10: 10

## 2023-03-24 NOTE — DISCHARGE INSTRUCTIONS
Please schedule outpatient appointment with the wound care center at [facility].

## 2023-03-24 NOTE — ED PROVIDER NOTES
[address] [place] [address]  ED  [facility] [id number]  Dept: [phone]  Dept Fax: [phone]  Loc: [facility]        ***This patient was not seen or evaluated by the attending physician.    ***I evaluated this patient, the attending physician was available for consultation. CHIEF COMPLAINT    Chief Complaint   Patient presents with    G Tube Complications     Pt requesting g tube removal d/t multiple faulty tubes. Pt has dark leakage noted to site and has significant pain. GUILLERMINA Gonzalez is a 59 y.o. male who presents to the emergency department with his spouse with a request to permanently remove his G-tube. He states that he is tired of dealing with the G-tube and he is frustrated. Its been changed out several times and it continues to \"pour acid\" constantly. It is burning his skin. He wants it out. He does not want it replaced. The family member states that she can give him oral nutrition through shakes and puréeing his food. He denies nausea or vomiting. They want to know how long it is going to take to heal once the G-tube is out. They also want to know what they are going to do with the acid contents as the insertion site is healing. They want to speak with Dr. Rachelle Brown. He states he has been placing ointments and paste around the G-tube site to help it heal and to help prevent the gastric contents from going out into his skin but that these things barriers or not staying in place due to the amount of gastric contents. Chart review reveals that he was seen on March 22, 2023 at [facility] discharge instructions:  Ivonne Loredo, you were seen in the emergency department for leakage around your gastrostomy tube. I spoke to one of our general surgeons, Dr. Alfred Mcdermott. He recommended prescribing Calmoseptine ointment. I have also prescribed stoma paste. Please use these as directed.  He stated that Please see her note for details of what all she did. Chronic conditions affecting care:  has a past medical history of CAD (coronary artery disease), Cancer ([address] Hypertension, and Tinnitus. Social Determinants of Health: none    Goals of Care Discussed: discussed. Offered to replaced the g-tube. Patient and wife do not a gtube replaced for now. The patient was instructed to follow up as an outpatient in 2 days. The patient was instructed to return to the ED immediately for any new or worsening symptoms. The patient verbalized understanding. FINAL IMPRESSION    1.  Gastrostomy tube dysfunction [facility])        PLAN  Discharge with close outpatient follow-up (see EMR)     (Please note that this note was completed with a voice recognition program.  Every attempt was made to edit the dictations, but inevitably there remain words that are mis-transcribed.)            RAMON Weinberg - CNP  03/29/23 RAMON James - AJIT  03/29/23 3059

## 2023-03-24 NOTE — TELEPHONE ENCOUNTER
Patient instructed to use [employer] to unclog tube. Patient called back to report it was successful. Message sent to Dr. Clif Wayne requesting a liquid form of antibiotic.

## 2023-03-24 NOTE — CONSULTS
03/24/23 1214   Gastrostomy/Enterostomy/Jejunostomy Tube Gastrostomy LUQ   Placement Date/Time: 03/24/23 1214   Type: Gastrostomy  Location: LUQ   Drainage Appearance Green;Brown   Site Description Leaking at site; Reddened   J Port Status Open to gravity drainage   Surrounding Skin Macerated   Dressing Status New dressing applied   Dressing Type Hydrocolloid; Other (Comment)  (coloplast ostomy green [id number] & flange [id number])           Wound Care saw patient in ED for P-tube open site draining continuously green/brown gastric fluid. Surrounding skin irritated. , pale and red. Unable to keep regular dressing on site. Patient through with cancer treatments. Peg tube now out. Apply new appliance every 3-5 days    Have flange pre cut and ready. 3/4\"   Used adhesive remover to clean old sticky dressing from site. Cleansed site with normal saline. Pat dry. Applied barrier powder then dapped with barrier film to form a crust over the irritated skin. Placed Hydrocolloid skin protector sheets around opening. Press to lower side of opening to form seal.  Place barrier ring to lower half of opening, press to seal below opening of wound. Place Janeece Farshad # [id number] over opening, again press lower edge to form seal.  Place Bag # [id number] over flange, apply heat or hold hand over appliance to allow heat to help seal of products. [address] [place], [place] [phone] at [address] [place] [address] ,  [address] [place] [address] Rd  May be able to see to assist with applying ostomy bag. If need can call Gail Bell Monday through Friday [phone]  Direction explained to both patient and spouse.

## 2023-03-27 ENCOUNTER — HOSPITAL ENCOUNTER (OUTPATIENT)
Dept: WOUND CARE | Age: 58
Discharge: HOME | End: 2023-03-27
Payer: COMMERCIAL

## 2023-03-27 PROCEDURE — 99211 OFF/OP EST MAY X REQ PHY/QHP: CPT

## 2023-03-27 NOTE — CONSULTS
Pt seen today as an outpatient for skin breakdown around peg tube site. Pt had peg tube in place that was removed on Friday. Pt has decided to leave peg tube out due to leaking when it was in place. Pt has been to ER multiple times including on Friday and was seen at [facility] by wound RN. 2 piece pouch placed but leaked when they got home. Wife has been changing appliance frequently at home due to leaks. Pt arrived with towel on abdomen, no appliance in place. Pt is having a large amount of yellow gastric output from opening. Periwound skin is red, very moist and denuded. Proximal periwound skin is very firm, distal skin is soft. Opening lies in very deep crease at 3 and 9 o'clock. Opening disappears in crease when pt sits up. Peristomal skin sprinkled with stoma powder and AF powder. Excess dusted off. Barrier wipe applied over powder. Barrier seals placed in creases at 3 and 9 colock and small amount under opening distally. Paste applied around opening on wafer which is cut with radial slits. Pt repouched using one piece Xpro appliance, barrier extenders applied, good seal obtained, effluent noted going into pouch. Heat pack applied. Pt and wife provided with samples of products used. Encouraged to call with update or for questions or concerns.   Time spent 50 minutes

## 2023-03-28 ENCOUNTER — TELEPHONE (OUTPATIENT)
Dept: SURGERY | Age: 58
End: 2023-03-28

## 2023-03-28 NOTE — TELEPHONE ENCOUNTER
Juliette Soria at [facility] called saying pt is currently in their office - wife says [facility] where a colostomy bag was put around pts feeding tube due to leaking but wife says area is sore and won't stop leaking - Informed no Dr in the office this afternoon but Dr Mela Caldera can see pt tomorrow (pt already had f/u appt scheduled) or pt can go to ED if can't wait until his appt tomorrow - Wife said they will wait to see Dr Mela Caldera tomorrow

## 2023-09-25 ENCOUNTER — TELEPHONE (OUTPATIENT)
Dept: CARDIOLOGY CLINIC | Age: 58
End: 2023-09-25

## 2023-11-01 NOTE — PROGRESS NOTES
[facility]   Electrophysiology Outpatient Note              Date:  November 1, 2023  Patient name: Lenka Lobato  YOB: 1965    Primary Care physician: [facility], [facility]    HISTORY OF PRESENT ILLNESS: The patient is a 59 y.o.  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 He was discharged with cardiac event monitor and continues to have episodes of heart block. He was re admitted with 7 sec pause on 6/7/2022 and had a PPM implanted on 6/8/2022. Unfortunately had RA lead revision on 6/13/2023    Today he is being seen for PPM follow up for CHB. EKG shows SR with a HR of 89. His device check revealed 4 fast A and V episodes between March 7, 2023 and November 3, 2023 . likely sinus tachycardia or SVT . Last episode occurred August 2023. AP <.01 % and  1.8%. Patient denies having any cardiac complaints. He and his wife discussed the medical experience over the last year with his oncologic and vascular issues. He has a right IC artery stent that protrudes into the nasopharynx terminating the left posterior nasal cavity. Past Medical History:   has a past medical history of CAD (coronary artery disease), Cancer ([address]), Hypertension, and Tinnitus. Past Surgical History:   has a past surgical history that includes Stomach surgery (N/A, 7/29/2022). Home Medications:    Prior to Admission medications    Medication Sig Start Date End Date Taking? Authorizing Provider   nystatin (MYCOSTATIN) 489725 UNIT/GM ointment Apply topically 2 times daily.  3/20/23   Shanna Valdovinos MD   acetaminophen (TYLENOL) 80 MG/0.8ML suspension 15 mg/kg by Per G Tube route every 4 hours as needed for Fever    ProviderPriyank MD   pseudoephedrine (SUDAFED) 30 MG/5ML syrup Take 15 mg by mouth 4 times daily as needed    Provider,

## 2023-11-03 ENCOUNTER — OFFICE VISIT (OUTPATIENT)
Dept: CARDIOLOGY CLINIC | Age: 58
End: 2023-11-03

## 2023-11-03 ENCOUNTER — NURSE ONLY (OUTPATIENT)
Dept: CARDIOLOGY CLINIC | Age: 58
End: 2023-11-03

## 2023-11-03 VITALS
OXYGEN SATURATION: 96 % | HEART RATE: 89 BPM | BODY MASS INDEX: 18.03 KG/M2 | DIASTOLIC BLOOD PRESSURE: 68 MMHG | HEIGHT: 71 IN | SYSTOLIC BLOOD PRESSURE: 92 MMHG | WEIGHT: 128.8 LBS

## 2023-11-03 DIAGNOSIS — R00.1 SYMPTOMATIC BRADYCARDIA: Primary | ICD-10-CM

## 2023-11-03 DIAGNOSIS — Z95.0 PACEMAKER: Primary | ICD-10-CM

## 2023-11-03 DIAGNOSIS — R00.1 SYMPTOMATIC BRADYCARDIA: ICD-10-CM

## 2024-07-19 ENCOUNTER — TELEPHONE (OUTPATIENT)
Dept: CARDIOLOGY CLINIC | Age: 59
End: 2024-07-19

## 2024-07-19 NOTE — TELEPHONE ENCOUNTER
I called pt today to confirm 7/23/24 transmission & to inform to reset his brian on his phone to send a manual transmission. The pt stated he doesn't use an brian and doesn't have a bedside transmitter. He stated he doesn't want one if it costs money. I informed pt due to his device implant that he has to have something to transmit information to us or will have to come to office for routine in office checks. The pt became upset. I informed the pt that I will send this message to device staff to look into a beside monitor for pt. If it costs the pt money to get the monitor or send transmissions, not sure if this will work for the pt. I think it may be best to speak to have the device staff to speak with pt's wife about the importance of device checks and if a bedside monitor be ordered.

## 2024-07-23 ENCOUNTER — PATIENT MESSAGE (OUTPATIENT)
Dept: CARDIOLOGY CLINIC | Age: 59
End: 2024-07-23

## 2024-07-23 NOTE — TELEPHONE ENCOUNTER
Mr Lee,    My apologies for the misunderstanding with your at home pacer checks.  I had a note in your chart from 2/2023 from Dr Baker to not place charges for the at home checks.  I  f you ever need or want to send a transmission from your home monitor there will not be charges submitted.   We will check your pacemaker at your next office visit 11/2024.

## 2024-07-23 NOTE — TELEPHONE ENCOUNTER
His next appt with EP is 11/4/24 with a device check in office. Does the pt need to come in sooner for a device check or can we just check him in November?

## 2024-11-08 NOTE — TELEPHONE ENCOUNTER
[employer] reported the shortest pause was 4.6 sec and longest was 5.3 sec.  Report is on website PAST MEDICAL HISTORY:  No pertinent past medical history

## (undated) DEVICE — BLADELESS OBTURATOR: Brand: WECK VISTA

## (undated) DEVICE — TIP COVER ACCESSORY

## (undated) DEVICE — COLUMN DRAPE

## (undated) DEVICE — TUBE FEED GASTROSTOMY MIC 22FR

## (undated) DEVICE — GLOVE,SURG,SENSICARE SLT,LF,PF,7: Brand: MEDLINE

## (undated) DEVICE — TROCAR: Brand: KII FIOS FIRST ENTRY

## (undated) DEVICE — SUTURE PERMAHAND SZ 3-0 L30IN NONABSORBABLE BLK SH L26MM K832H

## (undated) DEVICE — CANNULA SEAL

## (undated) DEVICE — TUBE FEED GASTROSTOMY MIC 20FR

## (undated) DEVICE — Device

## (undated) DEVICE — ARM DRAPE

## (undated) DEVICE — SUTURE VCRL + SZ 0 L27IN ABSRB VLT L26MM UR-6 5/8 CIR VCP603H

## (undated) DEVICE — SUTURE PERMA-HAND SZ 2-0 L30IN NONABSORBABLE BLK L26MM SH K833H

## (undated) DEVICE — SUTURE VCRL SZ 3-0 L18IN ABSRB UD L26MM SH 1/2 CIR J864D

## (undated) DEVICE — REDUCER: Brand: ENDOWRIST